# Patient Record
Sex: MALE | Race: WHITE | NOT HISPANIC OR LATINO | Employment: OTHER | URBAN - METROPOLITAN AREA
[De-identification: names, ages, dates, MRNs, and addresses within clinical notes are randomized per-mention and may not be internally consistent; named-entity substitution may affect disease eponyms.]

---

## 2017-02-24 ENCOUNTER — OFFICE VISIT (OUTPATIENT)
Dept: INTERNAL MEDICINE | Facility: CLINIC | Age: 67
End: 2017-02-24
Payer: MEDICARE

## 2017-02-24 VITALS
SYSTOLIC BLOOD PRESSURE: 172 MMHG | TEMPERATURE: 98 F | RESPIRATION RATE: 16 BRPM | HEART RATE: 73 BPM | DIASTOLIC BLOOD PRESSURE: 78 MMHG | WEIGHT: 165.81 LBS

## 2017-02-24 DIAGNOSIS — N41.1 CHRONIC PROSTATITIS: Primary | ICD-10-CM

## 2017-02-24 DIAGNOSIS — R30.0 SPASTIC DYSURIA: ICD-10-CM

## 2017-02-24 PROCEDURE — 99204 OFFICE O/P NEW MOD 45 MIN: CPT | Mod: S$PBB,,, | Performed by: FAMILY MEDICINE

## 2017-02-24 PROCEDURE — 99203 OFFICE O/P NEW LOW 30 MIN: CPT | Mod: PBBFAC,PO | Performed by: FAMILY MEDICINE

## 2017-02-24 PROCEDURE — 99999 PR PBB SHADOW E&M-NEW PATIENT-LVL III: CPT | Mod: PBBFAC,,, | Performed by: FAMILY MEDICINE

## 2017-02-24 PROCEDURE — 87086 URINE CULTURE/COLONY COUNT: CPT

## 2017-02-24 PROCEDURE — 96372 THER/PROPH/DIAG INJ SC/IM: CPT | Mod: PBBFAC,PO

## 2017-02-24 PROCEDURE — 81001 URINALYSIS AUTO W/SCOPE: CPT | Mod: PBBFAC,PO | Performed by: FAMILY MEDICINE

## 2017-02-24 RX ORDER — TRIMETHOPRIM 100 MG/1
TABLET ORAL
Refills: 0 | COMMUNITY
Start: 2017-02-15 | End: 2017-02-24

## 2017-02-24 RX ORDER — CEFTRIAXONE 1 G/1
1 INJECTION, POWDER, FOR SOLUTION INTRAMUSCULAR; INTRAVENOUS
Status: COMPLETED | OUTPATIENT
Start: 2017-02-24 | End: 2017-02-24

## 2017-02-24 RX ORDER — VALSARTAN 80 MG/1
80 TABLET ORAL DAILY
COMMUNITY
End: 2019-09-05 | Stop reason: DRUGHIGH

## 2017-02-24 RX ORDER — TRAMADOL HYDROCHLORIDE 50 MG/1
50 TABLET ORAL EVERY 6 HOURS PRN
Qty: 30 TABLET | Refills: 0 | Status: SHIPPED | OUTPATIENT
Start: 2017-02-24 | End: 2017-03-06

## 2017-02-24 RX ORDER — CIPROFLOXACIN 500 MG/1
500 TABLET ORAL 2 TIMES DAILY
Qty: 20 TABLET | Refills: 0 | Status: SHIPPED | OUTPATIENT
Start: 2017-02-24 | End: 2017-03-06

## 2017-02-24 RX ADMIN — CEFTRIAXONE 1 G: 1 INJECTION, POWDER, FOR SOLUTION INTRAMUSCULAR; INTRAVENOUS at 04:02

## 2017-02-26 LAB — BACTERIA UR CULT: NO GROWTH

## 2017-02-27 LAB
BILIRUB SERPL-MCNC: NEGATIVE MG/DL
BLOOD URINE, POC: NEGATIVE
COLOR, POC UA: YELLOW
GLUCOSE UR QL STRIP: NEGATIVE
KETONES UR QL STRIP: ABNORMAL
LEUKOCYTE ESTERASE URINE, POC: NEGATIVE
NITRITE, POC UA: NEGATIVE
PH, POC UA: 5
PROTEIN, POC: NEGATIVE
SPECIFIC GRAVITY, POC UA: 1.02
UROBILINOGEN, POC UA: NORMAL

## 2019-08-22 ENCOUNTER — TELEPHONE (OUTPATIENT)
Dept: ADMINISTRATIVE | Facility: HOSPITAL | Age: 69
End: 2019-08-22

## 2019-08-22 ENCOUNTER — PATIENT OUTREACH (OUTPATIENT)
Dept: ADMINISTRATIVE | Facility: HOSPITAL | Age: 69
End: 2019-08-22

## 2019-09-05 ENCOUNTER — OFFICE VISIT (OUTPATIENT)
Dept: FAMILY MEDICINE | Facility: CLINIC | Age: 69
End: 2019-09-05
Payer: COMMERCIAL

## 2019-09-05 VITALS
BODY MASS INDEX: 24.61 KG/M2 | WEIGHT: 166.13 LBS | OXYGEN SATURATION: 98 % | SYSTOLIC BLOOD PRESSURE: 130 MMHG | HEART RATE: 62 BPM | HEIGHT: 69 IN | DIASTOLIC BLOOD PRESSURE: 64 MMHG | TEMPERATURE: 98 F | RESPIRATION RATE: 18 BRPM

## 2019-09-05 DIAGNOSIS — I10 BENIGN ESSENTIAL HYPERTENSION: Primary | ICD-10-CM

## 2019-09-05 DIAGNOSIS — F51.01 PRIMARY INSOMNIA: ICD-10-CM

## 2019-09-05 DIAGNOSIS — K21.9 GASTROESOPHAGEAL REFLUX DISEASE WITHOUT ESOPHAGITIS: ICD-10-CM

## 2019-09-05 PROCEDURE — 99213 OFFICE O/P EST LOW 20 MIN: CPT | Mod: S$GLB,,, | Performed by: INTERNAL MEDICINE

## 2019-09-05 PROCEDURE — 99999 PR PBB SHADOW E&M-EST. PATIENT-LVL III: ICD-10-PCS | Mod: PBBFAC,,, | Performed by: INTERNAL MEDICINE

## 2019-09-05 PROCEDURE — 1101F PT FALLS ASSESS-DOCD LE1/YR: CPT | Mod: CPTII,S$GLB,, | Performed by: INTERNAL MEDICINE

## 2019-09-05 PROCEDURE — 1101F PR PT FALLS ASSESS DOC 0-1 FALLS W/OUT INJ PAST YR: ICD-10-PCS | Mod: CPTII,S$GLB,, | Performed by: INTERNAL MEDICINE

## 2019-09-05 PROCEDURE — 99999 PR PBB SHADOW E&M-EST. PATIENT-LVL III: CPT | Mod: PBBFAC,,, | Performed by: INTERNAL MEDICINE

## 2019-09-05 PROCEDURE — 99213 PR OFFICE/OUTPT VISIT, EST, LEVL III, 20-29 MIN: ICD-10-PCS | Mod: S$GLB,,, | Performed by: INTERNAL MEDICINE

## 2019-09-05 RX ORDER — SILDENAFIL 100 MG/1
100 TABLET, FILM COATED ORAL DAILY PRN
COMMUNITY
End: 2020-02-27 | Stop reason: SDUPTHER

## 2019-09-05 RX ORDER — VALSARTAN 160 MG/1
160 TABLET ORAL DAILY
Qty: 90 TABLET | Refills: 3 | Status: SHIPPED | OUTPATIENT
Start: 2019-09-05 | End: 2020-10-15 | Stop reason: SDUPTHER

## 2019-09-05 RX ORDER — ZOLPIDEM TARTRATE 5 MG/1
5 TABLET ORAL NIGHTLY PRN
COMMUNITY
End: 2020-09-10 | Stop reason: SDUPTHER

## 2019-09-05 RX ORDER — VALSARTAN 160 MG/1
160 TABLET ORAL DAILY
COMMUNITY
End: 2019-09-05 | Stop reason: ALTCHOICE

## 2019-09-05 RX ORDER — VALSARTAN 160 MG/1
160 TABLET ORAL DAILY
COMMUNITY
End: 2019-09-05 | Stop reason: SDUPTHER

## 2019-09-05 RX ORDER — PANTOPRAZOLE SODIUM 40 MG/1
40 TABLET, DELAYED RELEASE ORAL DAILY
COMMUNITY
End: 2020-05-19 | Stop reason: SDUPTHER

## 2019-09-05 NOTE — PROGRESS NOTES
[facility] Note    Chief Complaint      Chief Complaint   Patient presents with    Follow-up     6 month follow up        History of Present Illness      Gerald Wilkinson is a 69 y.o. male who presents today for   Chief Complaint   Patient presents with    Follow-up     6 month follow up    .  Patient comes to appointment for 6 m checkup for chronicissues  as below . He is uptopdate with all labs all scanned in 1/19 all look good .   bp well controlled as well . He is active with golfing and walking for exercise .  HPI    No problem-specific Assessment & Plan notes found for this encounter.       Problem List Items Addressed This Visit        Cardiac/Vascular    Benign essential hypertension - Primary    Overview     bp well controled on current regimen cont same            GI    Gastroesophageal reflux disease without esophagitis    Overview     Cont current regimen prn protonix             Other    Primary insomnia    Overview     ambien prn continues to work well                 Past Medical History:  History reviewed. No pertinent past medical history.    Past Surgical History:  History reviewed. No pertinent surgical history.    Family History:  family history includes Cancer in his mother and sister; Hypertension in his mother and sister.     Social History:  Social History     Socioeconomic History    Marital status:      Spouse name: Not on file    Number of children: Not on file    Years of education: Not on file    Highest education level: Not on file   Occupational History    Not on file   Social Needs    Financial resource strain: Not on file    Food insecurity:     Worry: Not on file     Inability: Not on file    Transportation needs:     Medical: Not on file     Non-medical: Not on file   Tobacco Use    Smoking status: Never Smoker   Substance and Sexual Activity    Alcohol use: No    Drug use: Yes    Sexual activity: Yes     Partners: Female   Lifestyle     Physical activity:     Days per week: Not on file     Minutes per session: Not on file    Stress: Not on file   Relationships    Social connections:     Talks on phone: Not on file     Gets together: Not on file     Attends Mandaeism service: Not on file     Active member of club or organization: Not on file     Attends meetings of clubs or organizations: Not on file     Relationship status: Not on file   Other Topics Concern    Not on file   Social History Narrative    Not on file       Review of Systems:   Review of Systems   Constitutional: Negative for fever and weight loss.   HENT: Negative for congestion, hearing loss and sore throat.    Eyes: Negative for blurred vision.   Respiratory: Negative for cough and shortness of breath.    Cardiovascular: Negative for chest pain, claudication and leg swelling.   Gastrointestinal: Negative for abdominal pain, constipation and diarrhea.   Genitourinary: Negative for dysuria.   Musculoskeletal: Negative for back pain and myalgias.   Skin: Negative for rash.   Neurological: Negative for focal weakness and headaches.   Psychiatric/Behavioral: Negative for depression and memory loss. The patient is not nervous/anxious.         Medications:  Outpatient Encounter Medications as of 9/5/2019   Medication Sig Dispense Refill    DIOVAN 160 mg tablet Take 1 tablet (160 mg total) by mouth once daily. 90 tablet 3    multivitamin capsule Take 1 capsule by mouth once daily.      pantoprazole (PROTONIX) 40 MG tablet Take 40 mg by mouth once daily.      sildenafil (VIAGRA) 100 MG tablet Take 100 mg by mouth daily as needed for Erectile Dysfunction.      zolpidem (AMBIEN) 5 MG Tab Take 5 mg by mouth nightly as needed.      [DISCONTINUED] DIOVAN 160 mg tablet Take 160 mg by mouth once daily.      [DISCONTINUED] valsartan (DIOVAN) 160 MG tablet Take 160 mg by mouth once daily.      [DISCONTINUED] valsartan (DIOVAN) 80 MG tablet Take 80 mg by mouth once daily.       No  "facility-administered encounter medications on file as of 9/5/2019.        Allergies:  Review of patient's allergies indicates:  No Known Allergies      Physical Exam      Vitals:    09/05/19 1021   BP: 130/64   Pulse: 62   Resp: 18   Temp: 98 °F (36.7 °C)        Vital Signs  Temp: 98 °F (36.7 °C)  Pulse: 62  Resp: 18  SpO2: 98 %  BP: 130/64  BP Location: Right arm  Patient Position: Sitting  Pain Score: 0-No pain  Height and Weight  Height: 5' 9" (175.3 cm)  Weight: 75.4 kg (166 lb 1.9 oz)  BSA (Calculated - sq m): 1.92 sq meters  BMI (Calculated): 24.6  Weight in (lb) to have BMI = 25: 168.9]     Body mass index is 24.53 kg/m².    Physical Exam   Constitutional: He is oriented to person, place, and time. He appears well-developed and well-nourished.   HENT:   Head: Normocephalic.   Eyes: Pupils are equal, round, and reactive to light.   Neck: Normal range of motion. No thyromegaly present.   Cardiovascular: Normal rate and regular rhythm. Exam reveals no gallop and no friction rub.   No murmur heard.  Pulmonary/Chest: Effort normal and breath sounds normal.   Abdominal: Soft. Bowel sounds are normal.   Musculoskeletal: Normal range of motion. He exhibits no edema.   Neurological: He is alert and oriented to person, place, and time. No sensory deficit.   Skin: Skin is warm and dry.   Psychiatric: He has a normal mood and affect. His behavior is normal.        Laboratory:  CBC:  No results for input(s): WBC, RBC, HGB, HCT, PLT, MCV, MCH, MCHC in the last 2160 hours.  CMP:  No results for input(s): GLU, CALCIUM, ALBUMIN, PROT, NA, K, CO2, CL, BUN, ALKPHOS, ALT, AST, BILITOT in the last 2160 hours.    Invalid input(s): CREATININ  URINALYSIS:  No results for input(s): COLORU, CLARITYU, SPECGRAV, PHUR, PROTEINUA, GLUCOSEU, BILIRUBINCON, BLOODU, WBCU, RBCU, BACTERIA, MUCUS, NITRITE, LEUKOCYTESUR, UROBILINOGEN, HYALINECASTS in the last 2160 hours.   LIPIDS:  No results for input(s): TSH, HDL, CHOL, TRIG, LDLCALC, CHOLHDL, " NONHDLCHOL, TOTALCHOLEST in the last 2160 hours.  TSH:  No results for input(s): TSH in the last 2160 hours.  A1C:  No results for input(s): HGBA1C in the last 2160 hours.    Radiology:        Assessment:     Gerald Wilkinson is a 69 y.o.male with:    Benign essential hypertension    Gastroesophageal reflux disease without esophagitis    Primary insomnia    Other orders  -     DIOVAN 160 mg tablet; Take 1 tablet (160 mg total) by mouth once daily.  Dispense: 90 tablet; Refill: 3                Plan:     As above, continue current medications and maintain follow up with specialists.  Return to clinic in 8 months.    Frederick W Dantagnan [facility] [place]

## 2020-01-14 ENCOUNTER — TELEPHONE (OUTPATIENT)
Dept: FAMILY MEDICINE | Facility: CLINIC | Age: 70
End: 2020-01-14

## 2020-01-14 DIAGNOSIS — Z12.5 PROSTATE CANCER SCREENING: ICD-10-CM

## 2020-01-14 DIAGNOSIS — Z00.00 ANNUAL PHYSICAL EXAM: Primary | ICD-10-CM

## 2020-01-14 NOTE — TELEPHONE ENCOUNTER
----- Message from Hortencia Thakkar sent at 1/14/2020 10:10 AM [facility] -----  Contact: [phone]/self  Patient is requesting orders for lab work including his PSA and cholesterol sent to [facility]. Thanks

## 2020-01-15 NOTE — TELEPHONE ENCOUNTER
Patient he is not sure yet where he want to get his labs drawn at and plus labs not due till April patients states he will give us a call a week before his April appt to let us know where to send labs

## 2020-02-27 RX ORDER — SILDENAFIL 100 MG/1
100 TABLET, FILM COATED ORAL DAILY PRN
Qty: 10 TABLET | Refills: 0 | Status: SHIPPED | OUTPATIENT
Start: 2020-02-27 | End: 2020-09-29

## 2020-02-27 NOTE — TELEPHONE ENCOUNTER
----- Message from Emperatriz Steiner sent at 2/27/2020 11:45 AM CST -----  Contact: self [phone]  Patient called in requesting to speak with you. Patient prefers to speak with a nurse. Please advise.

## 2020-04-01 ENCOUNTER — TELEPHONE (OUTPATIENT)
Dept: FAMILY MEDICINE | Facility: CLINIC | Age: 70
End: 2020-04-01

## 2020-04-01 DIAGNOSIS — E78.5 BORDERLINE HYPERLIPIDEMIA: ICD-10-CM

## 2020-04-01 DIAGNOSIS — Z12.5 PROSTATE CANCER SCREENING: Primary | ICD-10-CM

## 2020-04-01 NOTE — TELEPHONE ENCOUNTER
----- Message from Adonay Jackson sent at 4/1/2020  9:37 AM CDT -----  Contact: PT  Pt called and would like to have orders put in his chart so he can have blood work done.    He would like to have his prostate checked and cholesterol checked (blood work)    Pt can be reached at [phone]

## 2020-04-01 NOTE — TELEPHONE ENCOUNTER
----- Message from Sharon Tabor sent at 4/1/2020  9:52 AM CDT -----  Contact: [phone]/ self   Patient requesting to speak with you regarding verifying that he can have his blood work done at [facility] in [place]. Please advise.

## 2020-04-13 ENCOUNTER — LAB VISIT (OUTPATIENT)
Dept: LAB | Facility: HOSPITAL | Age: 70
End: 2020-04-13
Attending: INTERNAL MEDICINE
Payer: COMMERCIAL

## 2020-04-13 DIAGNOSIS — E78.5 BORDERLINE HYPERLIPIDEMIA: ICD-10-CM

## 2020-04-13 DIAGNOSIS — Z12.5 PROSTATE CANCER SCREENING: ICD-10-CM

## 2020-04-13 DIAGNOSIS — Z00.00 ANNUAL PHYSICAL EXAM: ICD-10-CM

## 2020-04-13 LAB
ALBUMIN SERPL BCP-MCNC: 4.2 G/DL (ref 3.5–5.2)
ALP SERPL-CCNC: 44 U/L (ref 55–135)
ALT SERPL W/O P-5'-P-CCNC: 20 U/L (ref 10–44)
ANION GAP SERPL CALC-SCNC: 9 MMOL/L (ref 8–16)
AST SERPL-CCNC: 22 U/L (ref 10–40)
BASOPHILS # BLD AUTO: 0.03 K/UL (ref 0–0.2)
BASOPHILS NFR BLD: 0.6 % (ref 0–1.9)
BILIRUB SERPL-MCNC: 0.9 MG/DL (ref 0.1–1)
BUN SERPL-MCNC: 15 MG/DL (ref 8–23)
CALCIUM SERPL-MCNC: 9.6 MG/DL (ref 8.7–10.5)
CHLORIDE SERPL-SCNC: 105 MMOL/L (ref 95–110)
CHOLEST SERPL-MCNC: 224 MG/DL (ref 120–199)
CHOLEST/HDLC SERPL: 4.8 {RATIO} (ref 2–5)
CO2 SERPL-SCNC: 25 MMOL/L (ref 23–29)
COMPLEXED PSA SERPL-MCNC: 2.5 NG/ML (ref 0–4)
CREAT SERPL-MCNC: 1.1 MG/DL (ref 0.5–1.4)
DIFFERENTIAL METHOD: NORMAL
EOSINOPHIL # BLD AUTO: 0.2 K/UL (ref 0–0.5)
EOSINOPHIL NFR BLD: 3.5 % (ref 0–8)
ERYTHROCYTE [DISTWIDTH] IN BLOOD BY AUTOMATED COUNT: 12.3 % (ref 11.5–14.5)
EST. GFR  (AFRICAN AMERICAN): >60 ML/MIN/1.73 M^2
EST. GFR  (NON AFRICAN AMERICAN): >60 ML/MIN/1.73 M^2
GLUCOSE SERPL-MCNC: 95 MG/DL (ref 70–110)
HCT VFR BLD AUTO: 50.3 % (ref 40–54)
HDLC SERPL-MCNC: 47 MG/DL (ref 40–75)
HDLC SERPL: 21 % (ref 20–50)
HGB BLD-MCNC: 16.2 G/DL (ref 14–18)
IMM GRANULOCYTES # BLD AUTO: 0.01 K/UL (ref 0–0.04)
IMM GRANULOCYTES NFR BLD AUTO: 0.2 % (ref 0–0.5)
LDLC SERPL CALC-MCNC: 149.2 MG/DL (ref 63–159)
LYMPHOCYTES # BLD AUTO: 1.3 K/UL (ref 1–4.8)
LYMPHOCYTES NFR BLD: 24.5 % (ref 18–48)
MCH RBC QN AUTO: 28.6 PG (ref 27–31)
MCHC RBC AUTO-ENTMCNC: 32.2 G/DL (ref 32–36)
MCV RBC AUTO: 89 FL (ref 82–98)
MONOCYTES # BLD AUTO: 0.6 K/UL (ref 0.3–1)
MONOCYTES NFR BLD: 10.4 % (ref 4–15)
NEUTROPHILS # BLD AUTO: 3.3 K/UL (ref 1.8–7.7)
NEUTROPHILS NFR BLD: 60.8 % (ref 38–73)
NONHDLC SERPL-MCNC: 177 MG/DL
NRBC BLD-RTO: 0 /100 WBC
PLATELET # BLD AUTO: 210 K/UL (ref 150–350)
PMV BLD AUTO: 10.7 FL (ref 9.2–12.9)
POTASSIUM SERPL-SCNC: 4.1 MMOL/L (ref 3.5–5.1)
PROT SERPL-MCNC: 7.6 G/DL (ref 6–8.4)
RBC # BLD AUTO: 5.67 M/UL (ref 4.6–6.2)
SODIUM SERPL-SCNC: 139 MMOL/L (ref 136–145)
TRIGL SERPL-MCNC: 139 MG/DL (ref 30–150)
WBC # BLD AUTO: 5.38 K/UL (ref 3.9–12.7)

## 2020-04-13 PROCEDURE — 80053 COMPREHEN METABOLIC PANEL: CPT

## 2020-04-13 PROCEDURE — 85025 COMPLETE CBC W/AUTO DIFF WBC: CPT

## 2020-04-13 PROCEDURE — 80061 LIPID PANEL: CPT

## 2020-04-13 PROCEDURE — 84153 ASSAY OF PSA TOTAL: CPT

## 2020-04-13 PROCEDURE — 36415 COLL VENOUS BLD VENIPUNCTURE: CPT

## 2020-04-16 ENCOUNTER — TELEPHONE (OUTPATIENT)
Dept: FAMILY MEDICINE | Facility: CLINIC | Age: 70
End: 2020-04-16

## 2020-04-16 NOTE — TELEPHONE ENCOUNTER
----- Message from Pamela Lane sent at 4/16/2020  2:06 PM CDT -----  Contact: pt  Pt missed a call and would the nurse to return their call.    Pt can be reached at [phone]

## 2020-05-19 DIAGNOSIS — K21.9 GASTROESOPHAGEAL REFLUX DISEASE WITHOUT ESOPHAGITIS: Primary | ICD-10-CM

## 2020-05-19 RX ORDER — PANTOPRAZOLE SODIUM 40 MG/1
40 TABLET, DELAYED RELEASE ORAL DAILY
Qty: 30 TABLET | Refills: 5 | Status: SHIPPED | OUTPATIENT
Start: 2020-05-19 | End: 2021-09-30 | Stop reason: ALTCHOICE

## 2020-05-19 NOTE — TELEPHONE ENCOUNTER
----- Message from Riri Seay sent at 5/19/2020  2:02 PM CDT -----  Contact: Pt   Pt is requesting a call back regarding a prescription     Pt can be reached at [phone]

## 2020-05-19 NOTE — TELEPHONE ENCOUNTER
----- Message from Hortencia Thakkar sent at 5/19/2020  2:11 PM CDT -----  Contact: [facility] pharmacy/[phone]  Type:  RX Refill Request    Who Called:  pharmacy  Refill or New Rx: refill  RX Name and Strength: pantoprazole (PROTONIX) 40 MG tablet  How is the patient currently taking it? (ex. 1XDay):   Is this a 30 day or 90 day RX:   Preferred Pharmacy with phone number: [id number] [address] AT [facility] [address]  Local or Mail Order:   Ordering Provider: dr. Tellez  Would the patient rather a call back or a response via [facility]?  call  Best Call Back Number: [phone]/Lizandro  Additional Information:

## 2020-05-26 NOTE — TELEPHONE ENCOUNTER
Changed appt to [facility].... See other message   I spoke with the pt   He is requesting  covid testing  He has no symptoms   He refused to go to [place] for testing     He will call urgent care on [facility] to set up testing there

## 2020-08-18 ENCOUNTER — OFFICE VISIT (OUTPATIENT)
Dept: FAMILY MEDICINE | Facility: CLINIC | Age: 70
End: 2020-08-18
Payer: COMMERCIAL

## 2020-08-18 VITALS
RESPIRATION RATE: 18 BRPM | HEART RATE: 77 BPM | SYSTOLIC BLOOD PRESSURE: 134 MMHG | DIASTOLIC BLOOD PRESSURE: 78 MMHG | TEMPERATURE: 98 F | OXYGEN SATURATION: 98 % | HEIGHT: 69 IN | BODY MASS INDEX: 24.75 KG/M2 | WEIGHT: 167.13 LBS

## 2020-08-18 DIAGNOSIS — F51.01 PRIMARY INSOMNIA: ICD-10-CM

## 2020-08-18 DIAGNOSIS — K21.9 GASTROESOPHAGEAL REFLUX DISEASE WITHOUT ESOPHAGITIS: ICD-10-CM

## 2020-08-18 DIAGNOSIS — Z00.00 ANNUAL PHYSICAL EXAM: Primary | ICD-10-CM

## 2020-08-18 DIAGNOSIS — N52.9 ERECTILE DYSFUNCTION, UNSPECIFIED ERECTILE DYSFUNCTION TYPE: ICD-10-CM

## 2020-08-18 DIAGNOSIS — I10 BENIGN ESSENTIAL HYPERTENSION: ICD-10-CM

## 2020-08-18 PROCEDURE — 99999 PR PBB SHADOW E&M-EST. PATIENT-LVL IV: ICD-10-PCS | Mod: PBBFAC,,, | Performed by: INTERNAL MEDICINE

## 2020-08-18 PROCEDURE — 3075F PR MOST RECENT SYSTOLIC BLOOD PRESS GE 130-139MM HG: ICD-10-PCS | Mod: CPTII,S$GLB,, | Performed by: INTERNAL MEDICINE

## 2020-08-18 PROCEDURE — 99397 PER PM REEVAL EST PAT 65+ YR: CPT | Mod: S$GLB,,, | Performed by: INTERNAL MEDICINE

## 2020-08-18 PROCEDURE — 3078F DIAST BP <80 MM HG: CPT | Mod: CPTII,S$GLB,, | Performed by: INTERNAL MEDICINE

## 2020-08-18 PROCEDURE — 3078F PR MOST RECENT DIASTOLIC BLOOD PRESSURE < 80 MM HG: ICD-10-PCS | Mod: CPTII,S$GLB,, | Performed by: INTERNAL MEDICINE

## 2020-08-18 PROCEDURE — 99397 PR PREVENTIVE VISIT,EST,65 & OVER: ICD-10-PCS | Mod: S$GLB,,, | Performed by: INTERNAL MEDICINE

## 2020-08-18 PROCEDURE — 99999 PR PBB SHADOW E&M-EST. PATIENT-LVL IV: CPT | Mod: PBBFAC,,, | Performed by: INTERNAL MEDICINE

## 2020-08-18 PROCEDURE — 3075F SYST BP GE 130 - 139MM HG: CPT | Mod: CPTII,S$GLB,, | Performed by: INTERNAL MEDICINE

## 2020-08-18 RX ORDER — TADALAFIL 20 MG/1
20 TABLET ORAL DAILY
Qty: 6 TABLET | Refills: 1 | Status: SHIPPED | OUTPATIENT
Start: 2020-08-18 | End: 2023-03-30 | Stop reason: SDUPTHER

## 2020-08-18 NOTE — PROGRESS NOTES
[facility] Note    Chief Complaint      Chief Complaint   Patient presents with    Annual Exam       History of Present Illness      Gerald Wilkinson is a 70 y.o. male who presents today for   Chief Complaint   Patient presents with    Annual Exam   .  Patient comes to appointment here for annual preventative visit with me . 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 Had full labs done in April all reviewed by me today . He is active with walking and golfing . just had visit with dr chano mata for skin survey . He declines vaccines at this time . He just had optho eval this year with  dr mcconnell .     HPI    No problem-specific Assessment & Plan notes found for this encounter.       Problem List Items Addressed This Visit        Cardiac/Vascular    Benign essential hypertension    Overview     bp well controled on current regimen cont same            Renal/    Erectile dysfunction    Overview     Try cialis samples . As his viagra is not working             GI    Gastroesophageal reflux disease without esophagitis    Overview     Cont current regimen prn protonix             Other    Primary insomnia    Overview     ambien prn continues to work well          Annual physical exam - Primary    Overview      pe documented all screening labs reviewed cont current meds                 Past Medical History:  History reviewed. No pertinent past medical history.    Past Surgical History:  History reviewed. No pertinent surgical history.    Family History:  family history includes Cancer in his mother and sister; Hypertension in his mother and sister.     Social History:  Social History     Socioeconomic History    Marital status:      Spouse name: Not on file    Number of children: Not on file    Years of education: Not on file    Highest education level: Not on file   Occupational History    Not on file   Social Needs    Financial resource strain:  Not on file    Food insecurity     Worry: Not on file     Inability: Not on file    Transportation needs     Medical: Not on file     Non-medical: Not on file   Tobacco Use    Smoking status: Never Smoker   Substance and Sexual Activity    Alcohol use: No    Drug use: Yes    Sexual activity: Yes     Partners: Female   Lifestyle    Physical activity     Days per week: 7 days     Minutes per session: 60 min    Stress: Not at all   Relationships    Social connections     Talks on phone: More than three times a week     Gets together: More than three times a week     Attends Denominational service: Not on file     Active member of club or organization: Yes     Attends meetings of clubs or organizations: More than 4 times per year     Relationship status:    Other Topics Concern    Not on file   Social History Narrative    Not on file       Review of Systems:   Review of Systems   HENT: Negative for hearing loss.    Eyes: Negative for discharge.   Respiratory: Negative for wheezing.    Cardiovascular: Negative for chest pain and palpitations.   Gastrointestinal: Negative for blood in stool, constipation, diarrhea and vomiting.   Genitourinary: Negative for hematuria and urgency.   Musculoskeletal: Negative for neck pain.   Neurological: Negative for weakness and headaches.   Endo/Heme/Allergies: Negative for polydipsia.   Psychiatric/Behavioral: Negative for depression. The patient is not nervous/anxious and does not have insomnia.         Medications:  Outpatient Encounter Medications as of 8/18/2020   Medication Sig Dispense Refill    DIOVAN 160 mg tablet Take 1 tablet (160 mg total) by mouth once daily. 90 tablet 3    multivitamin capsule Take 1 capsule by mouth once daily.      pantoprazole (PROTONIX) 40 MG tablet Take 1 tablet (40 mg total) by mouth once daily. 30 tablet 5    sildenafil (VIAGRA) 100 MG tablet Take 1 tablet (100 mg total) by mouth daily as needed for Erectile Dysfunction. 10 tablet 0  "   zolpidem (AMBIEN) 5 MG Tab Take 5 mg by mouth nightly as needed.       No facility-administered encounter medications on file as of 8/18/2020.        Allergies:  Review of patient's allergies indicates:  No Known Allergies      Physical Exam      Vitals:    08/18/20 1509   BP: 134/78   Pulse:    Resp:    Temp:         Vital Signs  Temp: 98 °F (36.7 °C)  Temp src: Oral  Pulse: 77  Resp: 18  SpO2: 98 %  BP: 134/78  BP Location: Right arm  Patient Position: Sitting  Pain Score: 0-No pain  Height and Weight  Height: 5' 9" (175.3 cm)  Weight: 75.8 kg (167 lb 1.7 oz)  BSA (Calculated - sq m): 1.92 sq meters  BMI (Calculated): 24.7  Weight in (lb) to have BMI = 25: 168.9]     Body mass index is 24.68 kg/m².    Physical Exam  Constitutional:       Appearance: He is well-developed.   HENT:      Head: Normocephalic.   Eyes:      Pupils: Pupils are equal, round, and reactive to light.   Neck:      Musculoskeletal: Normal range of motion.      Thyroid: No thyromegaly.   Cardiovascular:      Rate and Rhythm: Normal rate and regular rhythm.      Heart sounds: No murmur. No friction rub. No gallop.    Pulmonary:      Effort: Pulmonary effort is normal.      Breath sounds: Normal breath sounds.   Abdominal:      General: Bowel sounds are normal.      Palpations: Abdomen is soft.   Musculoskeletal: Normal range of motion.   Skin:     General: Skin is warm and dry.   Neurological:      Mental Status: He is alert and oriented to person, place, and time.      Sensory: No sensory deficit.   Psychiatric:         Behavior: Behavior normal.          Laboratory:  CBC:  No results for input(s): WBC, RBC, HGB, HCT, PLT, MCV, MCH, MCHC in the last 2160 hours.  CMP:  No results for input(s): GLU, CALCIUM, ALBUMIN, PROT, NA, K, CO2, CL, BUN, ALKPHOS, ALT, AST, BILITOT in the last 2160 hours.    Invalid input(s): CREATININ  URINALYSIS:  No results for input(s): COLORU, CLARITYU, SPECGRAV, PHUR, PROTEINUA, GLUCOSEU, BILIRUBINCON, BLOODU, WBCU, " RBCU, BACTERIA, MUCUS, NITRITE, LEUKOCYTESUR, UROBILINOGEN, HYALINECASTS in the last 2160 hours.   LIPIDS:  No results for input(s): TSH, HDL, CHOL, TRIG, LDLCALC, CHOLHDL, NONHDLCHOL, TOTALCHOLEST in the last 2160 hours.  TSH:  No results for input(s): TSH in the last 2160 hours.  A1C:  No results for input(s): HGBA1C in the last 2160 hours.    Radiology:        Assessment:     Gerald Wilkinson is a 70 y.o.male with:    Annual physical exam    Erectile dysfunction, unspecified erectile dysfunction type    Gastroesophageal reflux disease without esophagitis    Primary insomnia    Benign essential hypertension                Plan:     Problem List Items Addressed This Visit        Cardiac/Vascular    Benign essential hypertension    Overview     bp well controled on current regimen cont same            Renal/    Erectile dysfunction    Overview     Try cialis samples . As his viagra is not working             GI    Gastroesophageal reflux disease without esophagitis    Overview     Cont current regimen prn protonix             Other    Primary insomnia    Overview     ambien prn continues to work well          Annual physical exam - Primary    Overview      pe documented all screening labs reviewed cont current meds                As above, continue current medications and maintain follow up with specialists.  Return to clinic in 6  months.      Frederick W Dantagnan [facility] [place]                  Answers for HPI/ROS submitted by the patient on 8/17/2020   activity change: No  unexpected weight change: No  rhinorrhea: No  trouble swallowing: No  visual disturbance: No  chest tightness: No  polyuria: No  difficulty urinating: No  joint swelling: No  arthralgias: No  confusion: No  dysphoric mood: No

## 2020-09-02 ENCOUNTER — TELEPHONE (OUTPATIENT)
Dept: FAMILY MEDICINE | Facility: CLINIC | Age: 70
End: 2020-09-02

## 2020-09-02 NOTE — TELEPHONE ENCOUNTER
----- Message from Braxton Cook sent at 9/2/2020  1:38 PM CDT -----  Type:  Needs Medical Advice    Who Called: self  Reason:Questions about his medication  Would the patient rather a call back or a response via [facility]? call  Best Call Back Number: [phone]  Additional Information: none

## 2020-09-02 NOTE — TELEPHONE ENCOUNTER
Patient states he finished the 14 days of Align and it helped with his gas pain. Now every time he eats he is having diarrhea and he finished the 14 days two days ago.

## 2020-09-10 ENCOUNTER — OFFICE VISIT (OUTPATIENT)
Dept: FAMILY MEDICINE | Facility: CLINIC | Age: 70
End: 2020-09-10
Payer: COMMERCIAL

## 2020-09-10 VITALS
SYSTOLIC BLOOD PRESSURE: 120 MMHG | DIASTOLIC BLOOD PRESSURE: 80 MMHG | BODY MASS INDEX: 24.26 KG/M2 | RESPIRATION RATE: 18 BRPM | HEIGHT: 69 IN | TEMPERATURE: 98 F | WEIGHT: 163.81 LBS | OXYGEN SATURATION: 99 % | HEART RATE: 76 BPM

## 2020-09-10 DIAGNOSIS — R19.5 LOOSE STOOLS: Primary | ICD-10-CM

## 2020-09-10 DIAGNOSIS — F51.01 PRIMARY INSOMNIA: ICD-10-CM

## 2020-09-10 PROCEDURE — 3008F PR BODY MASS INDEX (BMI) DOCUMENTED: ICD-10-PCS | Mod: CPTII,S$GLB,, | Performed by: INTERNAL MEDICINE

## 2020-09-10 PROCEDURE — 99214 OFFICE O/P EST MOD 30 MIN: CPT | Mod: S$GLB,,, | Performed by: INTERNAL MEDICINE

## 2020-09-10 PROCEDURE — 3079F PR MOST RECENT DIASTOLIC BLOOD PRESSURE 80-89 MM HG: ICD-10-PCS | Mod: CPTII,S$GLB,, | Performed by: INTERNAL MEDICINE

## 2020-09-10 PROCEDURE — 1159F PR MEDICATION LIST DOCUMENTED IN MEDICAL RECORD: ICD-10-PCS | Mod: S$GLB,,, | Performed by: INTERNAL MEDICINE

## 2020-09-10 PROCEDURE — 3074F PR MOST RECENT SYSTOLIC BLOOD PRESSURE < 130 MM HG: ICD-10-PCS | Mod: CPTII,S$GLB,, | Performed by: INTERNAL MEDICINE

## 2020-09-10 PROCEDURE — 3008F BODY MASS INDEX DOCD: CPT | Mod: CPTII,S$GLB,, | Performed by: INTERNAL MEDICINE

## 2020-09-10 PROCEDURE — 1101F PT FALLS ASSESS-DOCD LE1/YR: CPT | Mod: CPTII,S$GLB,, | Performed by: INTERNAL MEDICINE

## 2020-09-10 PROCEDURE — 99999 PR PBB SHADOW E&M-EST. PATIENT-LVL III: CPT | Mod: PBBFAC,,, | Performed by: INTERNAL MEDICINE

## 2020-09-10 PROCEDURE — 1126F AMNT PAIN NOTED NONE PRSNT: CPT | Mod: S$GLB,,, | Performed by: INTERNAL MEDICINE

## 2020-09-10 PROCEDURE — 1101F PR PT FALLS ASSESS DOC 0-1 FALLS W/OUT INJ PAST YR: ICD-10-PCS | Mod: CPTII,S$GLB,, | Performed by: INTERNAL MEDICINE

## 2020-09-10 PROCEDURE — 3079F DIAST BP 80-89 MM HG: CPT | Mod: CPTII,S$GLB,, | Performed by: INTERNAL MEDICINE

## 2020-09-10 PROCEDURE — 99214 PR OFFICE/OUTPT VISIT, EST, LEVL IV, 30-39 MIN: ICD-10-PCS | Mod: S$GLB,,, | Performed by: INTERNAL MEDICINE

## 2020-09-10 PROCEDURE — 1126F PR PAIN SEVERITY QUANTIFIED, NO PAIN PRESENT: ICD-10-PCS | Mod: S$GLB,,, | Performed by: INTERNAL MEDICINE

## 2020-09-10 PROCEDURE — 1159F MED LIST DOCD IN RCRD: CPT | Mod: S$GLB,,, | Performed by: INTERNAL MEDICINE

## 2020-09-10 PROCEDURE — 3074F SYST BP LT 130 MM HG: CPT | Mod: CPTII,S$GLB,, | Performed by: INTERNAL MEDICINE

## 2020-09-10 PROCEDURE — 99999 PR PBB SHADOW E&M-EST. PATIENT-LVL III: ICD-10-PCS | Mod: PBBFAC,,, | Performed by: INTERNAL MEDICINE

## 2020-09-10 RX ORDER — ZOLPIDEM TARTRATE 5 MG/1
5 TABLET ORAL NIGHTLY PRN
Qty: 30 TABLET | Refills: 0 | Status: SHIPPED | OUTPATIENT
Start: 2020-09-10 | End: 2024-03-11

## 2020-09-10 NOTE — PROGRESS NOTES
[facility] Note    Chief Complaint      Chief Complaint   Patient presents with    Diarrhea     x 2 weeks states align does not work and imodium helps only for 2 days        History of Present Illness      Gerald Wilkinson is a 70 y.o. male who presents today for   Chief Complaint   Patient presents with    Diarrhea     x 2 weeks states align does not work and imodium helps only for 2 days    .  Patient comes to appointment here for acute visit secondary to above. He was having bloating and loose stools was started on align gas and abd bloating better but is still having some issues with loose stools .     HPI    No problem-specific Assessment & Plan notes found for this encounter.       Problem List Items Addressed This Visit        GI    Loose stools - Primary    Overview     Restart metamucil   Stop align   He will call back with update                 Past Medical History:  History reviewed. No pertinent past medical history.    Past Surgical History:  History reviewed. No pertinent surgical history.    Family History:  family history includes Cancer in his mother and sister; Hypertension in his mother and sister.     Social History:  Social History     Socioeconomic History    Marital status:      Spouse name: Not on file    Number of children: Not on file    Years of education: Not on file    Highest education level: Not on file   Occupational History    Not on file   Social Needs    Financial resource strain: Not on file    Food insecurity     Worry: Not on file     Inability: Not on file    Transportation needs     Medical: Not on file     Non-medical: Not on file   Tobacco Use    Smoking status: Never Smoker   Substance and Sexual Activity    Alcohol use: No    Drug use: Yes    Sexual activity: Yes     Partners: Female   Lifestyle    Physical activity     Days per week: 7 days     Minutes per session: 60 min    Stress: Not at all   Relationships    Social  connections     Talks on phone: More than three times a week     Gets together: More than three times a week     Attends Latter day service: Not on file     Active member of club or organization: Yes     Attends meetings of clubs or organizations: More than 4 times per year     Relationship status:    Other Topics Concern    Not on file   Social History Narrative    Not on file       Review of Systems:   Review of Systems   Constitutional: Negative for fever and weight loss.   HENT: Negative for congestion, hearing loss and sore throat.    Eyes: Negative for blurred vision.   Respiratory: Negative for cough and shortness of breath.    Cardiovascular: Negative for chest pain, palpitations, claudication and leg swelling.   Gastrointestinal: Positive for diarrhea. Negative for abdominal pain, constipation, heartburn, nausea and vomiting.   Genitourinary: Negative for dysuria.   Musculoskeletal: Negative for back pain and myalgias.   Skin: Negative for rash.   Neurological: Negative for focal weakness and headaches.   Psychiatric/Behavioral: Negative for depression and suicidal ideas.        Medications:  Outpatient Encounter Medications as of 9/10/2020   Medication Sig Dispense Refill    DIOVAN 160 mg tablet Take 1 tablet (160 mg total) by mouth once daily. 90 tablet 3    multivitamin capsule Take 1 capsule by mouth once daily.      pantoprazole (PROTONIX) 40 MG tablet Take 1 tablet (40 mg total) by mouth once daily. 30 tablet 5    sildenafil (VIAGRA) 100 MG tablet Take 1 tablet (100 mg total) by mouth daily as needed for Erectile Dysfunction. 10 tablet 0    tadalafiL (CIALIS) 20 MG Tab Take 1 tablet (20 mg total) by mouth once daily. 6 tablet 1    zolpidem (AMBIEN) 5 MG Tab Take 5 mg by mouth nightly as needed.       No facility-administered encounter medications on file as of 9/10/2020.        Allergies:  Review of patient's allergies indicates:  No Known Allergies      Physical Exam      Vitals:     "09/10/20 1034   BP: 120/80   Pulse: 76   Resp: 18   Temp: 97.8 °F (36.6 °C)        Vital Signs  Temp: 97.8 °F (36.6 °C)  Temp src: Oral  Pulse: 76  Resp: 18  SpO2: 99 %  BP: 120/80  BP Location: Right arm  Patient Position: Sitting  Pain Score: 0-No pain  Height and Weight  Height: 5' 9" (175.3 cm)  Weight: 74.3 kg (163 lb 12.8 oz)  BSA (Calculated - sq m): 1.9 sq meters  BMI (Calculated): 24.2  Weight in (lb) to have BMI = 25: 168.9]     Body mass index is 24.19 kg/m².    Physical Exam  Constitutional:       Appearance: He is well-developed.   HENT:      Head: Normocephalic.   Eyes:      Pupils: Pupils are equal, round, and reactive to light.   Neck:      Musculoskeletal: Normal range of motion.      Thyroid: No thyromegaly.   Cardiovascular:      Rate and Rhythm: Normal rate and regular rhythm.      Heart sounds: No murmur. No friction rub. No gallop.    Pulmonary:      Effort: Pulmonary effort is normal.      Breath sounds: Normal breath sounds.   Abdominal:      General: Bowel sounds are normal.      Palpations: Abdomen is soft. There is no hepatomegaly or splenomegaly.      Tenderness: There is no abdominal tenderness.   Musculoskeletal: Normal range of motion.   Skin:     General: Skin is warm and dry.   Neurological:      Mental Status: He is alert and oriented to person, place, and time.      Sensory: No sensory deficit.   Psychiatric:         Behavior: Behavior normal.          Laboratory:  CBC:  No results for input(s): WBC, RBC, HGB, HCT, PLT, MCV, MCH, MCHC in the last 2160 hours.  CMP:  No results for input(s): GLU, CALCIUM, ALBUMIN, PROT, NA, K, CO2, CL, BUN, ALKPHOS, ALT, AST, BILITOT in the last 2160 hours.    Invalid input(s): CREATININ  URINALYSIS:  No results for input(s): COLORU, CLARITYU, SPECGRAV, PHUR, PROTEINUA, GLUCOSEU, BILIRUBINCON, BLOODU, WBCU, RBCU, BACTERIA, MUCUS, NITRITE, LEUKOCYTESUR, UROBILINOGEN, HYALINECASTS in the last 2160 hours.   LIPIDS:  No results for input(s): TSH, HDL, " CHOL, TRIG, LDLCALC, CHOLHDL, NONHDLCHOL, TOTALCHOLEST in the last 2160 hours.  TSH:  No results for input(s): TSH in the last 2160 hours.  A1C:  No results for input(s): HGBA1C in the last 2160 hours.    Radiology:        Assessment:     Gerald Wilkinson is a 70 y.o.male with:    Loose stools    Other orders  -     zolpidem (AMBIEN) 5 MG Tab; Take 1 tablet (5 mg total) by mouth nightly as needed.  Dispense: 30 tablet; Refill: 0                Plan:     Problem List Items Addressed This Visit        GI    Loose stools - Primary    Overview     Restart metamucil   Stop align   He will call back with update                As above, continue current medications and maintain follow up with specialists.  Return to clinic in 6 months.  .sig Frederick W Dantagnan [facility] [place]

## 2020-09-17 ENCOUNTER — PATIENT MESSAGE (OUTPATIENT)
Dept: FAMILY MEDICINE | Facility: CLINIC | Age: 70
End: 2020-09-17

## 2020-09-18 ENCOUNTER — PATIENT MESSAGE (OUTPATIENT)
Dept: FAMILY MEDICINE | Facility: CLINIC | Age: 70
End: 2020-09-18

## 2020-09-18 DIAGNOSIS — R19.5 LOOSE STOOLS: Primary | ICD-10-CM

## 2020-09-18 NOTE — TELEPHONE ENCOUNTER
Advise patient that I recommend evaluation with gi . I think he ahs gi md that he can contact and schedule appt with . If not we can refer

## 2020-09-22 ENCOUNTER — TELEPHONE (OUTPATIENT)
Dept: GASTROENTEROLOGY | Facility: CLINIC | Age: 70
End: 2020-09-22

## 2020-09-22 NOTE — TELEPHONE ENCOUNTER
Message left for patient to return my call regarding an appointment with Dr.James Darby.  Can offer him an appointment with  on 12/15 for 2:00.  He is also on 's cancellation list.   Honey

## 2020-09-22 NOTE — TELEPHONE ENCOUNTER
----- Message from Hannah Drake MA sent at 9/19/2020  3:35 PM CDT -----  Honey,   There is a referral in to Dr Darby.   Thanks

## 2020-09-23 ENCOUNTER — TELEPHONE (OUTPATIENT)
Dept: FAMILY MEDICINE | Facility: CLINIC | Age: 70
End: 2020-09-23

## 2020-09-23 ENCOUNTER — PATIENT MESSAGE (OUTPATIENT)
Dept: GASTROENTEROLOGY | Facility: CLINIC | Age: 70
End: 2020-09-23

## 2020-09-23 NOTE — TELEPHONE ENCOUNTER
Spoke with patient. Informed patient that he would need to contact Dr Darby's office for a sooner appointment. Pt states that he is awaiting a return call from their office.

## 2020-09-23 NOTE — TELEPHONE ENCOUNTER
----- Message from Ish Schafer sent at 9/23/2020  9:34 AM CDT -----  Type:  Needs Medical Advice    Who Called: Gerald  Symptoms (please be specific): pt is to see Dr. Darby in gastro on 12/15, but he wants a sooner appt and wanted to speak with Dr. Tellez about what he should do, because he wants to be seen soon  How long has patient had these symptoms:  unknown  Pharmacy name and phone #:  n/a  Would the patient rather a call back or a response via [facility]? Call back  Best Call Back Number: [phone]  Additional Information: pt would like to get a call back regarding the referral that was made for him to see Dr. Darby

## 2020-09-24 ENCOUNTER — PATIENT MESSAGE (OUTPATIENT)
Dept: GASTROENTEROLOGY | Facility: CLINIC | Age: 70
End: 2020-09-24

## 2020-09-25 ENCOUNTER — PATIENT MESSAGE (OUTPATIENT)
Dept: GASTROENTEROLOGY | Facility: CLINIC | Age: 70
End: 2020-09-25

## 2020-09-28 ENCOUNTER — PATIENT OUTREACH (OUTPATIENT)
Dept: ADMINISTRATIVE | Facility: OTHER | Age: 70
End: 2020-09-28

## 2020-09-28 NOTE — PROGRESS NOTES
Health Maintenance Due   Topic Date Due    Hepatitis C Screening  1950    TETANUS VACCINE  08/05/1966    Shingles Vaccine (1 of 2) 08/05/1998    Pneumococcal Vaccine (65+ Low/Medium Risk) (1 of 2 - PCV13) 08/05/2013    Influenza Vaccine (1) 08/01/2020     Updates were requested from care everywhere.  Chart was reviewed for overdue Proactive [facility] ([facility]) topics (CRS, Breast Cancer Screening, Eye exam)  Health Maintenance has been updated.  LINKS immunization registry triggered.  Immunizations were reconciled.

## 2020-09-29 ENCOUNTER — LAB VISIT (OUTPATIENT)
Dept: LAB | Facility: HOSPITAL | Age: 70
End: 2020-09-29
Attending: INTERNAL MEDICINE
Payer: COMMERCIAL

## 2020-09-29 ENCOUNTER — PATIENT MESSAGE (OUTPATIENT)
Dept: FAMILY MEDICINE | Facility: CLINIC | Age: 70
End: 2020-09-29

## 2020-09-29 ENCOUNTER — OFFICE VISIT (OUTPATIENT)
Dept: GASTROENTEROLOGY | Facility: CLINIC | Age: 70
End: 2020-09-29
Payer: COMMERCIAL

## 2020-09-29 VITALS
BODY MASS INDEX: 24.36 KG/M2 | SYSTOLIC BLOOD PRESSURE: 177 MMHG | WEIGHT: 164.44 LBS | DIASTOLIC BLOOD PRESSURE: 80 MMHG | HEART RATE: 78 BPM | HEIGHT: 69 IN

## 2020-09-29 DIAGNOSIS — R14.0 BLOATING: ICD-10-CM

## 2020-09-29 DIAGNOSIS — R19.4 CHANGE IN BOWEL HABITS: ICD-10-CM

## 2020-09-29 DIAGNOSIS — R14.0 ABDOMINAL DISTENSION (GASEOUS): ICD-10-CM

## 2020-09-29 DIAGNOSIS — R19.4 CHANGE IN BOWEL HABITS: Primary | ICD-10-CM

## 2020-09-29 DIAGNOSIS — R19.5 LOOSE STOOLS: ICD-10-CM

## 2020-09-29 LAB
ALBUMIN SERPL BCP-MCNC: 4.4 G/DL (ref 3.5–5.2)
ALP SERPL-CCNC: 45 U/L (ref 55–135)
ALT SERPL W/O P-5'-P-CCNC: 21 U/L (ref 10–44)
ANION GAP SERPL CALC-SCNC: 10 MMOL/L (ref 8–16)
AST SERPL-CCNC: 20 U/L (ref 10–40)
BASOPHILS # BLD AUTO: 0.04 K/UL (ref 0–0.2)
BASOPHILS NFR BLD: 0.5 % (ref 0–1.9)
BILIRUB SERPL-MCNC: 0.6 MG/DL (ref 0.1–1)
BUN SERPL-MCNC: 15 MG/DL (ref 8–23)
CALCIUM SERPL-MCNC: 9.4 MG/DL (ref 8.7–10.5)
CHLORIDE SERPL-SCNC: 105 MMOL/L (ref 95–110)
CO2 SERPL-SCNC: 27 MMOL/L (ref 23–29)
CREAT SERPL-MCNC: 1.1 MG/DL (ref 0.5–1.4)
CRP SERPL-MCNC: 1 MG/L (ref 0–8.2)
DIFFERENTIAL METHOD: ABNORMAL
EOSINOPHIL # BLD AUTO: 0.1 K/UL (ref 0–0.5)
EOSINOPHIL NFR BLD: 1 % (ref 0–8)
ERYTHROCYTE [DISTWIDTH] IN BLOOD BY AUTOMATED COUNT: 12.1 % (ref 11.5–14.5)
EST. GFR  (AFRICAN AMERICAN): >60 ML/MIN/1.73 M^2
EST. GFR  (NON AFRICAN AMERICAN): >60 ML/MIN/1.73 M^2
GLUCOSE SERPL-MCNC: 119 MG/DL (ref 70–110)
HCT VFR BLD AUTO: 49 % (ref 40–54)
HGB BLD-MCNC: 15.8 G/DL (ref 14–18)
IGA SERPL-MCNC: 339 MG/DL (ref 40–350)
IMM GRANULOCYTES # BLD AUTO: 0.04 K/UL (ref 0–0.04)
IMM GRANULOCYTES NFR BLD AUTO: 0.5 % (ref 0–0.5)
LYMPHOCYTES # BLD AUTO: 1.1 K/UL (ref 1–4.8)
LYMPHOCYTES NFR BLD: 14.3 % (ref 18–48)
MCH RBC QN AUTO: 29.3 PG (ref 27–31)
MCHC RBC AUTO-ENTMCNC: 32.2 G/DL (ref 32–36)
MCV RBC AUTO: 91 FL (ref 82–98)
MONOCYTES # BLD AUTO: 0.6 K/UL (ref 0.3–1)
MONOCYTES NFR BLD: 8.7 % (ref 4–15)
NEUTROPHILS # BLD AUTO: 5.5 K/UL (ref 1.8–7.7)
NEUTROPHILS NFR BLD: 75 % (ref 38–73)
NRBC BLD-RTO: 0 /100 WBC
PLATELET # BLD AUTO: 216 K/UL (ref 150–350)
PMV BLD AUTO: 10 FL (ref 9.2–12.9)
POTASSIUM SERPL-SCNC: 4.5 MMOL/L (ref 3.5–5.1)
PROT SERPL-MCNC: 7.7 G/DL (ref 6–8.4)
RBC # BLD AUTO: 5.39 M/UL (ref 4.6–6.2)
SODIUM SERPL-SCNC: 142 MMOL/L (ref 136–145)
TSH SERPL DL<=0.005 MIU/L-ACNC: 1.19 UIU/ML (ref 0.4–4)
WBC # BLD AUTO: 7.35 K/UL (ref 3.9–12.7)

## 2020-09-29 PROCEDURE — 83516 IMMUNOASSAY NONANTIBODY: CPT

## 2020-09-29 PROCEDURE — 82784 ASSAY IGA/IGD/IGG/IGM EACH: CPT

## 2020-09-29 PROCEDURE — 85025 COMPLETE CBC W/AUTO DIFF WBC: CPT

## 2020-09-29 PROCEDURE — 99204 PR OFFICE/OUTPT VISIT, NEW, LEVL IV, 45-59 MIN: ICD-10-PCS | Mod: S$GLB,,, | Performed by: INTERNAL MEDICINE

## 2020-09-29 PROCEDURE — 80053 COMPREHEN METABOLIC PANEL: CPT

## 2020-09-29 PROCEDURE — 86140 C-REACTIVE PROTEIN: CPT

## 2020-09-29 PROCEDURE — 99204 OFFICE O/P NEW MOD 45 MIN: CPT | Mod: S$GLB,,, | Performed by: INTERNAL MEDICINE

## 2020-09-29 PROCEDURE — 84443 ASSAY THYROID STIM HORMONE: CPT

## 2020-09-29 PROCEDURE — 99999 PR PBB SHADOW E&M-EST. PATIENT-LVL III: CPT | Mod: PBBFAC,,, | Performed by: INTERNAL MEDICINE

## 2020-09-29 PROCEDURE — 99999 PR PBB SHADOW E&M-EST. PATIENT-LVL III: ICD-10-PCS | Mod: PBBFAC,,, | Performed by: INTERNAL MEDICINE

## 2020-09-29 PROCEDURE — 36415 COLL VENOUS BLD VENIPUNCTURE: CPT

## 2020-09-29 NOTE — PROGRESS NOTES
GASTROENTEROLOGY CLINIC NOTE    Reason for visit: The primary encounter diagnosis was Change in bowel habits. Diagnoses of Abdominal distension (gaseous) and Bloating were also pertinent to this visit.  Referring provider/PCP: Phil Tellez MD    HPI:  Gerald Wilkinson is a 70 y.o. male here today for loose stools, new patient.    Beginning of July. Felt he had stomach bug, but no vomting or diarrhea, just didn't feel 'right. Initially lost maybe 5 lbs. Since has been wt neutral for past year, on chart review.  Large BM every AM. + gas, + bloat, Upper to mid back pain. Mid thoracic. Has been to chiropracter at least 4 times.   Tingling in his neck glands, swelling in his face.  Brings a notebook of all his events.    Treatments tried:  immodium - relieved the symptoms completely  Metamucil - used to do it daily for many years, but now stopped  Align probiotics - ultimately somewhat helped but not really  citrucel - didn't help    New medications: none  Antibiotics: none  Travel: [place]  Sick contacts: no  Blood / mucus: no  Gallbladder: in situ  Prior GI surgeries: no  Imaging: none recent    Mother had CRC    Prior Endoscopy:  EGD:    Colon:  2019 [facility]  12 mm rectosimgoid polyp, path TA  Diverticulosis  Recd 5 year repeat    (Portions of this note were dictated using voice recognition software and may contain dictation related errors in spelling/grammar/syntax not found on text review)    Review of Systems   Constitutional: Negative for fever and weight loss.   HENT: Negative for nosebleeds and sore throat.    Eyes: Negative for double vision and photophobia.   Respiratory: Negative for cough and shortness of breath.    Cardiovascular: Negative for chest pain and leg swelling.   Gastrointestinal: Positive for diarrhea. Negative for blood in stool and melena.   Genitourinary: Negative for dysuria and hematuria.   Musculoskeletal: Negative for joint pain and neck pain.   Skin: Negative for itching  "and rash.   Neurological: Positive for tingling. Negative for dizziness and headaches.   Psychiatric/Behavioral: Negative for hallucinations. The patient does not have insomnia.        Past Medical History: has no past medical history on file.    Past Surgical History: has no past surgical history on file.    Family History:family history includes Cancer in his mother and sister; Hypertension in his mother and sister.    Allergies: Review of patient's allergies indicates:  No Known Allergies    Social History: reports that he has never smoked. He does not have any smokeless tobacco history on file. He reports current drug use. He reports that he does not drink alcohol.    Home medications:   Current Outpatient Medications on File Prior to Visit   Medication Sig Dispense Refill    DIOVAN 160 mg tablet Take 1 tablet (160 mg total) by mouth once daily. 90 tablet 3    multivitamin capsule Take 1 capsule by mouth once daily.      pantoprazole (PROTONIX) 40 MG tablet Take 1 tablet (40 mg total) by mouth once daily. 30 tablet 5    tadalafiL (CIALIS) 20 MG Tab Take 1 tablet (20 mg total) by mouth once daily. 6 tablet 1    zolpidem (AMBIEN) 5 MG Tab Take 1 tablet (5 mg total) by mouth nightly as needed. 30 tablet 0    [DISCONTINUED] sildenafil (VIAGRA) 100 MG tablet Take 1 tablet (100 mg total) by mouth daily as needed for Erectile Dysfunction. 10 tablet 0     No current facility-administered medications on file prior to visit.        Vital signs:  BP (!) 177/80   Pulse 78   Ht 5' 9" (1.753 m)   Wt 74.6 kg (164 lb 7.4 oz)   BMI 24.29 kg/m²     Physical Exam  Vitals signs reviewed.   Constitutional:       General: He is not in acute distress.     Appearance: He is well-developed.   HENT:      Head: Normocephalic and atraumatic.   Eyes:      General: No scleral icterus.     Conjunctiva/sclera: Conjunctivae normal.   Neck:      Musculoskeletal: Neck supple.      Thyroid: No thyromegaly.   Cardiovascular:      Rate and " Rhythm: Normal rate and regular rhythm.   Pulmonary:      Effort: Pulmonary effort is normal. No respiratory distress.      Breath sounds: Normal breath sounds.   Abdominal:      General: There is no distension.      Palpations: Abdomen is soft. There is no mass.      Comments: Tympanic throughout   Musculoskeletal: Normal range of motion.         General: No tenderness.   Lymphadenopathy:      Cervical: No cervical adenopathy.   Skin:     General: Skin is warm and dry.      Findings: No rash.   Neurological:      Mental Status: He is alert and oriented to person, place, and time.      Gait: Gait normal.   Psychiatric:         Mood and Affect: Mood normal.         Behavior: Behavior normal.         Routine labs:  Lab Results   Component Value Date    WBC 7.35 09/29/2020    HGB 15.8 09/29/2020    HCT 49.0 09/29/2020    MCV 91 09/29/2020     09/29/2020     No results found for: INR  No results found for: IRON, FERRITIN, TIBC, FESATURATED  Lab Results   Component Value Date     04/13/2020    K 4.1 04/13/2020     04/13/2020    CO2 25 04/13/2020    BUN 15 04/13/2020    CREATININE 1.1 04/13/2020     Lab Results   Component Value Date    ALBUMIN 4.2 04/13/2020    ALT 20 04/13/2020    AST 22 04/13/2020    ALKPHOS 44 (L) 04/13/2020    BILITOT 0.9 04/13/2020     No results found for: GLUCOSE    I have reviewed prior labs, imaging, notes from last month      Assessment:  1. Change in bowel habits    2. Abdominal distension (gaseous)    3. Bloating      Unclear etiology, but most concerning is the mid back pain.  He has done lots of online reading and believes he has cancer  I wonder if stress is playing a role as well.    Plan:  Orders Placed This Encounter    Clostridium difficile EIA    Stool culture    CT Abdomen Pelvis With Contrast    Tissue transglutaminase, IgA    IgA    C-Reactive Protein    Giardia / Cryptosporidum, EIA    Calprotectin    Stool Exam-Ova,Cysts,Parasites    WBC, Stool     Pancreatic elastase, fecal    Fecal fat, qualitative    CBC auto differential    TSH    Comprehensive metabolic panel     Stool studies  Basic labs    CT abd pelvis , pancreas protocol given mid back pain and change in bowel habits.    Ok to use immodium  Can consider trial of bentyl or librax in future    RTC based on above      Rahat Escoto MD  [facility] [place]    A total of 46 minutes were spent face-to-face with the patient during this encounter, and over half of that time was spent on counseling and coordination of care.

## 2020-09-30 ENCOUNTER — LAB VISIT (OUTPATIENT)
Dept: LAB | Facility: HOSPITAL | Age: 70
End: 2020-09-30
Attending: INTERNAL MEDICINE
Payer: COMMERCIAL

## 2020-09-30 DIAGNOSIS — R14.0 ABDOMINAL DISTENSION (GASEOUS): ICD-10-CM

## 2020-09-30 DIAGNOSIS — R19.4 CHANGE IN BOWEL HABITS: ICD-10-CM

## 2020-09-30 DIAGNOSIS — R14.0 BLOATING: ICD-10-CM

## 2020-09-30 LAB
C DIFF GDH STL QL: NEGATIVE
C DIFF TOX A+B STL QL IA: NEGATIVE
WBC #/AREA STL HPF: NORMAL /[HPF]

## 2020-09-30 PROCEDURE — 82656 EL-1 FECAL QUAL/SEMIQ: CPT

## 2020-09-30 PROCEDURE — 87427 SHIGA-LIKE TOXIN AG IA: CPT

## 2020-09-30 PROCEDURE — 87329 GIARDIA AG IA: CPT

## 2020-09-30 PROCEDURE — 89055 LEUKOCYTE ASSESSMENT FECAL: CPT

## 2020-09-30 PROCEDURE — 83993 ASSAY FOR CALPROTECTIN FECAL: CPT

## 2020-09-30 PROCEDURE — 89125 SPECIMEN FAT STAIN: CPT

## 2020-09-30 PROCEDURE — 87324 CLOSTRIDIUM AG IA: CPT

## 2020-09-30 PROCEDURE — 87046 STOOL CULTR AEROBIC BACT EA: CPT

## 2020-09-30 PROCEDURE — 87209 SMEAR COMPLEX STAIN: CPT

## 2020-09-30 PROCEDURE — 87045 FECES CULTURE AEROBIC BACT: CPT

## 2020-09-30 PROCEDURE — 87449 NOS EACH ORGANISM AG IA: CPT

## 2020-10-01 LAB
CRYPTOSP AG STL QL IA: NEGATIVE
E COLI SXT1 STL QL IA: NEGATIVE
E COLI SXT2 STL QL IA: NEGATIVE
G LAMBLIA AG STL QL IA: NEGATIVE
TTG IGA SER-ACNC: 6 UNITS

## 2020-10-02 ENCOUNTER — HOSPITAL ENCOUNTER (OUTPATIENT)
Dept: RADIOLOGY | Facility: HOSPITAL | Age: 70
Discharge: HOME | End: 2020-10-02
Attending: INTERNAL MEDICINE
Payer: COMMERCIAL

## 2020-10-02 DIAGNOSIS — R19.4 CHANGE IN BOWEL HABITS: ICD-10-CM

## 2020-10-02 DIAGNOSIS — R14.0 BLOATING: ICD-10-CM

## 2020-10-02 DIAGNOSIS — R14.0 ABDOMINAL DISTENSION (GASEOUS): ICD-10-CM

## 2020-10-02 LAB
ELASTASE 1, FECAL: 328 MCG/G
O+P STL MICRO: NORMAL

## 2020-10-02 PROCEDURE — 74177 CT ABD & PELVIS W/CONTRAST: CPT | Mod: 26,,, | Performed by: RADIOLOGY

## 2020-10-02 PROCEDURE — 74177 CT ABD & PELVIS W/CONTRAST: CPT | Mod: TC

## 2020-10-02 PROCEDURE — 74177 CT ABDOMEN PELVIS WITH CONTRAST: ICD-10-PCS | Mod: 26,,, | Performed by: RADIOLOGY

## 2020-10-02 PROCEDURE — 25500020 PHARM REV CODE 255: Performed by: INTERNAL MEDICINE

## 2020-10-02 RX ADMIN — IOHEXOL 75 ML: 350 INJECTION, SOLUTION INTRAVENOUS at 11:10

## 2020-10-03 LAB — BACTERIA STL CULT: NORMAL

## 2020-10-04 ENCOUNTER — PATIENT MESSAGE (OUTPATIENT)
Dept: FAMILY MEDICINE | Facility: CLINIC | Age: 70
End: 2020-10-04

## 2020-10-06 LAB — FAT STL SUDAN IV STN: NORMAL

## 2020-10-07 LAB — CALPROTECTIN STL-MCNT: <27.1 MCG/G

## 2020-10-08 ENCOUNTER — PATIENT MESSAGE (OUTPATIENT)
Dept: OTOLARYNGOLOGY | Facility: CLINIC | Age: 70
End: 2020-10-08

## 2020-10-15 RX ORDER — VALSARTAN 160 MG/1
160 TABLET ORAL DAILY
Qty: 90 TABLET | Refills: 3 | Status: SHIPPED | OUTPATIENT
Start: 2020-10-15 | End: 2020-10-20 | Stop reason: SDUPTHER

## 2020-10-20 ENCOUNTER — OFFICE VISIT (OUTPATIENT)
Dept: FAMILY MEDICINE | Facility: CLINIC | Age: 70
End: 2020-10-20
Payer: COMMERCIAL

## 2020-10-20 VITALS
OXYGEN SATURATION: 98 % | WEIGHT: 163.81 LBS | HEIGHT: 69 IN | HEART RATE: 70 BPM | BODY MASS INDEX: 24.26 KG/M2 | DIASTOLIC BLOOD PRESSURE: 80 MMHG | TEMPERATURE: 98 F | RESPIRATION RATE: 18 BRPM | SYSTOLIC BLOOD PRESSURE: 124 MMHG

## 2020-10-20 DIAGNOSIS — R19.5 LOOSE STOOLS: Primary | ICD-10-CM

## 2020-10-20 DIAGNOSIS — I10 BENIGN ESSENTIAL HYPERTENSION: ICD-10-CM

## 2020-10-20 PROCEDURE — 3074F PR MOST RECENT SYSTOLIC BLOOD PRESSURE < 130 MM HG: ICD-10-PCS | Mod: CPTII,S$GLB,, | Performed by: INTERNAL MEDICINE

## 2020-10-20 PROCEDURE — 99999 PR PBB SHADOW E&M-EST. PATIENT-LVL IV: ICD-10-PCS | Mod: PBBFAC,,, | Performed by: INTERNAL MEDICINE

## 2020-10-20 PROCEDURE — 1159F MED LIST DOCD IN RCRD: CPT | Mod: S$GLB,,, | Performed by: INTERNAL MEDICINE

## 2020-10-20 PROCEDURE — 1159F PR MEDICATION LIST DOCUMENTED IN MEDICAL RECORD: ICD-10-PCS | Mod: S$GLB,,, | Performed by: INTERNAL MEDICINE

## 2020-10-20 PROCEDURE — 3008F BODY MASS INDEX DOCD: CPT | Mod: CPTII,S$GLB,, | Performed by: INTERNAL MEDICINE

## 2020-10-20 PROCEDURE — 99213 PR OFFICE/OUTPT VISIT, EST, LEVL III, 20-29 MIN: ICD-10-PCS | Mod: S$GLB,,, | Performed by: INTERNAL MEDICINE

## 2020-10-20 PROCEDURE — 1126F AMNT PAIN NOTED NONE PRSNT: CPT | Mod: S$GLB,,, | Performed by: INTERNAL MEDICINE

## 2020-10-20 PROCEDURE — 1126F PR PAIN SEVERITY QUANTIFIED, NO PAIN PRESENT: ICD-10-PCS | Mod: S$GLB,,, | Performed by: INTERNAL MEDICINE

## 2020-10-20 PROCEDURE — 3074F SYST BP LT 130 MM HG: CPT | Mod: CPTII,S$GLB,, | Performed by: INTERNAL MEDICINE

## 2020-10-20 PROCEDURE — 3008F PR BODY MASS INDEX (BMI) DOCUMENTED: ICD-10-PCS | Mod: CPTII,S$GLB,, | Performed by: INTERNAL MEDICINE

## 2020-10-20 PROCEDURE — 3079F PR MOST RECENT DIASTOLIC BLOOD PRESSURE 80-89 MM HG: ICD-10-PCS | Mod: CPTII,S$GLB,, | Performed by: INTERNAL MEDICINE

## 2020-10-20 PROCEDURE — 1101F PT FALLS ASSESS-DOCD LE1/YR: CPT | Mod: CPTII,S$GLB,, | Performed by: INTERNAL MEDICINE

## 2020-10-20 PROCEDURE — 3079F DIAST BP 80-89 MM HG: CPT | Mod: CPTII,S$GLB,, | Performed by: INTERNAL MEDICINE

## 2020-10-20 PROCEDURE — 99999 PR PBB SHADOW E&M-EST. PATIENT-LVL IV: CPT | Mod: PBBFAC,,, | Performed by: INTERNAL MEDICINE

## 2020-10-20 PROCEDURE — 99213 OFFICE O/P EST LOW 20 MIN: CPT | Mod: S$GLB,,, | Performed by: INTERNAL MEDICINE

## 2020-10-20 PROCEDURE — 1101F PR PT FALLS ASSESS DOC 0-1 FALLS W/OUT INJ PAST YR: ICD-10-PCS | Mod: CPTII,S$GLB,, | Performed by: INTERNAL MEDICINE

## 2020-10-20 RX ORDER — VALSARTAN 160 MG/1
160 TABLET ORAL DAILY
Qty: 90 TABLET | Refills: 3 | Status: SHIPPED | OUTPATIENT
Start: 2020-10-20 | End: 2021-12-15 | Stop reason: SDUPTHER

## 2020-10-20 NOTE — PROGRESS NOTES
[facility] Note    Chief Complaint      Chief Complaint   Patient presents with    Diarrhea     check up        History of Present Illness      Gerald iWlkinson is a 70 y.o. male who presents today for   Chief Complaint   Patient presents with    Diarrhea     check up    .  Patient comes to appointment here for chronic issues with diarrhea . Has had gi workup with dr owusu told all normal . He is currently taking  citrucel it seems to be improving currently after 10 days . Is now having one bowel movement in am and texture is getting more back to normal.   HPI    No problem-specific Assessment & Plan notes found for this encounter.       Problem List Items Addressed This Visit        Cardiac/Vascular    Benign essential hypertension    Overview     bp well controled on current regimen cont same            GI    Loose stools - Primary    Overview     Is now on citrucel which seems to be helping better than  Metamucil . Cont current regimen                 Past Medical History:  History reviewed. No pertinent past medical history.    Past Surgical History:  History reviewed. No pertinent surgical history.    Family History:  family history includes Cancer in his mother and sister; Hypertension in his mother and sister.     Social History:  Social History     Socioeconomic History    Marital status:      Spouse name: Not on file    Number of children: Not on file    Years of education: Not on file    Highest education level: Not on file   Occupational History    Not on file   Social Needs    Financial resource strain: Not on file    Food insecurity     Worry: Not on file     Inability: Not on file    Transportation needs     Medical: Not on file     Non-medical: Not on file   Tobacco Use    Smoking status: Never Smoker   Substance and Sexual Activity    Alcohol use: No    Drug use: Yes    Sexual activity: Yes     Partners: Female   Lifestyle    Physical activity     Days  per week: 7 days     Minutes per session: 60 min    Stress: Not at all   Relationships    Social connections     Talks on phone: More than three times a week     Gets together: More than three times a week     Attends Sikhism service: Not on file     Active member of club or organization: Yes     Attends meetings of clubs or organizations: More than 4 times per year     Relationship status:    Other Topics Concern    Not on file   Social History Narrative    Not on file       Review of Systems:   Review of Systems   HENT: Negative for hearing loss.    Eyes: Negative for discharge.   Respiratory: Negative for wheezing.    Cardiovascular: Negative for chest pain and palpitations.   Gastrointestinal: Positive for diarrhea. Negative for blood in stool, constipation and vomiting.   Genitourinary: Negative for hematuria and urgency.   Musculoskeletal: Positive for neck pain.   Neurological: Negative for weakness and headaches.   Endo/Heme/Allergies: Negative for polydipsia.   Psychiatric/Behavioral: Negative for depression. The patient is not nervous/anxious.         Medications:  Outpatient Encounter Medications as of 10/20/2020   Medication Sig Dispense Refill    DIOVAN 160 mg tablet Take 1 tablet (160 mg total) by mouth once daily. 90 tablet 3    multivitamin capsule Take 1 capsule by mouth once daily.      pantoprazole (PROTONIX) 40 MG tablet Take 1 tablet (40 mg total) by mouth once daily. 30 tablet 5    tadalafiL (CIALIS) 20 MG Tab Take 1 tablet (20 mg total) by mouth once daily. 6 tablet 1    zolpidem (AMBIEN) 5 MG Tab Take 1 tablet (5 mg total) by mouth nightly as needed. 30 tablet 0    [DISCONTINUED] DIOVAN 160 mg tablet Take 1 tablet (160 mg total) by mouth once daily. 90 tablet 3     No facility-administered encounter medications on file as of 10/20/2020.        Allergies:  Review of patient's allergies indicates:  No Known Allergies      Physical Exam      Vitals:    10/20/20 1433   BP:  "124/80   Pulse: 70   Resp: 18   Temp: 97.6 °F (36.4 °C)        Vital Signs  Temp: 97.6 °F (36.4 °C)  Temp src: Oral  Pulse: 70  Resp: 18  SpO2: 98 %  BP: 124/80  BP Location: Right arm  Patient Position: Sitting  Pain Score: 0-No pain  Height and Weight  Height: 5' 9" (175.3 cm)  Weight: 74.3 kg (163 lb 12.8 oz)  BSA (Calculated - sq m): 1.9 sq meters  BMI (Calculated): 24.2  Weight in (lb) to have BMI = 25: 168.9]     Body mass index is 24.19 kg/m².    Physical Exam  Constitutional:       Appearance: He is well-developed.   HENT:      Head: Normocephalic.   Eyes:      Pupils: Pupils are equal, round, and reactive to light.   Neck:      Musculoskeletal: Normal range of motion.      Thyroid: No thyromegaly.   Cardiovascular:      Rate and Rhythm: Normal rate and regular rhythm.      Heart sounds: No murmur. No friction rub. No gallop.    Pulmonary:      Effort: Pulmonary effort is normal.      Breath sounds: Normal breath sounds.   Abdominal:      General: Bowel sounds are normal.      Palpations: Abdomen is soft. There is no hepatomegaly or splenomegaly.   Musculoskeletal: Normal range of motion.   Skin:     General: Skin is warm and dry.   Neurological:      Mental Status: He is alert and oriented to person, place, and time.      Sensory: No sensory deficit.   Psychiatric:         Behavior: Behavior normal.          Laboratory:  CBC:  Recent Labs   Lab Result Units 09/29/20  0848   WBC K/uL 7.35   RBC M/uL 5.39   Hemoglobin g/dL 15.8   Hematocrit % 49.0   Platelets K/uL 216   Mean Corpuscular Volume fL 91   Mean Corpuscular Hemoglobin pg 29.3   Mean Corpuscular Hemoglobin Conc g/dL 32.2     CMP:  Recent Labs   Lab Result Units 09/29/20  0848   Glucose mg/dL 119*   Calcium mg/dL 9.4   Albumin g/dL 4.4   Total Protein g/dL 7.7   Sodium mmol/L 142   Potassium mmol/L 4.5   CO2 mmol/L 27   Chloride mmol/L 105   BUN, Bld mg/dL 15   Alkaline Phosphatase U/L 45*   ALT U/L 21   AST U/L 20   Total Bilirubin mg/dL 0.6 "     URINALYSIS:  No results for input(s): COLORU, CLARITYU, SPECGRAV, PHUR, PROTEINUA, GLUCOSEU, BILIRUBINCON, BLOODU, WBCU, RBCU, BACTERIA, MUCUS, NITRITE, LEUKOCYTESUR, UROBILINOGEN, HYALINECASTS in the last 2160 hours.   LIPIDS:  Recent Labs   Lab Result Units 09/29/20  0848   TSH uIU/mL 1.192     TSH:  Recent Labs   Lab Result Units 09/29/20  0848   TSH uIU/mL 1.192     A1C:  No results for input(s): HGBA1C in the last 2160 hours.    Radiology:        Assessment:     Gerald Wilkinson is a 70 y.o.male with:    Loose stools    Benign essential hypertension  -     DIOVAN 160 mg tablet; Take 1 tablet (160 mg total) by mouth once daily.  Dispense: 90 tablet; Refill: 3                Plan:     Problem List Items Addressed This Visit        Cardiac/Vascular    Benign essential hypertension    Overview     bp well controled on current regimen cont same            GI    Loose stools - Primary    Overview     Is now on citrucel which seems to be helping better than  Metamucil . Cont current regimen                As above, continue current medications and maintain follow up with specialists.  Return to clinic in 6 months.      Frederick W Dantagnan [facility] [place]                  Answers for HPI/ROS submitted by the patient on 10/18/2020   activity change: No  unexpected weight change: No  rhinorrhea: No  trouble swallowing: No  visual disturbance: No  chest tightness: No  polyuria: No  difficulty urinating: No  joint swelling: No  arthralgias: No  confusion: No  dysphoric mood: No

## 2020-10-21 ENCOUNTER — TELEPHONE (OUTPATIENT)
Dept: FAMILY MEDICINE | Facility: CLINIC | Age: 70
End: 2020-10-21

## 2020-10-21 NOTE — TELEPHONE ENCOUNTER
CaseId:[id number];Status:Approved;Review Type:Prior Auth;Coverage Start Date:09/21/2020;Coverage End Date:10/21/2021;    PA for Diovan approved.

## 2020-11-13 ENCOUNTER — TELEPHONE (OUTPATIENT)
Dept: FAMILY MEDICINE | Facility: CLINIC | Age: 70
End: 2020-11-13

## 2020-11-13 ENCOUNTER — LAB VISIT (OUTPATIENT)
Dept: LAB | Facility: HOSPITAL | Age: 70
End: 2020-11-13
Attending: INTERNAL MEDICINE
Payer: COMMERCIAL

## 2020-11-13 DIAGNOSIS — Z86.19 HISTORY OF VIRAL ILLNESS: ICD-10-CM

## 2020-11-13 DIAGNOSIS — Z86.19 HISTORY OF VIRAL ILLNESS: Primary | ICD-10-CM

## 2020-11-13 LAB — SARS-COV-2 IGG SERPLBLD QL IA.RAPID: NEGATIVE

## 2020-11-13 PROCEDURE — 36415 COLL VENOUS BLD VENIPUNCTURE: CPT

## 2020-11-13 PROCEDURE — 86769 SARS-COV-2 COVID-19 ANTIBODY: CPT

## 2020-11-13 NOTE — TELEPHONE ENCOUNTER
Called and spoke with pt in regards of his message. Pt states that he would like a COVID Antibody test placed in the [facility] system. Please place orders in system for patient please. Please advise.

## 2020-11-13 NOTE — TELEPHONE ENCOUNTER
Called and spoke with pt in regards of making his COVID Antibody lab appt. Pt made his appt for 11:30 today.

## 2020-11-13 NOTE — TELEPHONE ENCOUNTER
----- Message from Thalia Avila sent at 11/13/2020  8:11 AM CST -----  Contact: self [phone]  Patient is calling to get orders for the antibody test. Please call

## 2020-11-23 PROBLEM — Z00.00 ANNUAL PHYSICAL EXAM: Status: RESOLVED | Noted: 2020-08-18 | Resolved: 2020-11-23

## 2020-12-14 ENCOUNTER — PATIENT MESSAGE (OUTPATIENT)
Dept: FAMILY MEDICINE | Facility: CLINIC | Age: 70
End: 2020-12-14

## 2021-03-10 ENCOUNTER — OFFICE VISIT (OUTPATIENT)
Dept: FAMILY MEDICINE | Facility: CLINIC | Age: 71
End: 2021-03-10
Payer: COMMERCIAL

## 2021-03-10 VITALS
WEIGHT: 167.31 LBS | SYSTOLIC BLOOD PRESSURE: 120 MMHG | HEART RATE: 68 BPM | HEIGHT: 69 IN | BODY MASS INDEX: 24.78 KG/M2 | DIASTOLIC BLOOD PRESSURE: 74 MMHG | TEMPERATURE: 98 F | RESPIRATION RATE: 18 BRPM | OXYGEN SATURATION: 98 %

## 2021-03-10 DIAGNOSIS — Z11.59 ENCOUNTER FOR HEPATITIS C SCREENING TEST FOR LOW RISK PATIENT: ICD-10-CM

## 2021-03-10 DIAGNOSIS — F51.01 PRIMARY INSOMNIA: ICD-10-CM

## 2021-03-10 DIAGNOSIS — N52.9 ERECTILE DYSFUNCTION, UNSPECIFIED ERECTILE DYSFUNCTION TYPE: ICD-10-CM

## 2021-03-10 DIAGNOSIS — E78.5 BORDERLINE HYPERLIPIDEMIA: ICD-10-CM

## 2021-03-10 DIAGNOSIS — K21.9 GASTROESOPHAGEAL REFLUX DISEASE WITHOUT ESOPHAGITIS: ICD-10-CM

## 2021-03-10 DIAGNOSIS — Z12.5 PROSTATE CANCER SCREENING: ICD-10-CM

## 2021-03-10 DIAGNOSIS — I10 BENIGN ESSENTIAL HYPERTENSION: Primary | ICD-10-CM

## 2021-03-10 PROCEDURE — 1159F MED LIST DOCD IN RCRD: CPT | Mod: S$GLB,,, | Performed by: INTERNAL MEDICINE

## 2021-03-10 PROCEDURE — 3008F BODY MASS INDEX DOCD: CPT | Mod: CPTII,S$GLB,, | Performed by: INTERNAL MEDICINE

## 2021-03-10 PROCEDURE — 3078F PR MOST RECENT DIASTOLIC BLOOD PRESSURE < 80 MM HG: ICD-10-PCS | Mod: CPTII,S$GLB,, | Performed by: INTERNAL MEDICINE

## 2021-03-10 PROCEDURE — 3008F PR BODY MASS INDEX (BMI) DOCUMENTED: ICD-10-PCS | Mod: CPTII,S$GLB,, | Performed by: INTERNAL MEDICINE

## 2021-03-10 PROCEDURE — 3288F PR FALLS RISK ASSESSMENT DOCUMENTED: ICD-10-PCS | Mod: CPTII,S$GLB,, | Performed by: INTERNAL MEDICINE

## 2021-03-10 PROCEDURE — 3078F DIAST BP <80 MM HG: CPT | Mod: CPTII,S$GLB,, | Performed by: INTERNAL MEDICINE

## 2021-03-10 PROCEDURE — 3288F FALL RISK ASSESSMENT DOCD: CPT | Mod: CPTII,S$GLB,, | Performed by: INTERNAL MEDICINE

## 2021-03-10 PROCEDURE — 1101F PT FALLS ASSESS-DOCD LE1/YR: CPT | Mod: CPTII,S$GLB,, | Performed by: INTERNAL MEDICINE

## 2021-03-10 PROCEDURE — 1126F AMNT PAIN NOTED NONE PRSNT: CPT | Mod: S$GLB,,, | Performed by: INTERNAL MEDICINE

## 2021-03-10 PROCEDURE — 1126F PR PAIN SEVERITY QUANTIFIED, NO PAIN PRESENT: ICD-10-PCS | Mod: S$GLB,,, | Performed by: INTERNAL MEDICINE

## 2021-03-10 PROCEDURE — 1159F PR MEDICATION LIST DOCUMENTED IN MEDICAL RECORD: ICD-10-PCS | Mod: S$GLB,,, | Performed by: INTERNAL MEDICINE

## 2021-03-10 PROCEDURE — 99999 PR PBB SHADOW E&M-EST. PATIENT-LVL IV: ICD-10-PCS | Mod: PBBFAC,,, | Performed by: INTERNAL MEDICINE

## 2021-03-10 PROCEDURE — 99999 PR PBB SHADOW E&M-EST. PATIENT-LVL IV: CPT | Mod: PBBFAC,,, | Performed by: INTERNAL MEDICINE

## 2021-03-10 PROCEDURE — 3074F SYST BP LT 130 MM HG: CPT | Mod: CPTII,S$GLB,, | Performed by: INTERNAL MEDICINE

## 2021-03-10 PROCEDURE — 99214 PR OFFICE/OUTPT VISIT, EST, LEVL IV, 30-39 MIN: ICD-10-PCS | Mod: S$GLB,,, | Performed by: INTERNAL MEDICINE

## 2021-03-10 PROCEDURE — 3074F PR MOST RECENT SYSTOLIC BLOOD PRESSURE < 130 MM HG: ICD-10-PCS | Mod: CPTII,S$GLB,, | Performed by: INTERNAL MEDICINE

## 2021-03-10 PROCEDURE — 1101F PR PT FALLS ASSESS DOC 0-1 FALLS W/OUT INJ PAST YR: ICD-10-PCS | Mod: CPTII,S$GLB,, | Performed by: INTERNAL MEDICINE

## 2021-03-10 PROCEDURE — 99214 OFFICE O/P EST MOD 30 MIN: CPT | Mod: S$GLB,,, | Performed by: INTERNAL MEDICINE

## 2021-04-15 ENCOUNTER — LAB VISIT (OUTPATIENT)
Dept: LAB | Facility: HOSPITAL | Age: 71
End: 2021-04-15
Attending: INTERNAL MEDICINE
Payer: COMMERCIAL

## 2021-04-15 ENCOUNTER — TELEPHONE (OUTPATIENT)
Dept: FAMILY MEDICINE | Facility: CLINIC | Age: 71
End: 2021-04-15

## 2021-04-15 DIAGNOSIS — Z12.5 PROSTATE CANCER SCREENING: ICD-10-CM

## 2021-04-15 DIAGNOSIS — Z11.59 ENCOUNTER FOR HEPATITIS C SCREENING TEST FOR LOW RISK PATIENT: ICD-10-CM

## 2021-04-15 DIAGNOSIS — E78.5 BORDERLINE HYPERLIPIDEMIA: ICD-10-CM

## 2021-04-15 DIAGNOSIS — I10 BENIGN ESSENTIAL HYPERTENSION: ICD-10-CM

## 2021-04-15 LAB
ALBUMIN SERPL BCP-MCNC: 4.1 G/DL (ref 3.5–5.2)
ALP SERPL-CCNC: 44 U/L (ref 55–135)
ALT SERPL W/O P-5'-P-CCNC: 18 U/L (ref 10–44)
ANION GAP SERPL CALC-SCNC: 7 MMOL/L (ref 8–16)
AST SERPL-CCNC: 21 U/L (ref 10–40)
BASOPHILS # BLD AUTO: 0.03 K/UL (ref 0–0.2)
BASOPHILS NFR BLD: 0.5 % (ref 0–1.9)
BILIRUB SERPL-MCNC: 1.2 MG/DL (ref 0.1–1)
BUN SERPL-MCNC: 19 MG/DL (ref 8–23)
CALCIUM SERPL-MCNC: 9.2 MG/DL (ref 8.7–10.5)
CHLORIDE SERPL-SCNC: 105 MMOL/L (ref 95–110)
CHOLEST SERPL-MCNC: 207 MG/DL (ref 120–199)
CHOLEST/HDLC SERPL: 4.6 {RATIO} (ref 2–5)
CO2 SERPL-SCNC: 27 MMOL/L (ref 23–29)
COMPLEXED PSA SERPL-MCNC: 2.3 NG/ML (ref 0–4)
CREAT SERPL-MCNC: 1.2 MG/DL (ref 0.5–1.4)
DIFFERENTIAL METHOD: NORMAL
EOSINOPHIL # BLD AUTO: 0.3 K/UL (ref 0–0.5)
EOSINOPHIL NFR BLD: 4.3 % (ref 0–8)
ERYTHROCYTE [DISTWIDTH] IN BLOOD BY AUTOMATED COUNT: 12.2 % (ref 11.5–14.5)
EST. GFR  (AFRICAN AMERICAN): >60 ML/MIN/1.73 M^2
EST. GFR  (NON AFRICAN AMERICAN): >60 ML/MIN/1.73 M^2
GLUCOSE SERPL-MCNC: 93 MG/DL (ref 70–110)
HCT VFR BLD AUTO: 50.1 % (ref 40–54)
HCV AB SERPL QL IA: NEGATIVE
HDLC SERPL-MCNC: 45 MG/DL (ref 40–75)
HDLC SERPL: 21.7 % (ref 20–50)
HGB BLD-MCNC: 16.3 G/DL (ref 14–18)
IMM GRANULOCYTES # BLD AUTO: 0.02 K/UL (ref 0–0.04)
IMM GRANULOCYTES NFR BLD AUTO: 0.3 % (ref 0–0.5)
LDLC SERPL CALC-MCNC: 148.6 MG/DL (ref 63–159)
LYMPHOCYTES # BLD AUTO: 1.6 K/UL (ref 1–4.8)
LYMPHOCYTES NFR BLD: 27.5 % (ref 18–48)
MCH RBC QN AUTO: 29.4 PG (ref 27–31)
MCHC RBC AUTO-ENTMCNC: 32.5 G/DL (ref 32–36)
MCV RBC AUTO: 90 FL (ref 82–98)
MONOCYTES # BLD AUTO: 0.7 K/UL (ref 0.3–1)
MONOCYTES NFR BLD: 11.1 % (ref 4–15)
NEUTROPHILS # BLD AUTO: 3.3 K/UL (ref 1.8–7.7)
NEUTROPHILS NFR BLD: 56.3 % (ref 38–73)
NONHDLC SERPL-MCNC: 162 MG/DL
NRBC BLD-RTO: 0 /100 WBC
PLATELET # BLD AUTO: 214 K/UL (ref 150–450)
PMV BLD AUTO: 10.4 FL (ref 9.2–12.9)
POTASSIUM SERPL-SCNC: 4.3 MMOL/L (ref 3.5–5.1)
PROT SERPL-MCNC: 7.7 G/DL (ref 6–8.4)
RBC # BLD AUTO: 5.54 M/UL (ref 4.6–6.2)
SODIUM SERPL-SCNC: 139 MMOL/L (ref 136–145)
TRIGL SERPL-MCNC: 67 MG/DL (ref 30–150)
WBC # BLD AUTO: 5.85 K/UL (ref 3.9–12.7)

## 2021-04-15 PROCEDURE — 86803 HEPATITIS C AB TEST: CPT | Performed by: INTERNAL MEDICINE

## 2021-04-15 PROCEDURE — 84153 ASSAY OF PSA TOTAL: CPT | Performed by: INTERNAL MEDICINE

## 2021-04-15 PROCEDURE — 36415 COLL VENOUS BLD VENIPUNCTURE: CPT | Performed by: INTERNAL MEDICINE

## 2021-04-15 PROCEDURE — 80053 COMPREHEN METABOLIC PANEL: CPT | Performed by: INTERNAL MEDICINE

## 2021-04-15 PROCEDURE — 85025 COMPLETE CBC W/AUTO DIFF WBC: CPT | Performed by: INTERNAL MEDICINE

## 2021-04-15 PROCEDURE — 80061 LIPID PANEL: CPT | Performed by: INTERNAL MEDICINE

## 2021-09-08 ENCOUNTER — PATIENT MESSAGE (OUTPATIENT)
Dept: FAMILY MEDICINE | Facility: CLINIC | Age: 71
End: 2021-09-08

## 2021-09-30 ENCOUNTER — OFFICE VISIT (OUTPATIENT)
Dept: FAMILY MEDICINE | Facility: CLINIC | Age: 71
End: 2021-09-30
Payer: COMMERCIAL

## 2021-09-30 VITALS
HEART RATE: 65 BPM | OXYGEN SATURATION: 98 % | RESPIRATION RATE: 18 BRPM | WEIGHT: 164.56 LBS | DIASTOLIC BLOOD PRESSURE: 70 MMHG | TEMPERATURE: 98 F | SYSTOLIC BLOOD PRESSURE: 130 MMHG | HEIGHT: 69 IN | BODY MASS INDEX: 24.37 KG/M2

## 2021-09-30 DIAGNOSIS — K21.9 GASTROESOPHAGEAL REFLUX DISEASE WITHOUT ESOPHAGITIS: ICD-10-CM

## 2021-09-30 DIAGNOSIS — E78.5 BORDERLINE HYPERLIPIDEMIA: ICD-10-CM

## 2021-09-30 DIAGNOSIS — F51.01 PRIMARY INSOMNIA: ICD-10-CM

## 2021-09-30 DIAGNOSIS — I10 BENIGN ESSENTIAL HYPERTENSION: Primary | ICD-10-CM

## 2021-09-30 DIAGNOSIS — N52.9 ERECTILE DYSFUNCTION, UNSPECIFIED ERECTILE DYSFUNCTION TYPE: ICD-10-CM

## 2021-09-30 PROCEDURE — 99999 PR PBB SHADOW E&M-EST. PATIENT-LVL III: CPT | Mod: PBBFAC,,, | Performed by: INTERNAL MEDICINE

## 2021-09-30 PROCEDURE — 3078F PR MOST RECENT DIASTOLIC BLOOD PRESSURE < 80 MM HG: ICD-10-PCS | Mod: CPTII,S$GLB,, | Performed by: INTERNAL MEDICINE

## 2021-09-30 PROCEDURE — 99999 PR PBB SHADOW E&M-EST. PATIENT-LVL III: ICD-10-PCS | Mod: PBBFAC,,, | Performed by: INTERNAL MEDICINE

## 2021-09-30 PROCEDURE — 1101F PR PT FALLS ASSESS DOC 0-1 FALLS W/OUT INJ PAST YR: ICD-10-PCS | Mod: CPTII,S$GLB,, | Performed by: INTERNAL MEDICINE

## 2021-09-30 PROCEDURE — 1126F PR PAIN SEVERITY QUANTIFIED, NO PAIN PRESENT: ICD-10-PCS | Mod: CPTII,S$GLB,, | Performed by: INTERNAL MEDICINE

## 2021-09-30 PROCEDURE — 1101F PT FALLS ASSESS-DOCD LE1/YR: CPT | Mod: CPTII,S$GLB,, | Performed by: INTERNAL MEDICINE

## 2021-09-30 PROCEDURE — 1160F PR REVIEW ALL MEDS BY PRESCRIBER/CLIN PHARMACIST DOCUMENTED: ICD-10-PCS | Mod: CPTII,S$GLB,, | Performed by: INTERNAL MEDICINE

## 2021-09-30 PROCEDURE — 3008F PR BODY MASS INDEX (BMI) DOCUMENTED: ICD-10-PCS | Mod: CPTII,S$GLB,, | Performed by: INTERNAL MEDICINE

## 2021-09-30 PROCEDURE — 3008F BODY MASS INDEX DOCD: CPT | Mod: CPTII,S$GLB,, | Performed by: INTERNAL MEDICINE

## 2021-09-30 PROCEDURE — 3075F SYST BP GE 130 - 139MM HG: CPT | Mod: CPTII,S$GLB,, | Performed by: INTERNAL MEDICINE

## 2021-09-30 PROCEDURE — 1126F AMNT PAIN NOTED NONE PRSNT: CPT | Mod: CPTII,S$GLB,, | Performed by: INTERNAL MEDICINE

## 2021-09-30 PROCEDURE — 3288F PR FALLS RISK ASSESSMENT DOCUMENTED: ICD-10-PCS | Mod: CPTII,S$GLB,, | Performed by: INTERNAL MEDICINE

## 2021-09-30 PROCEDURE — 1160F RVW MEDS BY RX/DR IN RCRD: CPT | Mod: CPTII,S$GLB,, | Performed by: INTERNAL MEDICINE

## 2021-09-30 PROCEDURE — 4010F PR ACE/ARB THEARPY RXD/TAKEN: ICD-10-PCS | Mod: CPTII,S$GLB,, | Performed by: INTERNAL MEDICINE

## 2021-09-30 PROCEDURE — 3075F PR MOST RECENT SYSTOLIC BLOOD PRESS GE 130-139MM HG: ICD-10-PCS | Mod: CPTII,S$GLB,, | Performed by: INTERNAL MEDICINE

## 2021-09-30 PROCEDURE — 1159F PR MEDICATION LIST DOCUMENTED IN MEDICAL RECORD: ICD-10-PCS | Mod: CPTII,S$GLB,, | Performed by: INTERNAL MEDICINE

## 2021-09-30 PROCEDURE — 99214 OFFICE O/P EST MOD 30 MIN: CPT | Mod: S$GLB,,, | Performed by: INTERNAL MEDICINE

## 2021-09-30 PROCEDURE — 99214 PR OFFICE/OUTPT VISIT, EST, LEVL IV, 30-39 MIN: ICD-10-PCS | Mod: S$GLB,,, | Performed by: INTERNAL MEDICINE

## 2021-09-30 PROCEDURE — 3078F DIAST BP <80 MM HG: CPT | Mod: CPTII,S$GLB,, | Performed by: INTERNAL MEDICINE

## 2021-09-30 PROCEDURE — 1159F MED LIST DOCD IN RCRD: CPT | Mod: CPTII,S$GLB,, | Performed by: INTERNAL MEDICINE

## 2021-09-30 PROCEDURE — 4010F ACE/ARB THERAPY RXD/TAKEN: CPT | Mod: CPTII,S$GLB,, | Performed by: INTERNAL MEDICINE

## 2021-09-30 PROCEDURE — 3288F FALL RISK ASSESSMENT DOCD: CPT | Mod: CPTII,S$GLB,, | Performed by: INTERNAL MEDICINE

## 2021-10-08 ENCOUNTER — PATIENT OUTREACH (OUTPATIENT)
Dept: ADMINISTRATIVE | Facility: OTHER | Age: 71
End: 2021-10-08

## 2021-10-11 ENCOUNTER — TELEPHONE (OUTPATIENT)
Dept: SPORTS MEDICINE | Facility: CLINIC | Age: 71
End: 2021-10-11

## 2021-10-11 ENCOUNTER — OFFICE VISIT (OUTPATIENT)
Dept: SPORTS MEDICINE | Facility: CLINIC | Age: 71
End: 2021-10-11
Payer: COMMERCIAL

## 2021-10-11 ENCOUNTER — HOSPITAL ENCOUNTER (OUTPATIENT)
Dept: RADIOLOGY | Facility: HOSPITAL | Age: 71
Discharge: HOME | End: 2021-10-11
Attending: ORTHOPAEDIC SURGERY
Payer: COMMERCIAL

## 2021-10-11 VITALS
WEIGHT: 164.25 LBS | HEART RATE: 92 BPM | HEIGHT: 69 IN | BODY MASS INDEX: 24.33 KG/M2 | SYSTOLIC BLOOD PRESSURE: 178 MMHG | DIASTOLIC BLOOD PRESSURE: 80 MMHG

## 2021-10-11 DIAGNOSIS — M19.011 OSTEOARTHRITIS OF RIGHT ACROMIOCLAVICULAR JOINT: Primary | ICD-10-CM

## 2021-10-11 DIAGNOSIS — G89.29 CHRONIC RIGHT SHOULDER PAIN: Primary | ICD-10-CM

## 2021-10-11 DIAGNOSIS — G89.29 CHRONIC RIGHT SHOULDER PAIN: ICD-10-CM

## 2021-10-11 DIAGNOSIS — M25.511 CHRONIC RIGHT SHOULDER PAIN: Primary | ICD-10-CM

## 2021-10-11 DIAGNOSIS — M25.511 CHRONIC RIGHT SHOULDER PAIN: ICD-10-CM

## 2021-10-11 PROCEDURE — 1159F MED LIST DOCD IN RCRD: CPT | Mod: CPTII,S$GLB,, | Performed by: ORTHOPAEDIC SURGERY

## 2021-10-11 PROCEDURE — 3079F DIAST BP 80-89 MM HG: CPT | Mod: CPTII,S$GLB,, | Performed by: ORTHOPAEDIC SURGERY

## 2021-10-11 PROCEDURE — 99203 OFFICE O/P NEW LOW 30 MIN: CPT | Mod: S$GLB,,, | Performed by: ORTHOPAEDIC SURGERY

## 2021-10-11 PROCEDURE — 1159F PR MEDICATION LIST DOCUMENTED IN MEDICAL RECORD: ICD-10-PCS | Mod: CPTII,S$GLB,, | Performed by: ORTHOPAEDIC SURGERY

## 2021-10-11 PROCEDURE — 3288F PR FALLS RISK ASSESSMENT DOCUMENTED: ICD-10-PCS | Mod: CPTII,S$GLB,, | Performed by: ORTHOPAEDIC SURGERY

## 2021-10-11 PROCEDURE — 1126F PR PAIN SEVERITY QUANTIFIED, NO PAIN PRESENT: ICD-10-PCS | Mod: CPTII,S$GLB,, | Performed by: ORTHOPAEDIC SURGERY

## 2021-10-11 PROCEDURE — 4010F PR ACE/ARB THEARPY RXD/TAKEN: ICD-10-PCS | Mod: CPTII,S$GLB,, | Performed by: ORTHOPAEDIC SURGERY

## 2021-10-11 PROCEDURE — 3008F BODY MASS INDEX DOCD: CPT | Mod: CPTII,S$GLB,, | Performed by: ORTHOPAEDIC SURGERY

## 2021-10-11 PROCEDURE — 3288F FALL RISK ASSESSMENT DOCD: CPT | Mod: CPTII,S$GLB,, | Performed by: ORTHOPAEDIC SURGERY

## 2021-10-11 PROCEDURE — 73030 XR SHOULDER COMPLETE 2 OR MORE VIEWS RIGHT: ICD-10-PCS | Mod: 26,RT,, | Performed by: RADIOLOGY

## 2021-10-11 PROCEDURE — 73030 X-RAY EXAM OF SHOULDER: CPT | Mod: TC,PN,RT

## 2021-10-11 PROCEDURE — 99999 PR PBB SHADOW E&M-EST. PATIENT-LVL III: ICD-10-PCS | Mod: PBBFAC,,, | Performed by: ORTHOPAEDIC SURGERY

## 2021-10-11 PROCEDURE — 4010F ACE/ARB THERAPY RXD/TAKEN: CPT | Mod: CPTII,S$GLB,, | Performed by: ORTHOPAEDIC SURGERY

## 2021-10-11 PROCEDURE — 1126F AMNT PAIN NOTED NONE PRSNT: CPT | Mod: CPTII,S$GLB,, | Performed by: ORTHOPAEDIC SURGERY

## 2021-10-11 PROCEDURE — 73030 X-RAY EXAM OF SHOULDER: CPT | Mod: 26,RT,, | Performed by: RADIOLOGY

## 2021-10-11 PROCEDURE — 3008F PR BODY MASS INDEX (BMI) DOCUMENTED: ICD-10-PCS | Mod: CPTII,S$GLB,, | Performed by: ORTHOPAEDIC SURGERY

## 2021-10-11 PROCEDURE — 99999 PR PBB SHADOW E&M-EST. PATIENT-LVL III: CPT | Mod: PBBFAC,,, | Performed by: ORTHOPAEDIC SURGERY

## 2021-10-11 PROCEDURE — 3077F SYST BP >= 140 MM HG: CPT | Mod: CPTII,S$GLB,, | Performed by: ORTHOPAEDIC SURGERY

## 2021-10-11 PROCEDURE — 99203 PR OFFICE/OUTPT VISIT, NEW, LEVL III, 30-44 MIN: ICD-10-PCS | Mod: S$GLB,,, | Performed by: ORTHOPAEDIC SURGERY

## 2021-10-11 PROCEDURE — 1101F PT FALLS ASSESS-DOCD LE1/YR: CPT | Mod: CPTII,S$GLB,, | Performed by: ORTHOPAEDIC SURGERY

## 2021-10-11 PROCEDURE — 3077F PR MOST RECENT SYSTOLIC BLOOD PRESSURE >= 140 MM HG: ICD-10-PCS | Mod: CPTII,S$GLB,, | Performed by: ORTHOPAEDIC SURGERY

## 2021-10-11 PROCEDURE — 3079F PR MOST RECENT DIASTOLIC BLOOD PRESSURE 80-89 MM HG: ICD-10-PCS | Mod: CPTII,S$GLB,, | Performed by: ORTHOPAEDIC SURGERY

## 2021-10-11 PROCEDURE — 1101F PR PT FALLS ASSESS DOC 0-1 FALLS W/OUT INJ PAST YR: ICD-10-PCS | Mod: CPTII,S$GLB,, | Performed by: ORTHOPAEDIC SURGERY

## 2021-12-15 DIAGNOSIS — I10 BENIGN ESSENTIAL HYPERTENSION: ICD-10-CM

## 2021-12-15 RX ORDER — VALSARTAN 160 MG/1
160 TABLET ORAL DAILY
Qty: 90 TABLET | Refills: 3 | Status: SHIPPED | OUTPATIENT
Start: 2021-12-15 | End: 2022-03-31

## 2021-12-20 ENCOUNTER — PATIENT MESSAGE (OUTPATIENT)
Dept: FAMILY MEDICINE | Facility: CLINIC | Age: 71
End: 2021-12-20
Payer: COMMERCIAL

## 2022-02-21 ENCOUNTER — PATIENT OUTREACH (OUTPATIENT)
Dept: ADMINISTRATIVE | Facility: OTHER | Age: 72
End: 2022-02-21
Payer: MEDICARE

## 2022-02-22 ENCOUNTER — OFFICE VISIT (OUTPATIENT)
Dept: SPORTS MEDICINE | Facility: CLINIC | Age: 72
End: 2022-02-22
Payer: MEDICARE

## 2022-02-22 ENCOUNTER — HOSPITAL ENCOUNTER (OUTPATIENT)
Dept: RADIOLOGY | Facility: HOSPITAL | Age: 72
Discharge: HOME | End: 2022-02-22
Attending: ORTHOPAEDIC SURGERY
Payer: MEDICARE

## 2022-02-22 VITALS
BODY MASS INDEX: 23.85 KG/M2 | DIASTOLIC BLOOD PRESSURE: 82 MMHG | HEIGHT: 69 IN | SYSTOLIC BLOOD PRESSURE: 178 MMHG | WEIGHT: 161 LBS | HEART RATE: 100 BPM

## 2022-02-22 DIAGNOSIS — M25.561 CHRONIC PAIN OF RIGHT KNEE: ICD-10-CM

## 2022-02-22 DIAGNOSIS — G89.29 CHRONIC PAIN OF RIGHT KNEE: ICD-10-CM

## 2022-02-22 DIAGNOSIS — M17.11 PRIMARY OSTEOARTHRITIS OF RIGHT KNEE: Primary | ICD-10-CM

## 2022-02-22 DIAGNOSIS — M25.561 RIGHT KNEE PAIN, UNSPECIFIED CHRONICITY: ICD-10-CM

## 2022-02-22 PROCEDURE — 3079F DIAST BP 80-89 MM HG: CPT | Mod: CPTII,S$GLB,, | Performed by: ORTHOPAEDIC SURGERY

## 2022-02-22 PROCEDURE — 73562 X-RAY EXAM OF KNEE 3: CPT | Mod: 26,LT,, | Performed by: RADIOLOGY

## 2022-02-22 PROCEDURE — 73564 XR KNEE ORTHO RIGHT WITH FLEXION: ICD-10-PCS | Mod: 26,RT,, | Performed by: RADIOLOGY

## 2022-02-22 PROCEDURE — 3008F PR BODY MASS INDEX (BMI) DOCUMENTED: ICD-10-PCS | Mod: CPTII,S$GLB,, | Performed by: ORTHOPAEDIC SURGERY

## 2022-02-22 PROCEDURE — 1125F AMNT PAIN NOTED PAIN PRSNT: CPT | Mod: CPTII,S$GLB,, | Performed by: ORTHOPAEDIC SURGERY

## 2022-02-22 PROCEDURE — 1159F MED LIST DOCD IN RCRD: CPT | Mod: CPTII,S$GLB,, | Performed by: ORTHOPAEDIC SURGERY

## 2022-02-22 PROCEDURE — 1125F PR PAIN SEVERITY QUANTIFIED, PAIN PRESENT: ICD-10-PCS | Mod: CPTII,S$GLB,, | Performed by: ORTHOPAEDIC SURGERY

## 2022-02-22 PROCEDURE — 3077F PR MOST RECENT SYSTOLIC BLOOD PRESSURE >= 140 MM HG: ICD-10-PCS | Mod: CPTII,S$GLB,, | Performed by: ORTHOPAEDIC SURGERY

## 2022-02-22 PROCEDURE — 99214 PR OFFICE/OUTPT VISIT, EST, LEVL IV, 30-39 MIN: ICD-10-PCS | Mod: S$GLB,,, | Performed by: ORTHOPAEDIC SURGERY

## 2022-02-22 PROCEDURE — 73562 XR KNEE ORTHO RIGHT WITH FLEXION: ICD-10-PCS | Mod: 26,LT,, | Performed by: RADIOLOGY

## 2022-02-22 PROCEDURE — 3008F BODY MASS INDEX DOCD: CPT | Mod: CPTII,S$GLB,, | Performed by: ORTHOPAEDIC SURGERY

## 2022-02-22 PROCEDURE — 99214 OFFICE O/P EST MOD 30 MIN: CPT | Mod: S$GLB,,, | Performed by: ORTHOPAEDIC SURGERY

## 2022-02-22 PROCEDURE — 3079F PR MOST RECENT DIASTOLIC BLOOD PRESSURE 80-89 MM HG: ICD-10-PCS | Mod: CPTII,S$GLB,, | Performed by: ORTHOPAEDIC SURGERY

## 2022-02-22 PROCEDURE — 1159F PR MEDICATION LIST DOCUMENTED IN MEDICAL RECORD: ICD-10-PCS | Mod: CPTII,S$GLB,, | Performed by: ORTHOPAEDIC SURGERY

## 2022-02-22 PROCEDURE — 99999 PR PBB SHADOW E&M-EST. PATIENT-LVL III: ICD-10-PCS | Mod: PBBFAC,,, | Performed by: ORTHOPAEDIC SURGERY

## 2022-02-22 PROCEDURE — 99999 PR PBB SHADOW E&M-EST. PATIENT-LVL III: CPT | Mod: PBBFAC,,, | Performed by: ORTHOPAEDIC SURGERY

## 2022-02-22 PROCEDURE — 73562 X-RAY EXAM OF KNEE 3: CPT | Mod: TC,LT

## 2022-02-22 PROCEDURE — 3077F SYST BP >= 140 MM HG: CPT | Mod: CPTII,S$GLB,, | Performed by: ORTHOPAEDIC SURGERY

## 2022-02-22 PROCEDURE — 73564 X-RAY EXAM KNEE 4 OR MORE: CPT | Mod: 26,RT,, | Performed by: RADIOLOGY

## 2022-02-22 RX ORDER — CELECOXIB 200 MG/1
200 CAPSULE ORAL DAILY
Qty: 60 CAPSULE | Refills: 2 | Status: SHIPPED | OUTPATIENT
Start: 2022-02-22 | End: 2023-10-12

## 2022-02-22 NOTE — PROGRESS NOTES
CC: Right knee pain    71 y.o. Male who is known patient to me. Previously seen for the right shoulder. Here for evaluation of the right knee today. Complaint is right knee pain x 5 days. [place] the driving [place] and the following morning when he got up he noticed that he had soreness in his knee. Pain localizes medial joint line.  Denies swelling or effusions. No prominent mechanical symptoms. Denies instability. Worse with kneeling down. This pain is intermittent in nature. He felt it when he got out of his truck yesterday. Better with rest. Treatment thus far has included rest and activity modifications. Here today to discuss diagnosis and treatment options.      Pain Score:   1    REVIEW OF SYSTEMS:   Constitution: Negative. Negative for chills, fever and night sweats.    Hematologic/Lymphatic: Negative for bleeding problem. Does not bruise/bleed easily.   Skin: Negative for dry skin, itching and rash.   Musculoskeletal: Negative for falls. Positive for right knee pain and muscle weakness.     All other review of symptoms were reviewed and found to be noncontributory.     PAST MEDICAL HISTORY:   History reviewed. No pertinent past medical history.    PAST SURGICAL HISTORY:   History reviewed. No pertinent surgical history.    FAMILY HISTORY:   Family History   Problem Relation Age of Onset    Hypertension Mother     Cancer Mother     Hypertension Sister     Cancer Sister        SOCIAL HISTORY:   Social History     Socioeconomic History    Marital status:    Tobacco Use    Smoking status: Never Smoker    Smokeless tobacco: Never Used   Substance and Sexual Activity    Alcohol use: No    Drug use: Yes    Sexual activity: Yes     Partners: Female       MEDICATIONS:     Current Outpatient Medications:     DIOVAN 160 mg tablet, Take 1 tablet (160 mg total) by mouth once daily., Disp: 90 tablet, Rfl: 3    multivitamin capsule, Take 1 capsule by mouth once daily., Disp: , Rfl:     zolpidem  "(AMBIEN) 5 MG Tab, Take 1 tablet (5 mg total) by mouth nightly as needed., Disp: 30 tablet, Rfl: 0    tadalafiL (CIALIS) 20 MG Tab, Take 1 tablet (20 mg total) by mouth once daily., Disp: 6 tablet, Rfl: 1    ALLERGIES:   Review of patient's allergies indicates:  No Known Allergies     PHYSICAL EXAMINATION:  BP (!) 178/82   Pulse 100   Ht 5' 9" (1.753 m)   Wt 73 kg (161 lb)   BMI 23.78 kg/m²   General: Well-developed well-nourished 71 y.o. malein no acute distress   Cardiovascular: Regular rhythm by palpation of distal pulse, normal color and temperature, no concerning varicosities on symptomatic side   Lungs: No labored breathing or wheezing appreciated   Neuro: Alert and oriented ×3   Psychiatric: well oriented to person, place and time, demonstrates normal mood and affect   Skin: No rashes, lesions or ulcers, normal temperature, turgor, and texture on involved extremity    Ortho/SPM Exam  Examination of the right knee demonstrates intact extensor mechanism. No effusion or prepatellar swelling. Positive patellar crepitus. Negative patellar grind. Central patellar tracking. No patellar apprehension. Normal patellar mobility. Full extension. Flexion to 130.  Mild anterior medial based pain with forced flexion. Mild tenderness along the medial joint line. Negative Antwan's. Negative Lachman. Stable to varus/valgus stress testing at 0 and 30 deg. Ligamentously stable.    IMAGING:  X-rays including standing, weight bearing AP and flexion bilateral knees, RIGHT knee lateral and sunrise views ordered and images reviewed by me show:    Mild degenerative changes.  Well-maintained joint spaces.    ASSESSMENT:      ICD-10-CM ICD-9-CM   1. Primary osteoarthritis of right knee  M17.11 715.16   2. Chronic pain of right knee  M25.561 719.46    G89.29 338.29     PLAN:     -Findings and treatment options were discussed with the patient.  He does have some underlying chondromalacia and degenerative changes.  Possible " associated degenerative type medial meniscus tear as well.  No prominent mechanical symptoms.  Initial conservative treatment recommended.  -Prescribed Celebrex.  -Discussed home exercises for strengthening and low impact activities.  -Consider CSI if pain gets worse.  -RTC PRN.  -All questions answered

## 2022-03-24 ENCOUNTER — PATIENT MESSAGE (OUTPATIENT)
Dept: INTERNAL MEDICINE | Facility: CLINIC | Age: 72
End: 2022-03-24
Payer: MEDICARE

## 2022-03-25 ENCOUNTER — TELEPHONE (OUTPATIENT)
Dept: INTERNAL MEDICINE | Facility: CLINIC | Age: 72
End: 2022-03-25
Payer: MEDICARE

## 2022-03-25 NOTE — TELEPHONE ENCOUNTER
Called [facility] and informed of PA response of medication not need a pa it was covered by patient plan. Pharmacy ran the medication and states it looks like it will go through. Tried call patient and did not answer

## 2022-03-25 NOTE — TELEPHONE ENCOUNTER
----- Message from Jose C Torres sent at 3/25/2022 11:31 AM CDT -----  .Type:  Pharmacy Calling to Clarify an RX    Name of Caller: [facility]  Pharmacy Name:Pharmacy  Prescription Name:DIOVAN 160 mg tablet  What do they need to clarify?:  Best Call Back Number:[phone]  Fax  #[phone]  Additional Information: Needs a prior authorization  Ref [id number]

## 2022-03-25 NOTE — TELEPHONE ENCOUNTER
----- Message from Dolores Benedict sent at 3/25/2022  4:20 PM CDT -----  Type:  Pharmacy Calling to Clarify an RX      Pharmacy Name:[facility]   Prescription Name: DIOVAN 160 mg tablet  What do they need to clarify?: pt wants to lower copay cost for medication and pharmacy is needing a Tier Exception   Best Call Back Number: [phone] prior authorization number  PA# [id number]  Additional Information:  and would like to know if patient ever had Dystolic

## 2022-03-31 ENCOUNTER — OFFICE VISIT (OUTPATIENT)
Dept: INTERNAL MEDICINE | Facility: CLINIC | Age: 72
End: 2022-03-31
Payer: MEDICARE

## 2022-03-31 VITALS
BODY MASS INDEX: 24.87 KG/M2 | RESPIRATION RATE: 18 BRPM | SYSTOLIC BLOOD PRESSURE: 134 MMHG | TEMPERATURE: 98 F | DIASTOLIC BLOOD PRESSURE: 82 MMHG | HEIGHT: 69 IN | HEART RATE: 77 BPM | WEIGHT: 167.88 LBS | OXYGEN SATURATION: 98 %

## 2022-03-31 DIAGNOSIS — N52.9 ERECTILE DYSFUNCTION, UNSPECIFIED ERECTILE DYSFUNCTION TYPE: ICD-10-CM

## 2022-03-31 DIAGNOSIS — Z12.5 PROSTATE CANCER SCREENING: ICD-10-CM

## 2022-03-31 DIAGNOSIS — I10 BENIGN ESSENTIAL HYPERTENSION: Primary | ICD-10-CM

## 2022-03-31 DIAGNOSIS — F51.01 PRIMARY INSOMNIA: ICD-10-CM

## 2022-03-31 DIAGNOSIS — E78.5 BORDERLINE HYPERLIPIDEMIA: ICD-10-CM

## 2022-03-31 PROCEDURE — 1160F PR REVIEW ALL MEDS BY PRESCRIBER/CLIN PHARMACIST DOCUMENTED: ICD-10-PCS | Mod: CPTII,S$GLB,, | Performed by: INTERNAL MEDICINE

## 2022-03-31 PROCEDURE — 3075F SYST BP GE 130 - 139MM HG: CPT | Mod: CPTII,S$GLB,, | Performed by: INTERNAL MEDICINE

## 2022-03-31 PROCEDURE — 4010F ACE/ARB THERAPY RXD/TAKEN: CPT | Mod: CPTII,S$GLB,, | Performed by: INTERNAL MEDICINE

## 2022-03-31 PROCEDURE — 3008F PR BODY MASS INDEX (BMI) DOCUMENTED: ICD-10-PCS | Mod: CPTII,S$GLB,, | Performed by: INTERNAL MEDICINE

## 2022-03-31 PROCEDURE — 1159F MED LIST DOCD IN RCRD: CPT | Mod: CPTII,S$GLB,, | Performed by: INTERNAL MEDICINE

## 2022-03-31 PROCEDURE — 3008F BODY MASS INDEX DOCD: CPT | Mod: CPTII,S$GLB,, | Performed by: INTERNAL MEDICINE

## 2022-03-31 PROCEDURE — 1126F PR PAIN SEVERITY QUANTIFIED, NO PAIN PRESENT: ICD-10-PCS | Mod: CPTII,S$GLB,, | Performed by: INTERNAL MEDICINE

## 2022-03-31 PROCEDURE — 3288F PR FALLS RISK ASSESSMENT DOCUMENTED: ICD-10-PCS | Mod: CPTII,S$GLB,, | Performed by: INTERNAL MEDICINE

## 2022-03-31 PROCEDURE — 1159F PR MEDICATION LIST DOCUMENTED IN MEDICAL RECORD: ICD-10-PCS | Mod: CPTII,S$GLB,, | Performed by: INTERNAL MEDICINE

## 2022-03-31 PROCEDURE — 3079F DIAST BP 80-89 MM HG: CPT | Mod: CPTII,S$GLB,, | Performed by: INTERNAL MEDICINE

## 2022-03-31 PROCEDURE — 1101F PR PT FALLS ASSESS DOC 0-1 FALLS W/OUT INJ PAST YR: ICD-10-PCS | Mod: CPTII,S$GLB,, | Performed by: INTERNAL MEDICINE

## 2022-03-31 PROCEDURE — 99214 PR OFFICE/OUTPT VISIT, EST, LEVL IV, 30-39 MIN: ICD-10-PCS | Mod: S$GLB,,, | Performed by: INTERNAL MEDICINE

## 2022-03-31 PROCEDURE — 99999 PR PBB SHADOW E&M-EST. PATIENT-LVL III: CPT | Mod: PBBFAC,,, | Performed by: INTERNAL MEDICINE

## 2022-03-31 PROCEDURE — 3075F PR MOST RECENT SYSTOLIC BLOOD PRESS GE 130-139MM HG: ICD-10-PCS | Mod: CPTII,S$GLB,, | Performed by: INTERNAL MEDICINE

## 2022-03-31 PROCEDURE — 4010F PR ACE/ARB THEARPY RXD/TAKEN: ICD-10-PCS | Mod: CPTII,S$GLB,, | Performed by: INTERNAL MEDICINE

## 2022-03-31 PROCEDURE — 99999 PR PBB SHADOW E&M-EST. PATIENT-LVL III: ICD-10-PCS | Mod: PBBFAC,,, | Performed by: INTERNAL MEDICINE

## 2022-03-31 PROCEDURE — 1101F PT FALLS ASSESS-DOCD LE1/YR: CPT | Mod: CPTII,S$GLB,, | Performed by: INTERNAL MEDICINE

## 2022-03-31 PROCEDURE — 1160F RVW MEDS BY RX/DR IN RCRD: CPT | Mod: CPTII,S$GLB,, | Performed by: INTERNAL MEDICINE

## 2022-03-31 PROCEDURE — 1126F AMNT PAIN NOTED NONE PRSNT: CPT | Mod: CPTII,S$GLB,, | Performed by: INTERNAL MEDICINE

## 2022-03-31 PROCEDURE — 3079F PR MOST RECENT DIASTOLIC BLOOD PRESSURE 80-89 MM HG: ICD-10-PCS | Mod: CPTII,S$GLB,, | Performed by: INTERNAL MEDICINE

## 2022-03-31 PROCEDURE — 3288F FALL RISK ASSESSMENT DOCD: CPT | Mod: CPTII,S$GLB,, | Performed by: INTERNAL MEDICINE

## 2022-03-31 PROCEDURE — 99214 OFFICE O/P EST MOD 30 MIN: CPT | Mod: S$GLB,,, | Performed by: INTERNAL MEDICINE

## 2022-03-31 RX ORDER — VALSARTAN 160 MG/1
160 TABLET ORAL DAILY
Qty: 90 TABLET | Refills: 3 | Status: SHIPPED | OUTPATIENT
Start: 2022-03-31 | End: 2023-04-26 | Stop reason: SDUPTHER

## 2022-03-31 NOTE — PROGRESS NOTES
[facility] Note    Chief Complaint      Chief Complaint   Patient presents with    Follow-up     6M       History of Present Illness      Gerald Wilkinson is a 71 y.o. male who presents today for   Chief Complaint   Patient presents with    Follow-up     6M   .  Patient comes to appointment here for  6m checkup for chronic issues as below . He needs labs done for next month . He follows with dr shahid for prostate . He ahs been boosted for covid . He is active with exercise continues to play golf . Is having some issue with oa but is doing ok . He had visit with dr shipman for some shoulder issues .     HPI    No problem-specific Assessment & Plan notes found for this encounter.       Problem List Items Addressed This Visit        Cardiac/Vascular    Benign essential hypertension - Primary    Overview     bp well controlled . Need to try valsartan again            Borderline hyperlipidemia    Overview     Stable on diet only check lipid               Renal/    Erectile dysfunction    Overview     Cont cialis           Prostate cancer screening    Overview     Check psa               Other    Primary insomnia    Overview     ambien prn continues to work well                   Past Medical History:  History reviewed. No pertinent past medical history.    Past Surgical History:  History reviewed. No pertinent surgical history.    Family History:  family history includes Cancer in his mother and sister; Hypertension in his mother and sister.     Social History:  Social History     Socioeconomic History    Marital status:    Tobacco Use    Smoking status: Never Smoker    Smokeless tobacco: Never Used   Substance and Sexual Activity    Alcohol use: No    Drug use: Yes    Sexual activity: Yes     Partners: Female       Review of Systems:   Review of Systems   Constitutional: Negative for fever and weight loss.   HENT: Negative for congestion, hearing loss and sore throat.    Eyes:  "Negative for blurred vision.   Respiratory: Negative for cough and shortness of breath.    Cardiovascular: Negative for chest pain, palpitations, claudication and leg swelling.   Gastrointestinal: Negative for abdominal pain, constipation, diarrhea and heartburn.   Genitourinary: Negative for dysuria.   Musculoskeletal: Positive for joint pain. Negative for back pain and myalgias.   Skin: Negative for rash.   Neurological: Negative for focal weakness and headaches.   Psychiatric/Behavioral: Negative for depression and suicidal ideas. The patient is not nervous/anxious.         Medications:  Outpatient Encounter Medications as of 3/31/2022   Medication Sig Dispense Refill    multivitamin capsule Take 1 capsule by mouth once daily.      tadalafiL (CIALIS) 20 MG Tab Take 1 tablet (20 mg total) by mouth once daily. 6 tablet 1    zolpidem (AMBIEN) 5 MG Tab Take 1 tablet (5 mg total) by mouth nightly as needed. 30 tablet 0    [DISCONTINUED] DIOVAN 160 mg tablet Take 1 tablet (160 mg total) by mouth once daily. 90 tablet 3    celecoxib (CELEBREX) 200 MG capsule Take 1 capsule (200 mg total) by mouth once daily. (Patient not taking: Reported on 3/31/2022) 60 capsule 2    valsartan (DIOVAN) 160 MG tablet Take 1 tablet (160 mg total) by mouth once daily. 90 tablet 3     No facility-administered encounter medications on file as of 3/31/2022.       Allergies:  Review of patient's allergies indicates:  No Known Allergies      Physical Exam      Vitals:    03/31/22 1355   BP: 134/82   Pulse:    Resp:    Temp:         Vital Signs  Temp: 98 °F (36.7 °C)  Temp src: Oral  Pulse: 77  Resp: 18  SpO2: 98 %  BP: 134/82  BP Location: Left arm  Patient Position: Sitting  Pain Score: 0-No pain  Height and Weight  Height: 5' 9" (175.3 cm)  Weight: 76.1 kg (167 lb 14.1 oz)  BSA (Calculated - sq m): 1.93 sq meters  BMI (Calculated): 24.8  Weight in (lb) to have BMI = 25: 168.9]     Body mass index is 24.79 kg/m².    Physical " Exam  Constitutional:       Appearance: He is well-developed.   HENT:      Head: Normocephalic.   Eyes:      Pupils: Pupils are equal, round, and reactive to light.   Neck:      Thyroid: No thyromegaly.   Cardiovascular:      Rate and Rhythm: Normal rate and regular rhythm.      Heart sounds: No murmur heard.    No friction rub. No gallop.   Pulmonary:      Effort: Pulmonary effort is normal.      Breath sounds: Normal breath sounds.   Abdominal:      General: Bowel sounds are normal.      Palpations: Abdomen is soft.   Musculoskeletal:         General: Normal range of motion.      Cervical back: Normal range of motion.   Skin:     General: Skin is warm and dry.   Neurological:      Mental Status: He is alert and oriented to person, place, and time.      Sensory: No sensory deficit.   Psychiatric:         Behavior: Behavior normal.          Laboratory:  CBC:  No results for input(s): WBC, RBC, HGB, HCT, PLT, MCV, MCH, MCHC in the last 2160 hours.  CMP:  No results for input(s): GLU, CALCIUM, ALBUMIN, PROT, NA, K, CO2, CL, BUN, ALKPHOS, ALT, AST, BILITOT in the last 2160 hours.    Invalid input(s): CREATININ  URINALYSIS:  No results for input(s): COLORU, CLARITYU, SPECGRAV, PHUR, PROTEINUA, GLUCOSEU, BILIRUBINCON, BLOODU, WBCU, RBCU, BACTERIA, MUCUS, NITRITE, LEUKOCYTESUR, UROBILINOGEN, HYALINECASTS in the last 2160 hours.   LIPIDS:  No results for input(s): TSH, HDL, CHOL, TRIG, LDLCALC, CHOLHDL, NONHDLCHOL, TOTALCHOLEST in the last 2160 hours.  TSH:  No results for input(s): TSH in the last 2160 hours.  A1C:  No results for input(s): HGBA1C in the last 2160 hours.    Radiology:        Assessment:     Gerald Wilkinson is a 71 y.o.male with:    Benign essential hypertension  -     CBC Auto Differential; Future; Expected date: 03/31/2022  -     valsartan (DIOVAN) 160 MG tablet; Take 1 tablet (160 mg total) by mouth once daily.  Dispense: 90 tablet; Refill: 3    Prostate cancer screening  -     PSA, Screening; Future;  Expected date: 03/31/2022    Primary insomnia    Erectile dysfunction, unspecified erectile dysfunction type    Borderline hyperlipidemia  -     Comprehensive Metabolic Panel; Future; Expected date: 03/31/2022  -     Lipid Panel; Future; Expected date: 03/31/2022                Plan:     Problem List Items Addressed This Visit        Cardiac/Vascular    Benign essential hypertension - Primary    Overview     bp well controlled . Need to try valsartan again            Borderline hyperlipidemia    Overview     Stable on diet only check lipid               Renal/    Erectile dysfunction    Overview     Cont cialis           Prostate cancer screening    Overview     Check psa               Other    Primary insomnia    Overview     ambien prn continues to work well                  As above, continue current medications and maintain follow up with specialists.  Return to clinic in 6  months.      Frederick W Dantagnan [facility] [place]

## 2022-04-21 ENCOUNTER — LAB VISIT (OUTPATIENT)
Dept: LAB | Facility: HOSPITAL | Age: 72
End: 2022-04-21
Attending: INTERNAL MEDICINE
Payer: MEDICARE

## 2022-04-21 DIAGNOSIS — E78.5 BORDERLINE HYPERLIPIDEMIA: ICD-10-CM

## 2022-04-21 DIAGNOSIS — Z12.5 PROSTATE CANCER SCREENING: ICD-10-CM

## 2022-04-21 DIAGNOSIS — I10 BENIGN ESSENTIAL HYPERTENSION: ICD-10-CM

## 2022-04-21 LAB
ALBUMIN SERPL BCP-MCNC: 4.1 G/DL (ref 3.5–5.2)
ALP SERPL-CCNC: 41 U/L (ref 55–135)
ALT SERPL W/O P-5'-P-CCNC: 17 U/L (ref 10–44)
ANION GAP SERPL CALC-SCNC: 11 MMOL/L (ref 8–16)
AST SERPL-CCNC: 23 U/L (ref 10–40)
BASOPHILS # BLD AUTO: 0.03 K/UL (ref 0–0.2)
BASOPHILS NFR BLD: 0.6 % (ref 0–1.9)
BILIRUB SERPL-MCNC: 1.1 MG/DL (ref 0.1–1)
BUN SERPL-MCNC: 21 MG/DL (ref 8–23)
CALCIUM SERPL-MCNC: 9.7 MG/DL (ref 8.7–10.5)
CHLORIDE SERPL-SCNC: 101 MMOL/L (ref 95–110)
CHOLEST SERPL-MCNC: 198 MG/DL (ref 120–199)
CHOLEST/HDLC SERPL: 4.1 {RATIO} (ref 2–5)
CO2 SERPL-SCNC: 25 MMOL/L (ref 23–29)
COMPLEXED PSA SERPL-MCNC: 4.2 NG/ML (ref 0–4)
CREAT SERPL-MCNC: 0.9 MG/DL (ref 0.5–1.4)
DIFFERENTIAL METHOD: ABNORMAL
EOSINOPHIL # BLD AUTO: 0.2 K/UL (ref 0–0.5)
EOSINOPHIL NFR BLD: 3.2 % (ref 0–8)
ERYTHROCYTE [DISTWIDTH] IN BLOOD BY AUTOMATED COUNT: 12.1 % (ref 11.5–14.5)
EST. GFR  (AFRICAN AMERICAN): >60 ML/MIN/1.73 M^2
EST. GFR  (NON AFRICAN AMERICAN): >60 ML/MIN/1.73 M^2
GLUCOSE SERPL-MCNC: 83 MG/DL (ref 70–110)
HCT VFR BLD AUTO: 48.8 % (ref 40–54)
HDLC SERPL-MCNC: 48 MG/DL (ref 40–75)
HDLC SERPL: 24.2 % (ref 20–50)
HGB BLD-MCNC: 15.4 G/DL (ref 14–18)
IMM GRANULOCYTES # BLD AUTO: 0.01 K/UL (ref 0–0.04)
IMM GRANULOCYTES NFR BLD AUTO: 0.2 % (ref 0–0.5)
LDLC SERPL CALC-MCNC: 136.2 MG/DL (ref 63–159)
LYMPHOCYTES # BLD AUTO: 1.3 K/UL (ref 1–4.8)
LYMPHOCYTES NFR BLD: 25.2 % (ref 18–48)
MCH RBC QN AUTO: 28.9 PG (ref 27–31)
MCHC RBC AUTO-ENTMCNC: 31.6 G/DL (ref 32–36)
MCV RBC AUTO: 92 FL (ref 82–98)
MONOCYTES # BLD AUTO: 0.6 K/UL (ref 0.3–1)
MONOCYTES NFR BLD: 12.2 % (ref 4–15)
NEUTROPHILS # BLD AUTO: 2.9 K/UL (ref 1.8–7.7)
NEUTROPHILS NFR BLD: 58.6 % (ref 38–73)
NONHDLC SERPL-MCNC: 150 MG/DL
NRBC BLD-RTO: 0 /100 WBC
PLATELET # BLD AUTO: 246 K/UL (ref 150–450)
PMV BLD AUTO: 10.9 FL (ref 9.2–12.9)
POTASSIUM SERPL-SCNC: 4.2 MMOL/L (ref 3.5–5.1)
PROT SERPL-MCNC: 7.6 G/DL (ref 6–8.4)
RBC # BLD AUTO: 5.33 M/UL (ref 4.6–6.2)
SODIUM SERPL-SCNC: 137 MMOL/L (ref 136–145)
TRIGL SERPL-MCNC: 69 MG/DL (ref 30–150)
WBC # BLD AUTO: 5 K/UL (ref 3.9–12.7)

## 2022-04-21 PROCEDURE — 80053 COMPREHEN METABOLIC PANEL: CPT | Performed by: INTERNAL MEDICINE

## 2022-04-21 PROCEDURE — 85025 COMPLETE CBC W/AUTO DIFF WBC: CPT | Performed by: INTERNAL MEDICINE

## 2022-04-21 PROCEDURE — 80061 LIPID PANEL: CPT | Performed by: INTERNAL MEDICINE

## 2022-04-21 PROCEDURE — 36415 COLL VENOUS BLD VENIPUNCTURE: CPT | Performed by: INTERNAL MEDICINE

## 2022-04-21 PROCEDURE — 84153 ASSAY OF PSA TOTAL: CPT | Performed by: INTERNAL MEDICINE

## 2022-05-12 ENCOUNTER — PATIENT MESSAGE (OUTPATIENT)
Dept: INTERNAL MEDICINE | Facility: CLINIC | Age: 72
End: 2022-05-12
Payer: MEDICARE

## 2022-05-12 DIAGNOSIS — R97.20 ELEVATED PSA: Primary | ICD-10-CM

## 2022-09-29 ENCOUNTER — OFFICE VISIT (OUTPATIENT)
Dept: INTERNAL MEDICINE | Facility: CLINIC | Age: 72
End: 2022-09-29
Payer: MEDICARE

## 2022-09-29 VITALS
HEART RATE: 60 BPM | WEIGHT: 166.25 LBS | SYSTOLIC BLOOD PRESSURE: 134 MMHG | DIASTOLIC BLOOD PRESSURE: 80 MMHG | HEIGHT: 69 IN | OXYGEN SATURATION: 98 % | RESPIRATION RATE: 18 BRPM | BODY MASS INDEX: 24.62 KG/M2 | TEMPERATURE: 99 F

## 2022-09-29 DIAGNOSIS — E78.5 BORDERLINE HYPERLIPIDEMIA: ICD-10-CM

## 2022-09-29 DIAGNOSIS — N52.9 ERECTILE DYSFUNCTION, UNSPECIFIED ERECTILE DYSFUNCTION TYPE: ICD-10-CM

## 2022-09-29 DIAGNOSIS — F51.01 PRIMARY INSOMNIA: ICD-10-CM

## 2022-09-29 DIAGNOSIS — I10 BENIGN ESSENTIAL HYPERTENSION: Primary | ICD-10-CM

## 2022-09-29 PROCEDURE — 3075F SYST BP GE 130 - 139MM HG: CPT | Mod: CPTII,S$GLB,, | Performed by: INTERNAL MEDICINE

## 2022-09-29 PROCEDURE — 3008F PR BODY MASS INDEX (BMI) DOCUMENTED: ICD-10-PCS | Mod: CPTII,S$GLB,, | Performed by: INTERNAL MEDICINE

## 2022-09-29 PROCEDURE — 3288F FALL RISK ASSESSMENT DOCD: CPT | Mod: CPTII,S$GLB,, | Performed by: INTERNAL MEDICINE

## 2022-09-29 PROCEDURE — 3079F DIAST BP 80-89 MM HG: CPT | Mod: CPTII,S$GLB,, | Performed by: INTERNAL MEDICINE

## 2022-09-29 PROCEDURE — 1160F PR REVIEW ALL MEDS BY PRESCRIBER/CLIN PHARMACIST DOCUMENTED: ICD-10-PCS | Mod: CPTII,S$GLB,, | Performed by: INTERNAL MEDICINE

## 2022-09-29 PROCEDURE — 99999 PR PBB SHADOW E&M-EST. PATIENT-LVL III: CPT | Mod: PBBFAC,,, | Performed by: INTERNAL MEDICINE

## 2022-09-29 PROCEDURE — 3075F PR MOST RECENT SYSTOLIC BLOOD PRESS GE 130-139MM HG: ICD-10-PCS | Mod: CPTII,S$GLB,, | Performed by: INTERNAL MEDICINE

## 2022-09-29 PROCEDURE — 4010F ACE/ARB THERAPY RXD/TAKEN: CPT | Mod: CPTII,S$GLB,, | Performed by: INTERNAL MEDICINE

## 2022-09-29 PROCEDURE — 1101F PR PT FALLS ASSESS DOC 0-1 FALLS W/OUT INJ PAST YR: ICD-10-PCS | Mod: CPTII,S$GLB,, | Performed by: INTERNAL MEDICINE

## 2022-09-29 PROCEDURE — 4010F PR ACE/ARB THEARPY RXD/TAKEN: ICD-10-PCS | Mod: CPTII,S$GLB,, | Performed by: INTERNAL MEDICINE

## 2022-09-29 PROCEDURE — 3288F PR FALLS RISK ASSESSMENT DOCUMENTED: ICD-10-PCS | Mod: CPTII,S$GLB,, | Performed by: INTERNAL MEDICINE

## 2022-09-29 PROCEDURE — 1159F PR MEDICATION LIST DOCUMENTED IN MEDICAL RECORD: ICD-10-PCS | Mod: CPTII,S$GLB,, | Performed by: INTERNAL MEDICINE

## 2022-09-29 PROCEDURE — 99214 PR OFFICE/OUTPT VISIT, EST, LEVL IV, 30-39 MIN: ICD-10-PCS | Mod: S$GLB,,, | Performed by: INTERNAL MEDICINE

## 2022-09-29 PROCEDURE — 1101F PT FALLS ASSESS-DOCD LE1/YR: CPT | Mod: CPTII,S$GLB,, | Performed by: INTERNAL MEDICINE

## 2022-09-29 PROCEDURE — 99214 OFFICE O/P EST MOD 30 MIN: CPT | Mod: S$GLB,,, | Performed by: INTERNAL MEDICINE

## 2022-09-29 PROCEDURE — 1159F MED LIST DOCD IN RCRD: CPT | Mod: CPTII,S$GLB,, | Performed by: INTERNAL MEDICINE

## 2022-09-29 PROCEDURE — 1126F PR PAIN SEVERITY QUANTIFIED, NO PAIN PRESENT: ICD-10-PCS | Mod: CPTII,S$GLB,, | Performed by: INTERNAL MEDICINE

## 2022-09-29 PROCEDURE — 1126F AMNT PAIN NOTED NONE PRSNT: CPT | Mod: CPTII,S$GLB,, | Performed by: INTERNAL MEDICINE

## 2022-09-29 PROCEDURE — 99999 PR PBB SHADOW E&M-EST. PATIENT-LVL III: ICD-10-PCS | Mod: PBBFAC,,, | Performed by: INTERNAL MEDICINE

## 2022-09-29 PROCEDURE — 1160F RVW MEDS BY RX/DR IN RCRD: CPT | Mod: CPTII,S$GLB,, | Performed by: INTERNAL MEDICINE

## 2022-09-29 PROCEDURE — 3008F BODY MASS INDEX DOCD: CPT | Mod: CPTII,S$GLB,, | Performed by: INTERNAL MEDICINE

## 2022-09-29 PROCEDURE — 3079F PR MOST RECENT DIASTOLIC BLOOD PRESSURE 80-89 MM HG: ICD-10-PCS | Mod: CPTII,S$GLB,, | Performed by: INTERNAL MEDICINE

## 2022-09-29 NOTE — PROGRESS NOTES
[facility] Note    Chief Complaint      Chief Complaint   Patient presents with    Follow-up     6m       History of Present Illness      Gerald Wilkinson is a 72 y.o. male who presents today for   Chief Complaint   Patient presents with    Follow-up     6m   .  Patient comes to appointment here for 6m f/u for chronic issues as below . He is complaint with all meds and diet . He needs labs with next visit . He has received covid vaccine x 2 . Bp at home 120 /80 range . He is regular exerciser and is playing golf regularly     HPI    No problem-specific Assessment & Plan notes found for this encounter.       Problem List Items Addressed This Visit          Cardiac/Vascular    Benign essential hypertension - Primary    Overview     bp well controlled . Need to try valsartan again          Borderline hyperlipidemia    Overview     Stable on diet only             Renal/    Erectile dysfunction    Overview     Cont cialis            Other    Primary insomnia    Overview     ambien prn continues to work well              Past Medical History:  History reviewed. No pertinent past medical history.    Past Surgical History:  History reviewed. No pertinent surgical history.    Family History:  family history includes Cancer in his mother and sister; Hypertension in his mother and sister.     Social History:  Social History     Socioeconomic History    Marital status:    Tobacco Use    Smoking status: Never    Smokeless tobacco: Never   Substance and Sexual Activity    Alcohol use: No    Drug use: Yes    Sexual activity: Yes     Partners: Female       Review of Systems:   Review of Systems   Constitutional:  Negative for fever and weight loss.   HENT:  Negative for congestion, hearing loss and sore throat.    Eyes:  Negative for blurred vision.   Respiratory:  Negative for cough and shortness of breath.    Cardiovascular:  Negative for chest pain, palpitations, claudication and leg swelling.  "  Gastrointestinal:  Negative for abdominal pain, constipation, diarrhea and heartburn.   Genitourinary:  Negative for dysuria.   Musculoskeletal:  Negative for back pain and myalgias.   Skin:  Negative for rash.   Neurological:  Negative for focal weakness and headaches.   Psychiatric/Behavioral:  Negative for depression and suicidal ideas. The patient is not nervous/anxious.       Medications:  Outpatient Encounter Medications as of 9/29/2022   Medication Sig Dispense Refill    celecoxib (CELEBREX) 200 MG capsule Take 1 capsule (200 mg total) by mouth once daily. 60 capsule 2    multivitamin capsule Take 1 capsule by mouth once daily.      valsartan (DIOVAN) 160 MG tablet Take 1 tablet (160 mg total) by mouth once daily. 90 tablet 3    zolpidem (AMBIEN) 5 MG Tab Take 1 tablet (5 mg total) by mouth nightly as needed. 30 tablet 0    tadalafiL (CIALIS) 20 MG Tab Take 1 tablet (20 mg total) by mouth once daily. 6 tablet 1     No facility-administered encounter medications on file as of 9/29/2022.       Allergies:  Review of patient's allergies indicates:  No Known Allergies      Physical Exam      Vitals:    09/29/22 1439   BP: 134/80   Pulse: 60   Resp: 18   Temp: 98.7 °F (37.1 °C)        Vital Signs  Temp: 98.7 °F (37.1 °C)  Temp src: Oral  Pulse: 60  Resp: 18  SpO2: 98 %  BP: 134/80  BP Location: Right arm  Patient Position: Sitting  Pain Score: 0-No pain  Height and Weight  Height: 5' 9" (175.3 cm)  Weight: 75.4 kg (166 lb 3.6 oz)  BSA (Calculated - sq m): 1.92 sq meters  BMI (Calculated): 24.5  Weight in (lb) to have BMI = 25: 168.9]     Body mass index is 24.55 kg/m².    Physical Exam  Constitutional:       Appearance: He is well-developed.   HENT:      Head: Normocephalic.   Eyes:      Pupils: Pupils are equal, round, and reactive to light.   Neck:      Thyroid: No thyromegaly.   Cardiovascular:      Rate and Rhythm: Normal rate and regular rhythm.      Heart sounds: No murmur heard.    No friction rub. No " gallop.   Pulmonary:      Effort: Pulmonary effort is normal.      Breath sounds: Normal breath sounds.   Abdominal:      General: Bowel sounds are normal.      Palpations: Abdomen is soft.   Musculoskeletal:         General: Normal range of motion.      Cervical back: Normal range of motion.   Skin:     General: Skin is warm and dry.   Neurological:      Mental Status: He is alert and oriented to person, place, and time.      Sensory: No sensory deficit.   Psychiatric:         Behavior: Behavior normal.        Laboratory:  CBC:  No results for input(s): WBC, RBC, HGB, HCT, PLT, MCV, MCH, MCHC in the last 2160 hours.  CMP:  No results for input(s): GLU, CALCIUM, ALBUMIN, PROT, NA, K, CO2, CL, BUN, ALKPHOS, ALT, AST, BILITOT in the last 2160 hours.    Invalid input(s): CREATININ  URINALYSIS:  No results for input(s): COLORU, CLARITYU, SPECGRAV, PHUR, PROTEINUA, GLUCOSEU, BILIRUBINCON, BLOODU, WBCU, RBCU, BACTERIA, MUCUS, NITRITE, LEUKOCYTESUR, UROBILINOGEN, HYALINECASTS in the last 2160 hours.   LIPIDS:  No results for input(s): TSH, HDL, CHOL, TRIG, LDLCALC, CHOLHDL, NONHDLCHOL, TOTALCHOLEST in the last 2160 hours.  TSH:  No results for input(s): TSH in the last 2160 hours.  A1C:  No results for input(s): HGBA1C in the last 2160 hours.    Radiology:        Assessment:     Gerald Wilkinson is a 72 y.o.male with:    Benign essential hypertension    Borderline hyperlipidemia    Erectile dysfunction, unspecified erectile dysfunction type    Primary insomnia              Plan:     Problem List Items Addressed This Visit          Cardiac/Vascular    Benign essential hypertension - Primary    Overview     bp well controlled . Need to try valsartan again          Borderline hyperlipidemia    Overview     Stable on diet only             Renal/    Erectile dysfunction    Overview     Cont cialis            Other    Primary insomnia    Overview     ambien prn continues to work well             As above, continue current  medications and maintain follow up with specialists.  Return to clinic in 6 months.      Frederick W Dantagnan [facility] [place]

## 2023-01-22 ENCOUNTER — PATIENT MESSAGE (OUTPATIENT)
Dept: PRIMARY CARE CLINIC | Facility: CLINIC | Age: 73
End: 2023-01-22
Payer: MEDICARE

## 2023-01-22 DIAGNOSIS — N52.9 ERECTILE DYSFUNCTION, UNSPECIFIED ERECTILE DYSFUNCTION TYPE: Primary | ICD-10-CM

## 2023-01-23 RX ORDER — VARDENAFIL HYDROCHLORIDE 20 MG/1
20 TABLET ORAL DAILY PRN
Qty: 20 TABLET | Refills: 3 | Status: SHIPPED | OUTPATIENT
Start: 2023-01-23 | End: 2023-03-30

## 2023-03-30 ENCOUNTER — OFFICE VISIT (OUTPATIENT)
Dept: PRIMARY CARE CLINIC | Facility: CLINIC | Age: 73
End: 2023-03-30
Payer: MEDICARE

## 2023-03-30 VITALS
SYSTOLIC BLOOD PRESSURE: 140 MMHG | WEIGHT: 165.56 LBS | BODY MASS INDEX: 24.52 KG/M2 | TEMPERATURE: 98 F | RESPIRATION RATE: 18 BRPM | OXYGEN SATURATION: 95 % | HEART RATE: 77 BPM | DIASTOLIC BLOOD PRESSURE: 80 MMHG | HEIGHT: 69 IN

## 2023-03-30 DIAGNOSIS — Z12.5 PROSTATE CANCER SCREENING: Primary | ICD-10-CM

## 2023-03-30 DIAGNOSIS — N52.9 ERECTILE DYSFUNCTION, UNSPECIFIED ERECTILE DYSFUNCTION TYPE: ICD-10-CM

## 2023-03-30 DIAGNOSIS — I10 BENIGN ESSENTIAL HYPERTENSION: ICD-10-CM

## 2023-03-30 DIAGNOSIS — M25.511 CHRONIC RIGHT SHOULDER PAIN: ICD-10-CM

## 2023-03-30 DIAGNOSIS — F51.01 PRIMARY INSOMNIA: ICD-10-CM

## 2023-03-30 DIAGNOSIS — G89.29 CHRONIC RIGHT SHOULDER PAIN: ICD-10-CM

## 2023-03-30 DIAGNOSIS — E78.5 BORDERLINE HYPERLIPIDEMIA: ICD-10-CM

## 2023-03-30 PROCEDURE — 3008F PR BODY MASS INDEX (BMI) DOCUMENTED: ICD-10-PCS | Mod: CPTII,S$GLB,, | Performed by: INTERNAL MEDICINE

## 2023-03-30 PROCEDURE — 3008F BODY MASS INDEX DOCD: CPT | Mod: CPTII,S$GLB,, | Performed by: INTERNAL MEDICINE

## 2023-03-30 PROCEDURE — 1126F AMNT PAIN NOTED NONE PRSNT: CPT | Mod: CPTII,S$GLB,, | Performed by: INTERNAL MEDICINE

## 2023-03-30 PROCEDURE — 1159F PR MEDICATION LIST DOCUMENTED IN MEDICAL RECORD: ICD-10-PCS | Mod: CPTII,S$GLB,, | Performed by: INTERNAL MEDICINE

## 2023-03-30 PROCEDURE — 1101F PR PT FALLS ASSESS DOC 0-1 FALLS W/OUT INJ PAST YR: ICD-10-PCS | Mod: CPTII,S$GLB,, | Performed by: INTERNAL MEDICINE

## 2023-03-30 PROCEDURE — 3288F FALL RISK ASSESSMENT DOCD: CPT | Mod: CPTII,S$GLB,, | Performed by: INTERNAL MEDICINE

## 2023-03-30 PROCEDURE — 99214 PR OFFICE/OUTPT VISIT, EST, LEVL IV, 30-39 MIN: ICD-10-PCS | Mod: S$GLB,,, | Performed by: INTERNAL MEDICINE

## 2023-03-30 PROCEDURE — 99999 PR PBB SHADOW E&M-EST. PATIENT-LVL III: ICD-10-PCS | Mod: PBBFAC,,, | Performed by: INTERNAL MEDICINE

## 2023-03-30 PROCEDURE — 3077F PR MOST RECENT SYSTOLIC BLOOD PRESSURE >= 140 MM HG: ICD-10-PCS | Mod: CPTII,S$GLB,, | Performed by: INTERNAL MEDICINE

## 2023-03-30 PROCEDURE — 1101F PT FALLS ASSESS-DOCD LE1/YR: CPT | Mod: CPTII,S$GLB,, | Performed by: INTERNAL MEDICINE

## 2023-03-30 PROCEDURE — 3288F PR FALLS RISK ASSESSMENT DOCUMENTED: ICD-10-PCS | Mod: CPTII,S$GLB,, | Performed by: INTERNAL MEDICINE

## 2023-03-30 PROCEDURE — 4010F PR ACE/ARB THEARPY RXD/TAKEN: ICD-10-PCS | Mod: CPTII,S$GLB,, | Performed by: INTERNAL MEDICINE

## 2023-03-30 PROCEDURE — 4010F ACE/ARB THERAPY RXD/TAKEN: CPT | Mod: CPTII,S$GLB,, | Performed by: INTERNAL MEDICINE

## 2023-03-30 PROCEDURE — 3077F SYST BP >= 140 MM HG: CPT | Mod: CPTII,S$GLB,, | Performed by: INTERNAL MEDICINE

## 2023-03-30 PROCEDURE — 1159F MED LIST DOCD IN RCRD: CPT | Mod: CPTII,S$GLB,, | Performed by: INTERNAL MEDICINE

## 2023-03-30 PROCEDURE — 99214 OFFICE O/P EST MOD 30 MIN: CPT | Mod: S$GLB,,, | Performed by: INTERNAL MEDICINE

## 2023-03-30 PROCEDURE — 99999 PR PBB SHADOW E&M-EST. PATIENT-LVL III: CPT | Mod: PBBFAC,,, | Performed by: INTERNAL MEDICINE

## 2023-03-30 PROCEDURE — 3079F PR MOST RECENT DIASTOLIC BLOOD PRESSURE 80-89 MM HG: ICD-10-PCS | Mod: CPTII,S$GLB,, | Performed by: INTERNAL MEDICINE

## 2023-03-30 PROCEDURE — 1126F PR PAIN SEVERITY QUANTIFIED, NO PAIN PRESENT: ICD-10-PCS | Mod: CPTII,S$GLB,, | Performed by: INTERNAL MEDICINE

## 2023-03-30 PROCEDURE — 3079F DIAST BP 80-89 MM HG: CPT | Mod: CPTII,S$GLB,, | Performed by: INTERNAL MEDICINE

## 2023-03-30 RX ORDER — TADALAFIL 20 MG/1
20 TABLET ORAL DAILY
Qty: 6 TABLET | Refills: 1 | Status: SHIPPED | OUTPATIENT
Start: 2023-03-30 | End: 2024-03-11

## 2023-03-30 NOTE — PROGRESS NOTES
[facility] Note    Chief Complaint      Chief Complaint   Patient presents with    Follow-up     6m       History of Present Illness      Gerald Wilkinson is a 72 y.o. male who presents today for   Chief Complaint   Patient presents with    Follow-up     6m   .  Patient comes to appointment here for 6 m checkup for chronic issues below . He also is complaining  of soreness in right shoulder . Had visit with ortho for shoulder and knee oa. He is compliant with all meds . Needs labs done with this visit     HPI    No problem-specific Assessment & Plan notes found for this encounter.       Problem List Items Addressed This Visit          Cardiac/Vascular    Benign essential hypertension    Overview     bp well controlled cont current          Borderline hyperlipidemia    Overview     Stable on diet only needs repeat labs            Renal/    Erectile dysfunction    Overview     Cont cialis is working well          Prostate cancer screening - Primary    Overview     Check psa             Orthopedic    Chronic right shoulder pain    Overview     Will refer to pt for eval and treat             Other    Primary insomnia    Overview     ambien prn continues to work well              Past Medical History:  History reviewed. No pertinent past medical history.    Past Surgical History:  History reviewed. No pertinent surgical history.    Family History:  family history includes Cancer in his mother and sister; Hypertension in his mother and sister.     Social History:  Social History     Socioeconomic History    Marital status:    Tobacco Use    Smoking status: Never    Smokeless tobacco: Never   Substance and Sexual Activity    Alcohol use: No    Drug use: Yes    Sexual activity: Yes     Partners: Female       Review of Systems:   Review of Systems   Constitutional:  Negative for fever and weight loss.   HENT:  Negative for congestion, hearing loss and sore throat.    Eyes:  Negative for  "blurred vision.   Respiratory:  Negative for cough and shortness of breath.    Cardiovascular:  Negative for chest pain, palpitations, claudication and leg swelling.   Gastrointestinal:  Negative for abdominal pain, constipation, diarrhea and heartburn.   Genitourinary:  Negative for dysuria.   Musculoskeletal:  Negative for back pain and myalgias.   Skin:  Negative for rash.   Neurological:  Negative for focal weakness and headaches.   Psychiatric/Behavioral:  Negative for depression, memory loss and suicidal ideas. The patient is not nervous/anxious.       Medications:  Outpatient Encounter Medications as of 3/30/2023   Medication Sig Dispense Refill    celecoxib (CELEBREX) 200 MG capsule Take 1 capsule (200 mg total) by mouth once daily. 60 capsule 2    multivitamin capsule Take 1 capsule by mouth once daily.      valsartan (DIOVAN) 160 MG tablet Take 1 tablet (160 mg total) by mouth once daily. 90 tablet 3    zolpidem (AMBIEN) 5 MG Tab Take 1 tablet (5 mg total) by mouth nightly as needed. 30 tablet 0    [DISCONTINUED] tadalafiL (CIALIS) 20 MG Tab Take 1 tablet (20 mg total) by mouth once daily. 6 tablet 1    tadalafiL (CIALIS) 20 MG Tab Take 1 tablet (20 mg total) by mouth once daily. 6 tablet 1    [DISCONTINUED] vardenafiL (LEVITRA) 20 MG tablet Take 1 tablet (20 mg total) by mouth daily as needed for Erectile Dysfunction. (Patient not taking: Reported on 3/30/2023) 20 tablet 3     No facility-administered encounter medications on file as of 3/30/2023.       Allergies:  Review of patient's allergies indicates:  No Known Allergies      Physical Exam      Vitals:    03/30/23 1424   BP: (!) 140/80   Pulse: 77   Resp: 18   Temp: 98.4 °F (36.9 °C)        Vital Signs  Temp: 98.4 °F (36.9 °C)  Temp Source: Oral  Pulse: 77  Resp: 18  SpO2: 95 %  BP: (!) 140/80  BP Location: Right arm  Patient Position: Sitting  Pain Score: 0-No pain  Height and Weight  Height: 5' 9" (175.3 cm)  Weight: 75.1 kg (165 lb 9.1 oz)  BSA " (Calculated - sq m): 1.91 sq meters  BMI (Calculated): 24.4  Weight in (lb) to have BMI = 25: 168.9]     Body mass index is 24.45 kg/m².    Physical Exam  Constitutional:       Appearance: He is well-developed.   HENT:      Head: Normocephalic.   Eyes:      Pupils: Pupils are equal, round, and reactive to light.   Neck:      Thyroid: No thyromegaly.   Cardiovascular:      Rate and Rhythm: Normal rate and regular rhythm.      Heart sounds: No murmur heard.    No friction rub. No gallop.   Pulmonary:      Effort: Pulmonary effort is normal.      Breath sounds: Normal breath sounds.   Abdominal:      General: Bowel sounds are normal.      Palpations: Abdomen is soft.   Musculoskeletal:         General: Normal range of motion.      Cervical back: Normal range of motion.   Skin:     General: Skin is warm and dry.   Neurological:      Mental Status: He is alert and oriented to person, place, and time.      Sensory: No sensory deficit.   Psychiatric:         Behavior: Behavior normal.        Laboratory:  CBC:  No results for input(s): WBC, RBC, HGB, HCT, PLT, MCV, MCH, MCHC in the last 2160 hours.  CMP:  No results for input(s): GLU, CALCIUM, ALBUMIN, PROT, NA, K, CO2, CL, BUN, ALKPHOS, ALT, AST, BILITOT in the last 2160 hours.    Invalid input(s): CREATININ  URINALYSIS:  No results for input(s): COLORU, CLARITYU, SPECGRAV, PHUR, PROTEINUA, GLUCOSEU, BILIRUBINCON, BLOODU, WBCU, RBCU, BACTERIA, MUCUS, NITRITE, LEUKOCYTESUR, UROBILINOGEN, HYALINECASTS in the last 2160 hours.   LIPIDS:  No results for input(s): TSH, HDL, CHOL, TRIG, LDLCALC, CHOLHDL, NONHDLCHOL, TOTALCHOLEST in the last 2160 hours.  TSH:  No results for input(s): TSH in the last 2160 hours.  A1C:  No results for input(s): HGBA1C in the last 2160 hours.    Radiology:        Assessment:     Gerald Wilkinson is a 72 y.o.male with:    Prostate cancer screening  -     PSA, Screening; Future; Expected date: 03/30/2023    Primary insomnia    Erectile dysfunction,  unspecified erectile dysfunction type  -     tadalafiL (CIALIS) 20 MG Tab; Take 1 tablet (20 mg total) by mouth once daily.  Dispense: 6 tablet; Refill: 1    Borderline hyperlipidemia  -     Comprehensive Metabolic Panel; Future; Expected date: 03/30/2023  -     Lipid Panel; Future; Expected date: 03/30/2023    Benign essential hypertension  -     CBC Auto Differential; Future; Expected date: 03/30/2023    Chronic right shoulder pain  -     Ambulatory referral/consult to Physical/Occupational Therapy; Future; Expected date: 04/06/2023                Plan:     Problem List Items Addressed This Visit          Cardiac/Vascular    Benign essential hypertension    Overview     bp well controlled cont current          Borderline hyperlipidemia    Overview     Stable on diet only needs repeat labs            Renal/    Erectile dysfunction    Overview     Cont cialis is working well          Prostate cancer screening - Primary    Overview     Check psa             Orthopedic    Chronic right shoulder pain    Overview     Will refer to pt for eval and treat             Other    Primary insomnia    Overview     ambien prn continues to work well             As above, continue current medications and maintain follow up with specialists.  Return to clinic in 6 months.      Frederick W Dantagnan [facility]

## 2023-04-17 ENCOUNTER — CLINICAL SUPPORT (OUTPATIENT)
Dept: REHABILITATION | Facility: OTHER | Age: 73
End: 2023-04-17
Payer: MEDICARE

## 2023-04-17 DIAGNOSIS — M25.611 DECREASED RANGE OF MOTION OF RIGHT SHOULDER: ICD-10-CM

## 2023-04-17 DIAGNOSIS — G89.29 CHRONIC RIGHT SHOULDER PAIN: ICD-10-CM

## 2023-04-17 DIAGNOSIS — M25.511 CHRONIC RIGHT SHOULDER PAIN: ICD-10-CM

## 2023-04-17 DIAGNOSIS — R53.1 DECREASED STRENGTH: ICD-10-CM

## 2023-04-17 PROCEDURE — 97110 THERAPEUTIC EXERCISES: CPT | Mod: PN

## 2023-04-17 PROCEDURE — 97161 PT EVAL LOW COMPLEX 20 MIN: CPT | Mod: PN

## 2023-04-17 NOTE — PLAN OF CARE
[facility]   Physical Therapy Initial Evaluation     Date: 4/17/2023   Name: Gerald Wilkinson  Clinic Number: [id number]    Therapy Diagnosis:   Encounter Diagnoses   Name Primary?    Chronic right shoulder pain     Decreased range of motion of right shoulder     Decreased strength      Physician: Phil Tellez*    Physician Orders: PT Eval and Treat   Medical Diagnosis from Referral: M25.511,G89.29 (ICD-10-CM) - Chronic right shoulder pain   Evaluation Date: 4/17/2023  Authorization Period Expiration: 5/17/2023  Plan of Care Expiration: 7/10/2023  Progress Note Due: 5/15/2023  Visit # / Visits authorized: 1/ 1   FOTO: 1/3    Precautions: Standard     Time In: 2:32 pm  Time Out: 3:17 pm  Total Appointment Time (timed & untimed codes): 45 minutes      SUBJECTIVE     Date of onset: December 2021    History of current condition - Gerald reports: 3rd covid shot in right shoulder and immediately after his arm was always sore. Patient reports that he also has a bone spur on that shoulder. Patient reports he went to ortho doctor and concluded that it is OSTEOARTHRITIS of his shoulder. Patient reports he put him on Celebrex which did not help, allieve helps a little more. Patient reports that this January 16 he was gardening and his right shoulder progressively got worse, was unable to reach behind his back or put his belt on. Patient reports since then it has gotten moderately better. Patient reports stopped playing golf for about 3 months due to knee pain. Patient reports that mostly reaching behind his back is painful, reaching overhead. Patient reports that he used to not be able to sleep, however, has much improved. Sometimes he wakes up with pain. Patient reports he gets some radiating down his right arm to the back of his right hand (achiness sensation)    Falls: none    Imaging, none recent    Prior Therapy: none   Social History: lives with their spouse  Occupation: consultant  work  Prior Level of Function: able to reach behind back, lift heavy, play golf without difficulties   Current Level of Function: decreased ability to reach behind back, prolonged tasks with his shoulder    Pain:  Current 0/10, worst 4/10, best 0/10   Location: right anterolateral shoulder, neck musculature  (UT)  Description: Burning and Sharp  Aggravating Factors: Lifting and reaching, reaching behind back   Easing Factors: pain medication    Patients goals: decrease pain, reach behind back without pain, return to playing golf     Medical History:   No past medical history on file.    Surgical History:   Gerald Wilkinson  has no past surgical history on file.    Medications:   Gerald has a current medication list which includes the following prescription(s): celecoxib, multivitamin, tadalafil, valsartan, and zolpidem.    Allergies:   Review of patient's allergies indicates:  No Known Allergies       OBJECTIVE     Observation: seated posture: FHP, rounded shoulders    Posture: FHP, rounded shoulders, increased thoracic kyphosis     Passive Range of Motion:   Shoulder Right Left   Flexion 175 180   Abduction 165 175   ER at 0 NT NT   ER at 90 100 90   IR 20 45      Active Range of Motion:   Shoulder Right Left   Flexion 160* 170   Abduction 170* 170   ER at 0 70 80   ER at 90 80 80   IR WNL WNL   Reach behind head T4 T4   Reach behind back  L1 T10     Cervical RL: 45  Cervical RR: 60  FB: WITHIN NORMAL LIMIT  BB:40  Cervical SBR: 15  Cervical SBL: 15    Strength:  Shoulder Right Left   Flexion 4+/5 5/5   Abduction 4+/5* 5/5   ER 3+/5* 5/5   IR 4+/5* 5/5   Serratus Anterior 4/5 5/5   Middle Trap NT NT   Low Trap NT NT       Special Tests:  Impingement Cluster:   Right Left   Hawkin's Kenndy - -   Painful Arc - -   Resisted ER + -     Rotator Cuff Tear   Right Left   Painful Arc - -   Empty Can Test - -   Drop Arm test - -   Subscapularis Lift Off - -     Instability:   Right Left   Anterior Apprehension Test - -    Relocation test - -   Posterior Apprehension Test - -   Sulcus Sign - -     SLAP:   Right Left   Biceps Load II - -   O'Mando's Test - -     Other:   Right Left   AC Joint Compression Test NT NT   Empty Can - -       Joint Mobility: hypomobile posterior glide bilateral (left > right)    Palpation: left pectoral muscle group, bicep tendon, deltoid musculature, posterior cuff (teres minor, infraspinatus tendons)    Sensation: WNL    Flexibility:   Lat: R + ; L +   Pec Minor: R + ; L +       Limitation/Restriction for FOTO 1/3 Survey    Therapist reviewed FOTO scores for Gerald Wilkinson on 4/17/2023.   FOTO documents entered into [employer] - see Media section.    Limitation Score: 30%         TREATMENT     Total Treatment time (time-based codes) separate from Evaluation: 12 minutes      Gerald received the treatments listed below:      therapeutic exercises to develop strength, endurance, ROM, flexibility, and posture for 4 minutes including:  Sidelying INTERNAL ROTATION stretch 30 sec x 2  Pec stretch doorway 30 sec x 2   Scapular retractions 5 sec holds x 5    manual therapy techniques: Joint mobilizations, Myofacial release, and Soft tissue Mobilization were applied to the: right shoulder girdle for 8 minutes, including:  Right GH joint glides, posterior, inferior, lateral distraction 6 min   Cross friction to long head of bicep tendon, anterior/middle deltoid 2 min      PATIENT EDUCATION AND HOME EXERCISES     Education provided:   - patient education on HOME EXERCISE PROGRAM, tissue specific impairments    Written Home Exercises Provided: yes. Exercises were reviewed and Gerald was able to demonstrate them prior to the end of the session.  Gerald demonstrated good  understanding of the education provided. See EMR under Patient Instructions for exercises provided during therapy sessions.    ASSESSMENT     Gerald is a 72 y.o. male referred to outpatient Physical Therapy with a medical diagnosis of M25.511,G89.29  (ICD-10-CM) - Chronic right shoulder pain. Patient presents with signs and symptoms consistent with right shoulder supraspinatus and bicep tendionsis and degenerative changes of right GH joint. Patient demonstrates decreased right shoulder range of motion, flexibility, strength, neuromuscular control, and abnormal posture leading to increased pain with reaching behind back, playing recreational activities, and prolonged use of RIGHT UPPER EXTREMITY.     Patient prognosis is Good.   Patient will benefit from skilled outpatient Physical Therapy to address the deficits stated above and in the chart below, provide patient /family education, and to maximize patientt's level of independence.     Plan of care discussed with patient: Yes  Patient's spiritual, cultural and educational needs considered and patient is agreeable to the plan of care and goals as stated below:     Anticipated Barriers for therapy: none    Medical Necessity is demonstrated by the following  History  Co-morbidities and personal factors that may impact the plan of care Co-morbidities:   HTN    Personal Factors:   age  coping style  attitudes     low   Examination  Body Structures and Functions, activity limitations and participation restrictions that may impact the plan of care Body Regions:   upper extremities    Body Systems:    gross symmetry  ROM  strength  gross coordinated movement  motor control    Participation Restrictions:   Golf, recreational activities     Activity limitations:   Learning and applying knowledge  no deficits    General Tasks and Commands  no deficits    Communication  no deficits    Mobility  lifting and carrying objects    Self care  no deficits    Domestic Life  no deficits    Interactions/Relationships  no deficits    Life Areas  no deficits    Community and Social Life  no deficits         low   Clinical Presentation evolving clinical presentation with changing clinical characteristics moderate   Decision Making/  Complexity Score: low     GOALS: Short Term Goals:  6 weeks  1.Report decreased right shoulder pain < / =  2/10  to increase tolerance for reaching behind back, tucking in shirt  2. Increase PROM of left shoulder INTERNAL ROTATION to 45 degrees for improved reaching behind back, tucking in shirt  3. Increased strength by 1/3 MMT grade in right shoulder ER to increase tolerance for ADL and work activities.  4. Pt to tolerate HEP to improve ROM and independence with ADL's    Long Term Goals: 12 weeks  1.Perform weighted ball circles on wall for 1 min each direction at 90 degrees for improved overhead endurance tasks   2.Increase AROM to left shoulder INTERNAL ROTATION to T10 for improved functional movements  3.Increase strength to >/= 5/5 in right shoulder EXTERNAL ROTATION, flexion, abduction to increase tolerance for ADL and work activities.  4. Pt goal: return to golf with </= 1/10 pain in right shoulder   5. Pt will have improved gcode of 20% on FOTO shoulder in order to demonstrate true functional improvement.     PLAN   Plan of care Certification: 4/17/2023 to 7/10/2023.    Outpatient Physical Therapy 2 times weekly for 12 weeks to include the following interventions: Electrical Stimulation , Iontophoresis (with ), Manual Therapy, Moist Heat/ Ice, Neuromuscular Re-ed, Patient Education, Self Care, Therapeutic Activities, Therapeutic Exercise, and Dry-Needling.     Abbie Parsons, PT      I CERTIFY THE NEED FOR THESE SERVICES FURNISHED UNDER THIS PLAN OF TREATMENT AND WHILE UNDER MY CARE   Physician's comments:     Physician's Signature: ___________________________________________________

## 2023-04-25 ENCOUNTER — LAB VISIT (OUTPATIENT)
Dept: LAB | Facility: HOSPITAL | Age: 73
End: 2023-04-25
Attending: INTERNAL MEDICINE
Payer: MEDICARE

## 2023-04-25 DIAGNOSIS — E78.5 BORDERLINE HYPERLIPIDEMIA: ICD-10-CM

## 2023-04-25 DIAGNOSIS — I10 BENIGN ESSENTIAL HYPERTENSION: ICD-10-CM

## 2023-04-25 DIAGNOSIS — Z12.5 PROSTATE CANCER SCREENING: ICD-10-CM

## 2023-04-25 LAB
ALBUMIN SERPL BCP-MCNC: 4.2 G/DL (ref 3.5–5.2)
ALP SERPL-CCNC: 37 U/L (ref 55–135)
ALT SERPL W/O P-5'-P-CCNC: 24 U/L (ref 10–44)
ANION GAP SERPL CALC-SCNC: 6 MMOL/L (ref 8–16)
AST SERPL-CCNC: 28 U/L (ref 10–40)
BASOPHILS # BLD AUTO: 0.05 K/UL (ref 0–0.2)
BASOPHILS NFR BLD: 0.9 % (ref 0–1.9)
BILIRUB SERPL-MCNC: 1 MG/DL (ref 0.1–1)
BUN SERPL-MCNC: 20 MG/DL (ref 8–23)
CALCIUM SERPL-MCNC: 9.6 MG/DL (ref 8.7–10.5)
CHLORIDE SERPL-SCNC: 106 MMOL/L (ref 95–110)
CHOLEST SERPL-MCNC: 169 MG/DL (ref 120–199)
CHOLEST/HDLC SERPL: 3.5 {RATIO} (ref 2–5)
CO2 SERPL-SCNC: 27 MMOL/L (ref 23–29)
COMPLEXED PSA SERPL-MCNC: 3.5 NG/ML (ref 0–4)
CREAT SERPL-MCNC: 0.9 MG/DL (ref 0.5–1.4)
DIFFERENTIAL METHOD: NORMAL
EOSINOPHIL # BLD AUTO: 0.1 K/UL (ref 0–0.5)
EOSINOPHIL NFR BLD: 2.5 % (ref 0–8)
ERYTHROCYTE [DISTWIDTH] IN BLOOD BY AUTOMATED COUNT: 12.3 % (ref 11.5–14.5)
EST. GFR  (NO RACE VARIABLE): >60 ML/MIN/1.73 M^2
GLUCOSE SERPL-MCNC: 85 MG/DL (ref 70–110)
HCT VFR BLD AUTO: 47.2 % (ref 40–54)
HDLC SERPL-MCNC: 48 MG/DL (ref 40–75)
HDLC SERPL: 28.4 % (ref 20–50)
HGB BLD-MCNC: 15.1 G/DL (ref 14–18)
IMM GRANULOCYTES # BLD AUTO: 0.02 K/UL (ref 0–0.04)
IMM GRANULOCYTES NFR BLD AUTO: 0.4 % (ref 0–0.5)
LDLC SERPL CALC-MCNC: 112.4 MG/DL (ref 63–159)
LYMPHOCYTES # BLD AUTO: 1.4 K/UL (ref 1–4.8)
LYMPHOCYTES NFR BLD: 24.9 % (ref 18–48)
MCH RBC QN AUTO: 29.2 PG (ref 27–31)
MCHC RBC AUTO-ENTMCNC: 32 G/DL (ref 32–36)
MCV RBC AUTO: 91 FL (ref 82–98)
MONOCYTES # BLD AUTO: 0.7 K/UL (ref 0.3–1)
MONOCYTES NFR BLD: 12.2 % (ref 4–15)
NEUTROPHILS # BLD AUTO: 3.3 K/UL (ref 1.8–7.7)
NEUTROPHILS NFR BLD: 59.1 % (ref 38–73)
NONHDLC SERPL-MCNC: 121 MG/DL
NRBC BLD-RTO: 0 /100 WBC
PLATELET # BLD AUTO: 232 K/UL (ref 150–450)
PMV BLD AUTO: 11.6 FL (ref 9.2–12.9)
POTASSIUM SERPL-SCNC: 4.3 MMOL/L (ref 3.5–5.1)
PROT SERPL-MCNC: 7.4 G/DL (ref 6–8.4)
RBC # BLD AUTO: 5.17 M/UL (ref 4.6–6.2)
SODIUM SERPL-SCNC: 139 MMOL/L (ref 136–145)
TRIGL SERPL-MCNC: 43 MG/DL (ref 30–150)
WBC # BLD AUTO: 5.5 K/UL (ref 3.9–12.7)

## 2023-04-25 PROCEDURE — 36415 COLL VENOUS BLD VENIPUNCTURE: CPT | Performed by: INTERNAL MEDICINE

## 2023-04-25 PROCEDURE — 84153 ASSAY OF PSA TOTAL: CPT | Performed by: INTERNAL MEDICINE

## 2023-04-25 PROCEDURE — 80061 LIPID PANEL: CPT | Performed by: INTERNAL MEDICINE

## 2023-04-25 PROCEDURE — 80053 COMPREHEN METABOLIC PANEL: CPT | Performed by: INTERNAL MEDICINE

## 2023-04-25 PROCEDURE — 85025 COMPLETE CBC W/AUTO DIFF WBC: CPT | Performed by: INTERNAL MEDICINE

## 2023-04-26 DIAGNOSIS — I10 BENIGN ESSENTIAL HYPERTENSION: ICD-10-CM

## 2023-04-26 RX ORDER — VALSARTAN 160 MG/1
160 TABLET ORAL DAILY
Qty: 90 TABLET | Refills: 3 | Status: ON HOLD | OUTPATIENT
Start: 2023-04-26 | End: 2024-03-31 | Stop reason: HOSPADM

## 2023-04-26 NOTE — TELEPHONE ENCOUNTER
----- Message from Nakul Acuña sent at 4/26/2023  9:45 AM CDT -----  Contact: [phone]  Requesting an RX refill or new RX.  Is this a refill or new RX: refill  RX name and strength (copy/paste from chart):  valsartan (DIOVAN) 160 MG tablet  Is this a 30 day or 90 day RX:   Pharmacy name and phone # (copy/paste from chart):    [id number] - [address] [place] [facility] [place] [address]  [address]  [facility] [address]  Phone: [phone] Fax: [phone]  The doctors have asked that we provide their patients with the following 2 reminders -- prescription refills can take up to 72 hours, and a friendly reminder that in the future you can use your [facility] account to request refills: call back

## 2023-04-28 ENCOUNTER — CLINICAL SUPPORT (OUTPATIENT)
Dept: REHABILITATION | Facility: OTHER | Age: 73
End: 2023-04-28
Payer: MEDICARE

## 2023-04-28 DIAGNOSIS — M25.611 DECREASED RANGE OF MOTION OF RIGHT SHOULDER: Primary | ICD-10-CM

## 2023-04-28 DIAGNOSIS — R53.1 DECREASED STRENGTH: ICD-10-CM

## 2023-04-28 PROCEDURE — 97140 MANUAL THERAPY 1/> REGIONS: CPT | Mod: PN | Performed by: PHYSICAL THERAPIST

## 2023-04-28 PROCEDURE — 97530 THERAPEUTIC ACTIVITIES: CPT | Mod: PN | Performed by: PHYSICAL THERAPIST

## 2023-04-28 NOTE — PROGRESS NOTES
"[facility]   Physical Therapy Treatment Note      Name: Gerald Wilkinson  Clinic Number: [id number]    Therapy Diagnosis:   Encounter Diagnoses   Name Primary?    Decreased range of motion of right shoulder Yes    Decreased strength      Physician: Phil Tellez*    Visit Date: 4/28/2023    Physician Orders: PT Eval and Treat   Medical Diagnosis from Referral: M25.511,G89.29 (ICD-10-CM) - Chronic right shoulder pain   Evaluation Date: 4/17/2023  Authorization Period Expiration: 5/17/2023  Plan of Care Expiration: 7/10/2023  Progress Note Due: 5/15/2023  Visit # / Visits authorized: 2/ 21   FOTO: 1/3     Precautions: Standard     PTA Visit #: 0/5     Time In: 0920  Time Out: 1005  Total Billable Time: 45 minutes    Subjective     Pt reports: he has noticed some improvement in his range since evaluation, but still limited with reaching up behind his back. Reports no pain at rest this morning, but still having pain with certain motions. He says sometimes he will get pain that radiates down UE to wrist.  He was compliant with home exercise program.  Response to previous treatment: no change with evaluation  Functional change: some improvement to range    Pain: 1/10  Location: right anterolateral shoulder, neck musculature (UT)     Objective      Objective Measures updated at progress report unless specified.     Treatment     Gerald received the treatments listed below:      therapeutic exercises to develop strength, endurance, ROM, flexibility, posture, and core stabilization for 5 minutes including:  IR strap stretch 5 x 10"    manual therapy techniques: Joint mobilizations were applied to the: shoulder and TSP for 15 minutes, including:  Anterior and posterior glides to R shoulder  PA oscillatory mobilization to mid TSP  Lateral glides to CSP   MET for R lateral bending and rotation  Seated MET (genie) to CTJ    neuromuscular re-education activities to improve: Proprioception and " Posture for 0 minutes. The following activities were included:  Initiate as tolerated    therapeutic activities to improve functional performance for 25  minutes, including:  Subjective history and pt education regarding anatomy and role of therapy    Patient Education and Home Exercises       Education provided:   - Therapy rationale and plan of care    Written Home Exercises Provided: yes. Exercises were reviewed and Gerald was able to demonstrate them prior to the end of the session.  Gerald demonstrated good  understanding of the education provided. See EMR under Patient Instructions for exercises provided during therapy sessions    Assessment     Significant time spent with pt assessment and addressing pt questions this morning, so limited progression of therex. Good tolerance to addition of strap stretch for IR, with cuing to decrease anterior tipping of scapula. Some improvement noted in combined IR following anterior GH glides, but continues with limited motion. Noted stiffness to cervical and thoracic spine, with postural limitations contributing to c/c. Good tolerance to manual interventions today.     Gerald Is progressing well towards his goals.   Pt prognosis is Good.     Pt will continue to benefit from skilled outpatient physical therapy to address the deficits listed in the problem list box on initial evaluation, provide pt/family education and to maximize pt's level of independence in the home and community environment.     Pt's spiritual, cultural and educational needs considered and pt agreeable to plan of care and goals.     Anticipated barriers to physical therapy: none     Goals: updated 04/28/2023   Short Term Goals:  6 weeks  1.Report decreased right shoulder pain < / =  2/10  to increase tolerance for reaching behind back, tucking in shirt. (Progressing, not met)  2. Increase PROM of left shoulder INTERNAL ROTATION to 45 degrees for improved reaching behind back, tucking in shirt  (Progressing, not met)  3. Increased strength by 1/3 MMT grade in right shoulder ER to increase tolerance for ADL and work activities. (Progressing, not met)  4. Pt to tolerate HEP to improve ROM and independence with ADL's (Progressing, not met)     Long Term Goals: 12 weeks  1.Perform weighted ball circles on wall for 1 min each direction at 90 degrees for improved overhead endurance tasks (Progressing, not met)  2.Increase AROM to left shoulder INTERNAL ROTATION to T10 for improved functional movements (Progressing, not met)  3.Increase strength to >/= 5/5 in right shoulder EXTERNAL ROTATION, flexion, abduction to increase tolerance for ADL and work activities. (Progressing, not met)  4. Pt goal: return to golf with </= 1/10 pain in right shoulder  (Progressing, not met)  5. Pt will have improved gcode of 20% on FOTO shoulder in order to demonstrate true functional improvement.  (Progressing, not met)    Plan   Plan of care Certification: 4/17/2023 to 7/10/2023.     Continue per plan of care with focus on postural stability and R shoulder ROM.     Elsa Baker, PT

## 2023-05-01 ENCOUNTER — DOCUMENTATION ONLY (OUTPATIENT)
Dept: REHABILITATION | Facility: OTHER | Age: 73
End: 2023-05-01

## 2023-05-01 ENCOUNTER — CLINICAL SUPPORT (OUTPATIENT)
Dept: REHABILITATION | Facility: OTHER | Age: 73
End: 2023-05-01
Payer: MEDICARE

## 2023-05-01 DIAGNOSIS — M25.611 DECREASED RANGE OF MOTION OF RIGHT SHOULDER: Primary | ICD-10-CM

## 2023-05-01 DIAGNOSIS — R53.1 DECREASED STRENGTH: ICD-10-CM

## 2023-05-01 PROCEDURE — 97112 NEUROMUSCULAR REEDUCATION: CPT | Mod: PN,CQ

## 2023-05-01 PROCEDURE — 97110 THERAPEUTIC EXERCISES: CPT | Mod: PN,CQ

## 2023-05-01 NOTE — PROGRESS NOTES
"[facility]   Physical Therapy Treatment Note      Name: Gerald Wilkinson  Clinic Number: [id number]    Therapy Diagnosis:   Encounter Diagnoses   Name Primary?    Decreased range of motion of right shoulder Yes    Decreased strength      Physician: Phil Tellez*    Visit Date: 5/1/2023    Physician Orders: PT Eval and Treat   Medical Diagnosis from Referral: M25.511,G89.29 (ICD-10-CM) - Chronic right shoulder pain   Evaluation Date: 4/17/2023  Authorization Period Expiration: 5/17/2023  Plan of Care Expiration: 7/10/2023  Progress Note Due: 5/15/2023  Visit # / Visits authorized: 3 (2/ 20)   FOTO: 1/3     Precautions: Standard     PTA Visit #: 1/5     Time In: 3:35 pm (Late arrival)  Time Out: 4:13 pm  Total Billable Time: 38 minutes    Subjective   Pt reports: "The shoulder keeps getting better"    He was compliant with home exercise program.  Response to previous treatment: "Sore till Sunday"  Functional change: some improvement to range    Pain: 0/10  Location: right anterolateral shoulder, neck musculature (UT)     Objective      Objective Measures updated at progress report unless specified.     Treatment     Gerald received the bolded treatments listed below:      therapeutic exercises to develop strength, endurance, ROM, flexibility, posture, and core stabilization for 23 minutes including:  IR strap stretch 5 x 10"  +Supine pec stretch on half foam x 2'  +Supine on half foam D2 flex 2 x 10  +Supine on half foam hor abd thin orange monster band 2 x 10  +Supine on half foam B shoulder ER thin orange monster band 2 x 10  +Shoulder ER/IR walkout RTB x 10    Future visit:  robbery, row, lat pulls, flasher    manual therapy techniques: Joint mobilizations were applied to the: shoulder and TSP for 00 minutes, including:  Anterior and posterior glides to R shoulder  PA oscillatory mobilization to mid TSP  Lateral glides to CSP   MET for R lateral bending and rotation  Seated MET " "(iesha) to CTJ    neuromuscular re-education activities to improve: Proprioception and Posture for 15 minutes. The following activities were included:  +Prone scap squeezes x 20  5" hold  +Prone I's x 20  +Prone T's x 20  +Prone Y's x 20    therapeutic activities to improve functional performance for 00 minutes, including:  Subjective history and pt education regarding anatomy and role of therapy    Patient Education and Home Exercises       Education provided:   - Exercise form and rationale    Written Home Exercises Provided: Patient instructed to cont prior HEP. Exercises were reviewed and Gerald was able to demonstrate them prior to the end of the session.  Gerald demonstrated good  understanding of the education provided. See EMR under Patient Instructions for exercises provided during therapy sessions    Assessment   Abbreviated treatment today due to late arrival. Pt tolerated treatment with no complaint of pain. Added prone exercise series with noted stiffness in thoracic and cervical spine. Overhead ROM was limited, but pt was able to perform overhead exercises in supine.     Gerald Is progressing well towards his goals.   Pt prognosis is Good.     Pt will continue to benefit from skilled outpatient physical therapy to address the deficits listed in the problem list box on initial evaluation, provide pt/family education and to maximize pt's level of independence in the home and community environment.     Pt's spiritual, cultural and educational needs considered and pt agreeable to plan of care and goals.     Anticipated barriers to physical therapy: none     Goals: updated 4/28/2023  Short Term Goals:  6 weeks  1.Report decreased right shoulder pain < / =  2/10  to increase tolerance for reaching behind back, tucking in shirt. (Progressing, not met)  2. Increase PROM of left shoulder INTERNAL ROTATION to 45 degrees for improved reaching behind back, tucking in shirt (Progressing, not met)  3. Increased " strength by 1/3 MMT grade in right shoulder ER to increase tolerance for ADL and work activities. (Progressing, not met)  4. Pt to tolerate HEP to improve ROM and independence with ADL's (Progressing, not met)     Long Term Goals: 12 weeks  1.Perform weighted ball circles on wall for 1 min each direction at 90 degrees for improved overhead endurance tasks (Progressing, not met)  2.Increase AROM to left shoulder INTERNAL ROTATION to T10 for improved functional movements (Progressing, not met)  3.Increase strength to >/= 5/5 in right shoulder EXTERNAL ROTATION, flexion, abduction to increase tolerance for ADL and work activities. (Progressing, not met)  4. Pt goal: return to golf with </= 1/10 pain in right shoulder  (Progressing, not met)  5. Pt will have improved gcode of 20% on FOTO shoulder in order to demonstrate true functional improvement.  (Progressing, not met)    Plan   Plan of care Certification: 4/17/2023 to 7/10/2023.     Continue per plan of care with focus on postural stability and R shoulder ROM.     Ke Lim III, PTA

## 2023-05-01 NOTE — PROGRESS NOTES
PT/PTA met face to face to discuss pt's treatment plan and progress towards established goals. Pt will be seen by a physical therapist minimally every 6th visit or every 30 days.    Ke Lim III, PTA

## 2023-05-08 ENCOUNTER — CLINICAL SUPPORT (OUTPATIENT)
Dept: REHABILITATION | Facility: OTHER | Age: 73
End: 2023-05-08
Payer: MEDICARE

## 2023-05-08 DIAGNOSIS — M25.611 DECREASED RANGE OF MOTION OF RIGHT SHOULDER: Primary | ICD-10-CM

## 2023-05-08 DIAGNOSIS — R53.1 DECREASED STRENGTH: ICD-10-CM

## 2023-05-08 PROCEDURE — 97110 THERAPEUTIC EXERCISES: CPT | Mod: PN,CQ

## 2023-05-08 PROCEDURE — 97112 NEUROMUSCULAR REEDUCATION: CPT | Mod: PN,CQ

## 2023-05-08 NOTE — PROGRESS NOTES
"[facility]   Physical Therapy Treatment Note      Name: Gerald Wilkinson  Clinic Number: [id number]    Therapy Diagnosis:   Encounter Diagnoses   Name Primary?    Decreased range of motion of right shoulder Yes    Decreased strength      Physician: Phil Tellez*    Visit Date: 5/8/2023    Physician Orders: PT Eval and Treat   Medical Diagnosis from Referral: M25.511,G89.29 (ICD-10-CM) - Chronic right shoulder pain   Evaluation Date: 4/17/2023  Authorization Period Expiration: 5/17/2023  Plan of Care Expiration: 7/10/2023  Progress Note Due: 5/15/2023  Visit # / Visits authorized: 4 (3/ 20)   FOTO: 1/3     Precautions: Standard     PTA Visit #: 2/5     Time In: 3:15 pm   Time Out: 3:59 pm  Total Billable Time: 44 minutes    Subjective   Pt reports: Shoulder feels fine today, only experiences soreness sometimes    He was compliant with home exercise program.  Response to previous treatment: "Sore the next day"  Functional change: some improvement to range    Pain: 0/10  Location: right anterolateral shoulder, neck musculature (UT)     Objective      Objective Measures updated at progress report unless specified.     Treatment     Gerald received the bolded treatments listed below:      therapeutic exercises to develop strength, endurance, ROM, flexibility, posture, and core stabilization for 23 minutes including:  IR strap stretch 5 x 10"  Supine pec stretch on half foam x 2'  Supine on half foam D2 flex 2 x 10  Supine on half foam hor abd thin orange monster band 2 x 10  Supine on half foam B shoulder ER thin orange monster band 2 x 10  Shoulder ER/IR walkout RTB x 10    Future visit:  robbery, lat pulls, flasher    manual therapy techniques: Joint mobilizations were applied to the: shoulder and TSP for 00 minutes, including:  Anterior and posterior glides to R shoulder  PA oscillatory mobilization to mid TSP  Lateral glides to CSP   MET for R lateral bending and rotation  Seated " "MET (iesha) to CTJ    neuromuscular re-education activities to improve: Proprioception and Posture for 21 minutes. The following activities were included:  Prone scap squeezes x 20  5" hold  Prone I's x 20  Prone T's x 20  Prone Y's x 20  +Rows RTB x 20  +Shoulder ext RTB x 20    therapeutic activities to improve functional performance for 00 minutes, including:  Subjective history and pt education regarding anatomy and role of therapy    Patient Education and Home Exercises       Education provided:   - Exercise form and rationale    Written Home Exercises Provided: Patient instructed to cont prior HEP. Exercises were reviewed and Gerald was able to demonstrate them prior to the end of the session.  Gerald demonstrated good  understanding of the education provided. See EMR under Patient Instructions for exercises provided during therapy sessions    Assessment   Pt completed treatment with no complaint of pain, but continues to have limited thoracic mobility and overhead shoulder range of motion in prone.    Gerald Is progressing well towards his goals.   Pt prognosis is Good.     Pt will continue to benefit from skilled outpatient physical therapy to address the deficits listed in the problem list box on initial evaluation, provide pt/family education and to maximize pt's level of independence in the home and community environment.     Pt's spiritual, cultural and educational needs considered and pt agreeable to plan of care and goals.     Anticipated barriers to physical therapy: none     Goals: updated 4/28/2023  Short Term Goals:  6 weeks  1.Report decreased right shoulder pain < / =  2/10  to increase tolerance for reaching behind back, tucking in shirt. (Progressing, not met)  2. Increase PROM of left shoulder INTERNAL ROTATION to 45 degrees for improved reaching behind back, tucking in shirt (Progressing, not met)  3. Increased strength by 1/3 MMT grade in right shoulder ER to increase tolerance for ADL and " work activities. (Progressing, not met)  4. Pt to tolerate HEP to improve ROM and independence with ADL's (Progressing, not met)     Long Term Goals: 12 weeks  1.Perform weighted ball circles on wall for 1 min each direction at 90 degrees for improved overhead endurance tasks (Progressing, not met)  2.Increase AROM to left shoulder INTERNAL ROTATION to T10 for improved functional movements (Progressing, not met)  3.Increase strength to >/= 5/5 in right shoulder EXTERNAL ROTATION, flexion, abduction to increase tolerance for ADL and work activities. (Progressing, not met)  4. Pt goal: return to golf with </= 1/10 pain in right shoulder  (Progressing, not met)  5. Pt will have improved gcode of 20% on FOTO shoulder in order to demonstrate true functional improvement.  (Progressing, not met)    Plan   Plan of care Certification: 4/17/2023 to 7/10/2023.     Continue per plan of care with focus on postural stability and R shoulder ROM.     Ke Lim III, PTA

## 2023-05-16 ENCOUNTER — PATIENT MESSAGE (OUTPATIENT)
Dept: PRIMARY CARE CLINIC | Facility: CLINIC | Age: 73
End: 2023-05-16
Payer: MEDICARE

## 2023-05-16 ENCOUNTER — CLINICAL SUPPORT (OUTPATIENT)
Dept: REHABILITATION | Facility: OTHER | Age: 73
End: 2023-05-16
Payer: MEDICARE

## 2023-05-16 DIAGNOSIS — M25.561 ACUTE PAIN OF RIGHT KNEE: Primary | ICD-10-CM

## 2023-05-16 DIAGNOSIS — M25.561 RIGHT MEDIAL KNEE PAIN: ICD-10-CM

## 2023-05-16 DIAGNOSIS — R53.1 DECREASED STRENGTH: ICD-10-CM

## 2023-05-16 DIAGNOSIS — M25.611 DECREASED RANGE OF MOTION OF RIGHT SHOULDER: Primary | ICD-10-CM

## 2023-05-16 PROCEDURE — 97530 THERAPEUTIC ACTIVITIES: CPT | Mod: PN | Performed by: PHYSICAL THERAPIST

## 2023-05-16 NOTE — PROGRESS NOTES
"[facility]   Physical Therapy Treatment Note      Name: Gerald Wilkinson  Clinic Number: [id number]    Therapy Diagnosis:   Encounter Diagnoses   Name Primary?    Decreased range of motion of right shoulder Yes    Decreased strength      Physician: Phil Tellez*    Visit Date: 5/16/2023    Physician Orders: PT Eval and Treat   Medical Diagnosis from Referral: M25.511,G89.29 (ICD-10-CM) - Chronic right shoulder pain   Evaluation Date: 4/17/2023  Authorization Period Expiration: 5/17/2023  Plan of Care Expiration: 7/10/2023  Progress Note Due: 6/15/2023   Visit # / Visits authorized: 5 / 21   FOTO: 3/3 shoulder:  add knee foto at next visit     Precautions: Standard     PTA Visit #: 0/5     Time In: 1630   Time Out: 1715  Total Billable Time: 45 minutes    Subjective   Pt reports: his shoulder is feeling much better, and he feels ready to finish treatment with it. He says he was able to reach in his back seat of his truck and grab an umbrella, which he hasn't been able to do for 2 years. He reports pain to medial R knee that he's interested in doing therapy for. Pain has come and gone with different things, but got aggravated after hitting balls at the driving range in December. Pain seems to be worse with pivoting activities. Typically to medial knee (pes anserine), but has had some pain to posterior knee as well. Pain currently 1/10, 6/10 at worst, 0/10 at best. Denies falls.     He was compliant with home exercise program.  Response to previous treatment: "Sore the next day"  Functional change: some improvement to range    Pain: 0/10  Location: right anterolateral shoulder, neck musculature (UT)     Objective      Objective Measures updated at progress report unless specified.   5/16/2023    PROM  L shoulder IR 70 deg    MMT  B shoulder ER 4+/5          CMS Impairment/Limitation/Restriction for FOTO Shoulder Survey    Therapist reviewed FOTO scores for Gerald Wilkinson on " "5/16/2023  FOTO documents entered into [employer] - see Media section.    Evaluation: 30%    Current : 16%    Goal: 27%         5/16/2023 Knee screen:    Observation: mild swelling noted to medial R knee compared to L        Range of Motion:   Knee Left active Left Passive Right Active R passive   Flexion 130 135 115 120   Extension 0 +5 +5 0           Lower Extremity Strength  Right LE  Left LE    Ankle dorsiflexion: 5/5 Ankle dorsiflexion: 5/5   Ankle plantarflexion: 5/5 Ankle plantarflexion: 5/5   Knee extension: 4+/5 Knee extension: 5/5   Knee flexion: 4/5 Knee flexion: 4+/5   Hip flexion: 4+/5 Hip flexion: 5/5   Hip external rotation 4/5 Hip external rotation 4+/5   Hip internal rotation 4/5 Hip internal rotation 4+/5   Hip abduction:  4/5 Hip abduction: 4+/5   Hip adduction: 5/5 Hip adduction: 5/5   Hip extension: 4-/5 Hip extension 4-/5           Special Tests:   Left Right   Valgus Stress Test - -   Varus Stress test - -   Lachman's test - -   Posterior Lachman - -   Thessaly's Test - -         Function:    - SLS R: WFL  - SLS L: WFL    Joint Mobility: hypomobile R Patella    Palpation: tenderness to R pes anserine      Flexibility:    Hamstring: R = slight; L = slight   Quad: R = mod; L = mild           Treatment     Gerald received the bolded treatments listed below:      therapeutic exercises to develop strength, endurance, ROM, flexibility, posture, and core stabilization for 00 minutes including:  IR strap stretch 5 x 10"  Supine pec stretch on half foam x 2'  Supine on half foam D2 flex 2 x 10  Supine on half foam hor abd thin orange monster band 2 x 10  Supine on half foam B shoulder ER thin orange monster band 2 x 10  Shoulder ER/IR walkout RTB x 10    Future visit:  robbery, lat pulls, flasher    manual therapy techniques: Joint mobilizations were applied to the: shoulder and TSP for 5 minutes, including:    Preparation and application of  Ktape to R knee. I strip to medial knee over pes " "anserine  Anterior and posterior glides to R shoulder  PA oscillatory mobilization to mid TSP  Lateral glides to CSP   MET for R lateral bending and rotation  Seated MET (iesha) to CTJ    neuromuscular re-education activities to improve: Proprioception and Posture for 00 minutes. The following activities were included:  Prone scap squeezes x 20  5" hold  Prone I's x 20  Prone T's x 20  Prone Y's x 20  +Rows RTB x 20  +Shoulder ext RTB x 20    therapeutic activities to improve functional performance for 40 minutes, including:  Reassessment of shoulder, assessment/screen of knee pain    Patient Education and Home Exercises       Education provided:   - Exercise form and rationale    Written Home Exercises Provided: Patient instructed to cont prior HEP. Exercises were reviewed and Gerald was able to demonstrate them prior to the end of the session.  Gerald demonstrated good  understanding of the education provided. See EMR under Patient Instructions for exercises provided during therapy sessions    Assessment   Pt reports resolution of R shoulder pain, and has met goals regarding shoulder. Per pt request, assessment performed today for complaint of pain to R medial knee. Tenderness noted to R pes anserine consistent with bursitis. Limited R knee ROM to flexion and extension as above. Weakness to B LE with MMT. Quad flexibility limited R compared to L. No significant special test findings. Pt will benefit from transition to address c/o knee pain to improve function in home, community, and work settings, and to facilitate return to playing golf. Recommend continuation of current plan of care with transition to address knee pain.     Gerald Is progressing well towards his goals.   Pt prognosis is Good.     Pt will continue to benefit from skilled outpatient physical therapy to address the deficits listed in the problem list box on initial evaluation, provide pt/family education and to maximize pt's level of independence " in the home and community environment.     Pt's spiritual, cultural and educational needs considered and pt agreeable to plan of care and goals.     Anticipated barriers to physical therapy: none     Goals: updated 4/28/2023  Short Term Goals:  6 weeks  1.Report decreased right shoulder pain < / =  2/10  to increase tolerance for reaching behind back, tucking in shirt. (met)  2. Increase PROM of left shoulder INTERNAL ROTATION to 45 degrees for improved reaching behind back, tucking in shirt (met)  3. Increased strength by 1/3 MMT grade in right shoulder ER to increase tolerance for ADL and work activities. (met)  4. Pt to tolerate HEP to improve ROM and independence with ADL's (met)  5. Pt will demonstrate improvements in R knee ROM to 0-120-125 for ease with ambulation  6. Pt will report <4/10 pain at worst within the R knee for ease with ADL's     Long Term Goals: 12 weeks  1.Perform weighted ball circles on wall for 1 min each direction at 90 degrees for improved overhead endurance tasks (met)  2.Increase AROM to left shoulder INTERNAL ROTATION to T10 for improved functional movements (met)  3.Increase strength to >/= 5/5 in right shoulder EXTERNAL ROTATION, flexion, abduction to increase tolerance for ADL and work activities. (met)  4. Pt goal: return to golf with </= 1/10 pain in right shoulder and knee (Progressing, not met)  5. Pt will have improved gcode of 20% on FOTO shoulder in order to demonstrate true functional improvement.  (met)  6. Pt will demonstrate improvements in R knee ROM to 0-130-135 for ease with ambulation and household chores.   7. Pt will demonstrate 4+/5 strength within B LE for ease with house hold chores and climbing stairs          Plan   Plan of care Certification: 4/17/2023 to 7/10/2023.     Continue per plan of care with focus on postural stability and R shoulder ROM.     Elsa Baker, PT

## 2023-05-17 NOTE — PLAN OF CARE
[facility]  Physical Therapy Plan of Care Note     Name: Gerald Wilkinson  Clinic Number: [id number]    Therapy Diagnosis:   Encounter Diagnoses   Name Primary?    Decreased range of motion of right shoulder Yes    Decreased strength      Physician: Phil Tellez*    Visit Date: 5/16/2023    Physician Orders: PT Eval and Treat   Medical Diagnosis from Referral: M25.511,G89.29 (ICD-10-CM) - Chronic right shoulder pain   Evaluation Date: 4/17/2023  Authorization Period Expiration: 5/17/2023  Plan of Care Expiration: 7/10/2023  Progress Note Due: 6/15/2023   Visit # / Visits authorized: 5 / 21   FOTO: 3/3 shoulder:  add knee foto at next visit     Precautions: Standard     Functional Level Prior to Evaluation:  I with all ADL's and very active including golf and heavy chores 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 He says he was able to reach in his back seat of his truck and grab an umbrella, which he hasn't been able to do for 2 years. He reports pain to medial R knee that he's interested in doing therapy for. Pain has come and gone with different things, but got aggravated after hitting balls at the driving range in December. Pain seems to be worse with pivoting activities. Typically to medial knee (pes anserine), but has had some pain to posterior knee as well. Pain currently 1/10, 6/10 at worst, 0/10 at best. Denies falls.     OBJECTIVE     Update: 5/16/2023    PROM  L shoulder IR 70 deg    MMT  B shoulder ER 4+/5          CMS Impairment/Limitation/Restriction for FOTO Shoulder Survey    Therapist reviewed FOTO scores for Gerald Wilkinson on 5/16/2023  FOTO documents entered into [employer] - see Media section.    Evaluation: 30%    Current : 16%    Goal: 27%         5/16/2023 Knee screen:    Observation: mild swelling noted to medial  R knee compared to L        Range of Motion:   Knee Left active Left Passive Right Active R passive   Flexion 130 135 115 120   Extension 0 +5 +5 0           Lower Extremity Strength  Right LE  Left LE    Ankle dorsiflexion: 5/5 Ankle dorsiflexion: 5/5   Ankle plantarflexion: 5/5 Ankle plantarflexion: 5/5   Knee extension: 4+/5 Knee extension: 5/5   Knee flexion: 4/5 Knee flexion: 4+/5   Hip flexion: 4+/5 Hip flexion: 5/5   Hip external rotation 4/5 Hip external rotation 4+/5   Hip internal rotation 4/5 Hip internal rotation 4+/5   Hip abduction:  4/5 Hip abduction: 4+/5   Hip adduction: 5/5 Hip adduction: 5/5   Hip extension: 4-/5 Hip extension 4-/5           Special Tests:   Left Right   Valgus Stress Test - -   Varus Stress test - -   Lachman's test - -   Posterior Lachman - -   Thessaly's Test - -         Function:    - SLS R: WFL  - SLS L: WFL    Joint Mobility: hypomobile R Patella    Palpation: tenderness to R pes anserine      Flexibility:    Hamstring: R = slight; L = slight   Quad: R = mod; L = mild       ASSESSMENT     Update: Pt reports resolution of R shoulder pain, and has met goals regarding shoulder. Per pt request, assessment performed today for complaint of pain to R medial knee. Tenderness noted to R pes anserine consistent with bursitis. Limited R knee ROM to flexion and extension as above. Weakness to B LE with MMT. Quad flexibility limited R compared to L. No significant special test findings. Pt will benefit from transition to address c/o knee pain to improve function in home, community, and work settings, and to facilitate return to playing golf. Recommend continuation of current plan of care with transition to address knee pain.     Previous Short Term Goals Status:   shoulder goals met  New Short Term Goals Status:   goals for knee added below  Long Term Goal Status: modified:  to address knee pain  Reasons for Recertification of Therapy:   transition to address c/o R knee  pain    GOALS  Short Term Goals:  6 weeks  1.Report decreased right shoulder pain < / =  2/10  to increase tolerance for reaching behind back, tucking in shirt. (met)  2. Increase PROM of left shoulder INTERNAL ROTATION to 45 degrees for improved reaching behind back, tucking in shirt (met)  3. Increased strength by 1/3 MMT grade in right shoulder ER to increase tolerance for ADL and work activities. (met)  4. Pt to tolerate HEP to improve ROM and independence with ADL's (met)  5. Pt will demonstrate improvements in R knee ROM to 0-120-125 for ease with ambulation  6. Pt will report <4/10 pain at worst within the R knee for ease with ADL's     Long Term Goals: 12 weeks  1.Perform weighted ball circles on wall for 1 min each direction at 90 degrees for improved overhead endurance tasks (met)  2.Increase AROM to left shoulder INTERNAL ROTATION to T10 for improved functional movements (met)  3.Increase strength to >/= 5/5 in right shoulder EXTERNAL ROTATION, flexion, abduction to increase tolerance for ADL and work activities. (met)  4. Pt goal: return to golf with </= 1/10 pain in right shoulder and knee (Progressing, not met)  5. Pt will have improved gcode of 20% on FOTO shoulder in order to demonstrate true functional improvement.  (met)  6. Pt will demonstrate improvements in R knee ROM to 0-130-135 for ease with ambulation and household chores.   7. Pt will demonstrate 4+/5 strength within B LE for ease with house hold chores and climbing stairs    PLAN     Updated Certification Period: 4/17/2023 to 7/10/2023   Recommended Treatment Plan: 2 times per week for 12 weeks:  Gait Training, Iontophoresis (with dexamethasone), Manual Therapy, Moist Heat/ Ice, Neuromuscular Re-ed, Patient Education, Therapeutic Activities, and Therapeutic Exercise  Other Recommendations: n/a    Elsa Baker, PT

## 2023-05-21 NOTE — PROGRESS NOTES
"[facility]   Physical Therapy Treatment Note      Name: Gerald Wilkinson  Clinic Number: [id number]    Therapy Diagnosis:   Encounter Diagnoses   Name Primary?    Decreased range of motion of right shoulder Yes    Decreased strength     Right medial knee pain      Physician: Phil Tellez*    Visit Date: 5/22/2023    Physician Orders: PT Eval and Treat   Medical Diagnosis from Referral: M25.511,G89.29 (ICD-10-CM) - Chronic right shoulder pain   Evaluation Date: 4/17/2023  Authorization Period Expiration: 12/31/2023  Plan of Care Expiration: 7/10/2023  Progress Note Due: 6/15/2023   Visit # / Visits authorized: 6 / 21   FOTO: 3/3 shoulder  (closed)  FOTO: 1/3 knee      Precautions: Standard     PTA Visit #: 1/5     Time In: 3:21 pm  Time Out: 4:10 pm  Total Billable Time: 40 minutes    Subjective   Pt reports: No pain in knee today, but says that when he does have pain, he can usually rub it or apply pressure and it will go away    He was compliant with home exercise program.  Response to previous treatment: "My knee was sore"  Functional change: some improvement to range    Pain: 0/10  Location: R medial knee    Objective      Objective Measures updated at progress report unless specified.   5/16/2023    PROM  L shoulder IR 70 deg    MMT  B shoulder ER 4+/5    CMS Impairment/Limitation/Restriction for FOTO Shoulder Survey    Therapist reviewed FOTO scores for Gerald Wilkinson on 5/16/2023  FOTO documents entered into [employer] - see Media section.    Evaluation: 30%    Current : 16%    Goal: 27%       5/16/2023 Knee screen:    Observation: mild swelling noted to medial R knee compared to L    Range of Motion:   Knee Left active Left Passive Right Active R passive   Flexion 130 135 115 120   Extension 0 +5 +5 0     Lower Extremity Strength  Right LE  Left LE    Ankle dorsiflexion: 5/5 Ankle dorsiflexion: 5/5   Ankle plantarflexion: 5/5 Ankle plantarflexion: 5/5   Knee extension: 4+/5 Knee " "extension: 5/5   Knee flexion: 4/5 Knee flexion: 4+/5   Hip flexion: 4+/5 Hip flexion: 5/5   Hip external rotation 4/5 Hip external rotation 4+/5   Hip internal rotation 4/5 Hip internal rotation 4+/5   Hip abduction:  4/5 Hip abduction: 4+/5   Hip adduction: 5/5 Hip adduction: 5/5   Hip extension: 4-/5 Hip extension 4-/5     Function:    - SLS R: WFL  - SLS L: WFL    Joint Mobility: hypomobile R Patella    Palpation: tenderness to R pes anserine      Flexibility:    Hamstring: R = slight; L = slight   Quad: R = mod; L = mild     Treatment     Gerald received the bolded treatments listed below:      therapeutic exercises to develop strength, endurance, ROM, flexibility, posture, and core stabilization for 10 minutes including:  +Upright bike x 5'  (Seat level 5; Resistance level 2)  +LAQ 2# x 20  +Heel raises x 30    IR strap stretch 5 x 10"  Supine pec stretch on half foam x 2'  Supine on half foam D2 flex 2 x 10  Supine on half foam hor abd thin orange monster band 2 x 10  Supine on half foam B shoulder ER thin orange monster band 2 x 10  Shoulder ER/IR walkout RTB x 10    Future visit:  robbery, lat pulls, flasher    manual therapy techniques: Joint mobilizations were applied to the: shoulder and TSP for 00 minutes, including:  Preparation and application of  Ktape to R knee. I strip to medial knee over pes anserine  Anterior and posterior glides to R shoulder  PA oscillatory mobilization to mid TSP  Lateral glides to CSP   MET for R lateral bending and rotation  Seated MET (iesha) to CTJ    neuromuscular re-education activities to improve: Proprioception and Posture for 00 minutes. The following activities were included:  Prone scap squeezes x 20  5" hold  Prone I's x 20  Prone T's x 20  Prone Y's x 20  +Rows RTB x 20  +Shoulder ext RTB x 20    therapeutic activities to improve functional performance for 30 minutes, including:  +Shuttle DL 2 black bands x 20  +Shuttle SL 1 black band x 20 R  +Lateral walking RTT " 30ft x 2  +Hesitation lisandro berger 10# KB 30ft x 2    Patient Education and Home Exercises       Education provided:   - Exercise form and rationale    Written Home Exercises Provided: Patient instructed to cont prior HEP. Exercises were reviewed and Gerald was able to demonstrate them prior to the end of the session.  Gerald demonstrated good  understanding of the education provided. See EMR under Patient Instructions for exercises provided during therapy sessions    Assessment   Pt tolerated treatment well with no complaints of pain, but noted the beginning of a cramping sensation in R posterior knee with knee flexion. Pt displayed limited R knee extension. Will continue to improve knee ROM and strength.     Gerald Is progressing well towards his goals.   Pt prognosis is Good.     Pt will continue to benefit from skilled outpatient physical therapy to address the deficits listed in the problem list box on initial evaluation, provide pt/family education and to maximize pt's level of independence in the home and community environment.     Pt's spiritual, cultural and educational needs considered and pt agreeable to plan of care and goals.     Anticipated barriers to physical therapy: none     Goals: updated 4/28/2023  Short Term Goals:  6 weeks  1.Report decreased right shoulder pain < / =  2/10  to increase tolerance for reaching behind back, tucking in shirt. (met)  2. Increase PROM of left shoulder INTERNAL ROTATION to 45 degrees for improved reaching behind back, tucking in shirt (met)  3. Increased strength by 1/3 MMT grade in right shoulder ER to increase tolerance for ADL and work activities. (met)  4. Pt to tolerate HEP to improve ROM and independence with ADL's (met)  5. Pt will demonstrate improvements in R knee ROM to 0-120-125 for ease with ambulation  6. Pt will report <4/10 pain at worst within the R knee for ease with ADL's     Long Term Goals: 12 weeks  1.Perform weighted ball circles on wall for 1  min each direction at 90 degrees for improved overhead endurance tasks (met)  2.Increase AROM to left shoulder INTERNAL ROTATION to T10 for improved functional movements (met)  3.Increase strength to >/= 5/5 in right shoulder EXTERNAL ROTATION, flexion, abduction to increase tolerance for ADL and work activities. (met)  4. Pt goal: return to golf with </= 1/10 pain in right shoulder and knee (Progressing, not met)  5. Pt will have improved gcode of 20% on FOTO shoulder in order to demonstrate true functional improvement.  (met)  6. Pt will demonstrate improvements in R knee ROM to 0-130-135 for ease with ambulation and household chores.   7. Pt will demonstrate 4+/5 strength within B LE for ease with house hold chores and climbing stairs    Plan   Plan of care Certification: 4/17/2023 to 7/10/2023.     Improve R knee ROM and strength    Ke Lim III, PTA

## 2023-05-22 ENCOUNTER — CLINICAL SUPPORT (OUTPATIENT)
Dept: REHABILITATION | Facility: OTHER | Age: 73
End: 2023-05-22
Payer: MEDICARE

## 2023-05-22 DIAGNOSIS — M25.561 RIGHT MEDIAL KNEE PAIN: ICD-10-CM

## 2023-05-22 DIAGNOSIS — M25.561 ACUTE PAIN OF RIGHT KNEE: ICD-10-CM

## 2023-05-22 DIAGNOSIS — R53.1 DECREASED STRENGTH: ICD-10-CM

## 2023-05-22 DIAGNOSIS — M25.611 DECREASED RANGE OF MOTION OF RIGHT SHOULDER: Primary | ICD-10-CM

## 2023-05-22 PROCEDURE — 97110 THERAPEUTIC EXERCISES: CPT | Mod: PN,CQ

## 2023-05-22 PROCEDURE — 97530 THERAPEUTIC ACTIVITIES: CPT | Mod: PN,CQ

## 2023-05-29 ENCOUNTER — CLINICAL SUPPORT (OUTPATIENT)
Dept: REHABILITATION | Facility: OTHER | Age: 73
End: 2023-05-29
Payer: MEDICARE

## 2023-05-29 DIAGNOSIS — M25.561 RIGHT MEDIAL KNEE PAIN: ICD-10-CM

## 2023-05-29 DIAGNOSIS — R53.1 DECREASED STRENGTH: ICD-10-CM

## 2023-05-29 DIAGNOSIS — M25.611 DECREASED RANGE OF MOTION OF RIGHT SHOULDER: Primary | ICD-10-CM

## 2023-05-29 PROCEDURE — 97140 MANUAL THERAPY 1/> REGIONS: CPT | Mod: PN

## 2023-05-29 PROCEDURE — 97530 THERAPEUTIC ACTIVITIES: CPT | Mod: PN

## 2023-05-29 PROCEDURE — 97110 THERAPEUTIC EXERCISES: CPT | Mod: PN

## 2023-05-29 NOTE — PROGRESS NOTES
"[facility]   Physical Therapy Treatment Note      Name: Gerald Wilkinson  Clinic Number: [id number]    Therapy Diagnosis:   Encounter Diagnoses   Name Primary?    Decreased range of motion of right shoulder Yes    Decreased strength     Right medial knee pain        Physician: Phil Tellez*    Visit Date: 5/29/2023    Physician Orders: PT Eval and Treat   Medical Diagnosis from Referral: M25.511,G89.29 (ICD-10-CM) - Chronic right shoulder pain   Evaluation Date: 4/17/2023  Authorization Period Expiration: 12/31/2023  Plan of Care Expiration: 7/10/2023  Progress Note Due: 6/15/2023   Visit # / Visits authorized:7 / 21   FOTO: 3/3 shoulder  (closed)  FOTO: 2/3 knee      Precautions: Standard     PTA Visit #: 0/5     Time In: 2:25 pm  Time Out: 3:12 pm  Total Billable Time: 42 minutes    Subjective   Pt reports: his knee feels good, but if he turn the wrong way he can have increased pain in his knee.  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  Response to previous treatment: "It was fine."  Functional change: nothing reported for the knee. Can reach to get something out the backseat of his car without pain    Pain: 0/10  Location: R medial knee    Objective      Objective Measures updated at progress report unless specified.   5/16/2023    PROM  L shoulder IR 70 deg    MMT  B shoulder ER 4+/5    CMS Impairment/Limitation/Restriction for FOTO Shoulder Survey    Therapist reviewed FOTO scores for Gerald Wilkinson on 5/16/2023  FOTO documents entered into [employer] - see Media section.    Evaluation: 30%    Current : 16%    Goal: 27%       5/16/2023 Knee screen:    Observation: mild swelling noted to medial R knee compared to L    Range of Motion:   Knee Left active Left Passive Right Active R passive   Flexion 130 135 115 120   Extension 0 +5 +5 0     Lower Extremity Strength  Right LE  Left LE " "   Ankle dorsiflexion: 5/5 Ankle dorsiflexion: 5/5   Ankle plantarflexion: 5/5 Ankle plantarflexion: 5/5   Knee extension: 4+/5 Knee extension: 5/5   Knee flexion: 4/5 Knee flexion: 4+/5   Hip flexion: 4+/5 Hip flexion: 5/5   Hip external rotation 4/5 Hip external rotation 4+/5   Hip internal rotation 4/5 Hip internal rotation 4+/5   Hip abduction:  4/5 Hip abduction: 4+/5   Hip adduction: 5/5 Hip adduction: 5/5   Hip extension: 4-/5 Hip extension 4-/5     Function:    - SLS R: WFL  - SLS L: WFL    Joint Mobility: hypomobile R Patella    Palpation: tenderness to R pes anserine      Flexibility:    Hamstring: R = slight; L = slight   Quad: R = mod; L = mild     Treatment     Gerald received the bolded treatments listed below:      therapeutic exercises to develop strength, endurance, ROM, flexibility, posture, and core stabilization for 17 minutes including:  Upright bike x 5'  (Seat level 5; Resistance level 2)    +side lying hip abd 2 x 10 reps    LAQ 2# x 20  Heel raises x 30    IR strap stretch 5 x 10"  Supine pec stretch on half foam x 2'  Supine on half foam D2 flex 2 x 10  Supine on half foam hor abd thin orange monster band 2 x 10  Supine on half foam B shoulder ER thin orange monster band 2 x 10  Shoulder ER/IR walkout RTB x 10    Future visit:  robbery, lat pulls, flasher    manual therapy techniques: Joint mobilizations were applied to the: shoulder and TSP for 10 minutes, including:  The stick stm to R IT band  STM medial hamstrings    Preparation and application of  Ktape to R knee. I strip to medial knee over pes anserine  Anterior and posterior glides to R shoulder  PA oscillatory mobilization to mid TSP  Lateral glides to CSP   MET for R lateral bending and rotation  Seated MET (iesha) to CTJ    neuromuscular re-education activities to improve: Proprioception and Posture for 00 minutes. The following activities were included:  Prone scap squeezes x 20  5" hold  Prone I's x 20  Prone T's x 20  Prone " "Y's x 20  +Rows RTB x 20  +Shoulder ext RTB x 20    therapeutic activities to improve functional performance for 15 minutes, including:  Shuttle DL 2 black bands x 20  Shuttle SL 1 black band x 20 R  Lateral walking RTT 30ft x 2  Hesitation marches uni 10# KB 30ft x 2    Forward step ups 20 reps on 4" step  Forward step downs 2 x 10 reps on 4" step    Patient Education and Home Exercises       Education provided:   - Exercise form and rationale    Written Home Exercises Provided: Patient instructed to cont prior HEP. Exercises were reviewed and Gerald was able to demonstrate them prior to the end of the session.  Greald demonstrated good  understanding of the education provided. See EMR under Patient Instructions for exercises provided during therapy sessions    Assessment   Increased muscle tension and tenderness in medial hamstrings and in pes anserine area.    Gerald Is progressing well towards his goals.   Pt prognosis is Good.     Pt will continue to benefit from skilled outpatient physical therapy to address the deficits listed in the problem list box on initial evaluation, provide pt/family education and to maximize pt's level of independence in the home and community environment.     Pt's spiritual, cultural and educational needs considered and pt agreeable to plan of care and goals.     Anticipated barriers to physical therapy: none     Goals: updated 4/28/2023  Short Term Goals:  6 weeks  1.Report decreased right shoulder pain < / =  2/10  to increase tolerance for reaching behind back, tucking in shirt. (met)  2. Increase PROM of left shoulder INTERNAL ROTATION to 45 degrees for improved reaching behind back, tucking in shirt (met)  3. Increased strength by 1/3 MMT grade in right shoulder ER to increase tolerance for ADL and work activities. (met)  4. Pt to tolerate HEP to improve ROM and independence with ADL's (met)  5. Pt will demonstrate improvements in R knee ROM to 0-120-125 for ease with " ambulation  6. Pt will report <4/10 pain at worst within the R knee for ease with ADL's     Long Term Goals: 12 weeks  1.Perform weighted ball circles on wall for 1 min each direction at 90 degrees for improved overhead endurance tasks (met)  2.Increase AROM to left shoulder INTERNAL ROTATION to T10 for improved functional movements (met)  3.Increase strength to >/= 5/5 in right shoulder EXTERNAL ROTATION, flexion, abduction to increase tolerance for ADL and work activities. (met)  4. Pt goal: return to golf with </= 1/10 pain in right shoulder and knee (Progressing, not met)  5. Pt will have improved gcode of 20% on FOTO shoulder in order to demonstrate true functional improvement.  (met)  6. Pt will demonstrate improvements in R knee ROM to 0-130-135 for ease with ambulation and household chores.   7. Pt will demonstrate 4+/5 strength within B LE for ease with house hold chores and climbing stairs    Plan   Plan of care Certification: 4/17/2023 to 7/10/2023.     Improve R knee ROM and strength    Jeancarlos Oliver, PT

## 2023-06-01 ENCOUNTER — CLINICAL SUPPORT (OUTPATIENT)
Dept: REHABILITATION | Facility: OTHER | Age: 73
End: 2023-06-01
Payer: MEDICARE

## 2023-06-01 DIAGNOSIS — M25.561 RIGHT MEDIAL KNEE PAIN: ICD-10-CM

## 2023-06-01 DIAGNOSIS — R53.1 DECREASED STRENGTH: ICD-10-CM

## 2023-06-01 DIAGNOSIS — M25.611 DECREASED RANGE OF MOTION OF RIGHT SHOULDER: Primary | ICD-10-CM

## 2023-06-01 PROCEDURE — 97110 THERAPEUTIC EXERCISES: CPT | Mod: PN

## 2023-06-01 PROCEDURE — 97140 MANUAL THERAPY 1/> REGIONS: CPT | Mod: PN

## 2023-06-01 PROCEDURE — 97530 THERAPEUTIC ACTIVITIES: CPT | Mod: PN

## 2023-06-01 NOTE — PROGRESS NOTES
"[facility]   Physical Therapy Treatment Note      Name: Gerald Wilkinson  Clinic Number: [id number]    Therapy Diagnosis:   Encounter Diagnoses   Name Primary?    Decreased range of motion of right shoulder Yes    Decreased strength     Right medial knee pain        Physician: Phil Tellez*    Visit Date: 6/1/2023    Physician Orders: PT Eval and Treat   Medical Diagnosis from Referral: M25.511,G89.29 (ICD-10-CM) - Chronic right shoulder pain   Evaluation Date: 4/17/2023  Authorization Period Expiration: 12/31/2023  Plan of Care Expiration: 7/10/2023  Progress Note Due: 6/15/2023   Visit # / Visits authorized:7 / 21   FOTO: 3/3 shoulder  (closed)  FOTO: 2/3 knee      Precautions: Standard     PTA Visit #: 0/5     Time In: 4:30 pm  Time Out: 5:15 pm  Total Billable Time: 45 minutes    Subjective   Pt reports: no new complaints since previous visit.    He was compliant with home exercise program.  Response to previous treatment: "It was fine."  Functional change: nothing reported for the knee. Can reach to get something out the backseat of his car without pain    Pain: 0/10  Location: R medial knee    Objective      Objective Measures updated at progress report unless specified.   5/16/2023    PROM  L shoulder IR 70 deg    MMT  B shoulder ER 4+/5    CMS Impairment/Limitation/Restriction for FOTO Shoulder Survey    Therapist reviewed FOTO scores for Gerald Wilkinson on 5/16/2023  FOTO documents entered into [employer] - see Media section.    Evaluation: 30%    Current : 16%    Goal: 27%       5/16/2023 Knee screen:    Observation: mild swelling noted to medial R knee compared to L    Range of Motion:   Knee Left active Left Passive Right Active R passive   Flexion 130 135 115 120   Extension 0 +5 +5 0     Lower Extremity Strength  Right LE  Left LE    Ankle dorsiflexion: 5/5 Ankle dorsiflexion: 5/5   Ankle plantarflexion: 5/5 Ankle plantarflexion: 5/5   Knee extension: 4+/5 Knee extension: " "5/5   Knee flexion: 4/5 Knee flexion: 4+/5   Hip flexion: 4+/5 Hip flexion: 5/5   Hip external rotation 4/5 Hip external rotation 4+/5   Hip internal rotation 4/5 Hip internal rotation 4+/5   Hip abduction:  4/5 Hip abduction: 4+/5   Hip adduction: 5/5 Hip adduction: 5/5   Hip extension: 4-/5 Hip extension 4-/5     Function:    - SLS R: WFL  - SLS L: WFL    Joint Mobility: hypomobile R Patella    Palpation: tenderness to R pes anserine      Flexibility:    Hamstring: R = slight; L = slight   Quad: R = mod; L = mild     Treatment     Gerald received the bolded treatments listed below:      therapeutic exercises to develop strength, endurance, ROM, flexibility, posture, and core stabilization for 15 minutes including:  Upright bike x 5'  (Seat level 5; Resistance level 2)    LAQ with ball squeeze 3 x 10 x 3#  side lying hip abd 10 x 10 sec holds  +elevated clam 10 x 10" holds    Heel raises x 30    IR strap stretch 5 x 10"  Supine pec stretch on half foam x 2'  Supine on half foam D2 flex 2 x 10  Supine on half foam hor abd thin orange monster band 2 x 10  Supine on half foam B shoulder ER thin orange monster band 2 x 10  Shoulder ER/IR walkout RTB x 10    Future visit:  robbery, lat pulls, flasher    manual therapy techniques: Joint mobilizations were applied to the: shoulder and TSP for 10 minutes, including:  The stick stm to R IT band  STM medial hamstrings    Preparation and application of  Ktape to R knee. I strip to medial knee over pes anserine  Anterior and posterior glides to R shoulder  PA oscillatory mobilization to mid TSP  Lateral glides to CSP   MET for R lateral bending and rotation  Seated MET (gennarayan) to CTJ    neuromuscular re-education activities to improve: Proprioception and Posture for 00 minutes. The following activities were included:  Prone scap squeezes x 20  5" hold  Prone I's x 20  Prone T's x 20  Prone Y's x 20  +Rows RTB x 20  +Shoulder ext RTB x 20    therapeutic activities to improve " "functional performance for 20 minutes, including:  Shuttle DL 2 black1/red bands x 20   Shuttle SL 1 black/1 red band x 20 R  Lateral walking RTT 40ft x 2  +sumo walk RTT 40 ft x 2  Hesitation marches uni 10# KB 40ft x 2    Forward step ups 20 reps on 4" step  Forward step downs 2 x 10 reps on 4" step    Patient Education and Home Exercises       Education provided:   - Exercise form and rationale    Written Home Exercises Provided: Patient instructed to cont prior HEP. Exercises were reviewed and Gerald was able to demonstrate them prior to the end of the session.  Gerald demonstrated good  understanding of the education provided. See EMR under Patient Instructions for exercises provided during therapy sessions    Assessment     Progressed lateral hip strengthening with elevated clams and sumo walks to promote dynamic stabilization and reduce tension on medial leg. Good tolerance with appropriate training effect noted.  Gerald Is progressing well towards his goals.   Pt prognosis is Good.     Pt will continue to benefit from skilled outpatient physical therapy to address the deficits listed in the problem list box on initial evaluation, provide pt/family education and to maximize pt's level of independence in the home and community environment.     Pt's spiritual, cultural and educational needs considered and pt agreeable to plan of care and goals.     Anticipated barriers to physical therapy: none     Goals: updated 4/28/2023  Short Term Goals:  6 weeks  1.Report decreased right shoulder pain < / =  2/10  to increase tolerance for reaching behind back, tucking in shirt. (met)  2. Increase PROM of left shoulder INTERNAL ROTATION to 45 degrees for improved reaching behind back, tucking in shirt (met)  3. Increased strength by 1/3 MMT grade in right shoulder ER to increase tolerance for ADL and work activities. (met)  4. Pt to tolerate HEP to improve ROM and independence with ADL's (met)  5. Pt will demonstrate " improvements in R knee ROM to 0-120-125 for ease with ambulation  6. Pt will report <4/10 pain at worst within the R knee for ease with ADL's     Long Term Goals: 12 weeks  1.Perform weighted ball circles on wall for 1 min each direction at 90 degrees for improved overhead endurance tasks (met)  2.Increase AROM to left shoulder INTERNAL ROTATION to T10 for improved functional movements (met)  3.Increase strength to >/= 5/5 in right shoulder EXTERNAL ROTATION, flexion, abduction to increase tolerance for ADL and work activities. (met)  4. Pt goal: return to golf with </= 1/10 pain in right shoulder and knee (Progressing, not met)  5. Pt will have improved gcode of 20% on FOTO shoulder in order to demonstrate true functional improvement.  (met)  6. Pt will demonstrate improvements in R knee ROM to 0-130-135 for ease with ambulation and household chores.   7. Pt will demonstrate 4+/5 strength within B LE for ease with house hold chores and climbing stairs    Plan   Plan of care Certification: 4/17/2023 to 7/10/2023.     Improve R knee ROM and strength    Luz Swartz, PT

## 2023-06-05 ENCOUNTER — CLINICAL SUPPORT (OUTPATIENT)
Dept: REHABILITATION | Facility: OTHER | Age: 73
End: 2023-06-05
Payer: MEDICARE

## 2023-06-05 DIAGNOSIS — R53.1 DECREASED STRENGTH: ICD-10-CM

## 2023-06-05 DIAGNOSIS — M25.561 RIGHT MEDIAL KNEE PAIN: ICD-10-CM

## 2023-06-05 DIAGNOSIS — M25.611 DECREASED RANGE OF MOTION OF RIGHT SHOULDER: Primary | ICD-10-CM

## 2023-06-05 PROCEDURE — 97110 THERAPEUTIC EXERCISES: CPT | Mod: PN | Performed by: PHYSICAL THERAPIST

## 2023-06-05 PROCEDURE — 97530 THERAPEUTIC ACTIVITIES: CPT | Mod: PN | Performed by: PHYSICAL THERAPIST

## 2023-06-05 NOTE — PROGRESS NOTES
"[facility]   Physical Therapy Treatment Note      Name: Gerald Wilkinson  Clinic Number: [id number]    Therapy Diagnosis:   Encounter Diagnoses   Name Primary?    Decreased range of motion of right shoulder Yes    Decreased strength     Right medial knee pain        Physician: Phil Tellez*    Visit Date: 6/5/2023    Physician Orders: PT Eval and Treat   Medical Diagnosis from Referral: M25.511,G89.29 (ICD-10-CM) - Chronic right shoulder pain   Evaluation Date: 4/17/2023  Authorization Period Expiration: 12/31/2023  Plan of Care Expiration: 7/10/2023  Progress Note Due: 6/15/2023   Visit # / Visits authorized: 9 / 21   FOTO: 3/3 shoulder  (closed)  FOTO: 2/3 knee      Precautions: Standard     PTA Visit #: 0/5     Time In: 1605  Time Out: 1645  Total Billable Time: 40 minutes    Subjective   Pt reports: he is still getting pain at times, felt a soreness walking to his truck this morning which relieved with massage and did not return. Pain is less frequent, and he finds he's using the brace less and less frequently     He was compliant with home exercise program.  Response to previous treatment: "It was fine."  Functional change: nothing reported for the knee. Can reach to get something out the backseat of his car without pain    Pain: 0/10  Location: R medial knee    Objective      Objective Measures updated at progress report unless specified.   5/16/2023    PROM  L shoulder IR 70 deg    MMT  B shoulder ER 4+/5    CMS Impairment/Limitation/Restriction for FOTO Shoulder Survey    Therapist reviewed FOTO scores for Gerald Wilkinson on 5/16/2023  FOTO documents entered into [employer] - see Media section.    Evaluation: 30%    Current : 16%    Goal: 27%       5/16/2023 Knee screen:    Observation: mild swelling noted to medial R knee compared to L    Range of Motion:   Knee Left active Left Passive Right Active R passive   Flexion 130 135 115 120   Extension 0 +5 +5 0     Lower Extremity " "Strength  Right LE  Left LE    Ankle dorsiflexion: 5/5 Ankle dorsiflexion: 5/5   Ankle plantarflexion: 5/5 Ankle plantarflexion: 5/5   Knee extension: 4+/5 Knee extension: 5/5   Knee flexion: 4/5 Knee flexion: 4+/5   Hip flexion: 4+/5 Hip flexion: 5/5   Hip external rotation 4/5 Hip external rotation 4+/5   Hip internal rotation 4/5 Hip internal rotation 4+/5   Hip abduction:  4/5 Hip abduction: 4+/5   Hip adduction: 5/5 Hip adduction: 5/5   Hip extension: 4-/5 Hip extension 4-/5     Function:    - SLS R: WFL  - SLS L: WFL    Joint Mobility: hypomobile R Patella    Palpation: tenderness to R pes anserine      Flexibility:    Hamstring: R = slight; L = slight   Quad: R = mod; L = mild     Treatment     Gerald received the bolded treatments listed below:      therapeutic exercises to develop strength, endurance, ROM, flexibility, posture, and core stabilization for 15 minutes including:  Upright bike x 5'  (Seat level 5; Resistance level 2)    LAQ with ball squeeze 3 x 10 x 3#  side lying hip abd 10 x 10 sec holds  elevated clam 10 x 10" holds    Heel raises x 30    IR strap stretch 5 x 10"  Supine pec stretch on half foam x 2'  Supine on half foam D2 flex 2 x 10  Supine on half foam hor abd thin orange monster band 2 x 10  Supine on half foam B shoulder ER thin orange monster band 2 x 10  Shoulder ER/IR walkout RTB x 10    Future visit:  robbery, lat pulls, flasher    manual therapy techniques: Joint mobilizations were applied to the: shoulder and TSP for 00 minutes, including:  The stick stm to R IT band  STM medial hamstrings    Preparation and application of  Ktape to R knee. I strip to medial knee over pes anserine  Anterior and posterior glides to R shoulder  PA oscillatory mobilization to mid TSP  Lateral glides to CSP   MET for R lateral bending and rotation  Seated MET (iesha) to CTJ    neuromuscular re-education activities to improve: Proprioception and Posture for 00 minutes. The following activities were " "included:  Prone scap squeezes x 20  5" hold  Prone I's x 20  Prone T's x 20  Prone Y's x 20  +Rows RTB x 20  +Shoulder ext RTB x 20    therapeutic activities to improve functional performance for 25 minutes, including:  Shuttle DL 2 black1/red bands x 20   Shuttle SL 1 black/1 red band x 20 R  Lateral walking GTB 40ft x 2  sumo walk GTB 40 ft x 2  Hesitation marchomar uni 15# KB 40ft x 2    Forward step ups 20 reps on 4" step (increase NV)  Forward step downs 2 x 10 reps on 4" step    Patient Education and Home Exercises       Education provided:   - Exercise form and rationale    Written Home Exercises Provided: Patient instructed to cont prior HEP. Exercises were reviewed and Gerald was able to demonstrate them prior to the end of the session.  Gerald demonstrated good  understanding of the education provided. See EMR under Patient Instructions for exercises provided during therapy sessions    Assessment     Good tolerance to treatment today. Increased resistance with lateral and sumo walks with good training effect. Pt reports ease after completing step ups, plan to increase step height at next visit.     Gerald Is progressing well towards his goals.   Pt prognosis is Good.     Pt will continue to benefit from skilled outpatient physical therapy to address the deficits listed in the problem list box on initial evaluation, provide pt/family education and to maximize pt's level of independence in the home and community environment.     Pt's spiritual, cultural and educational needs considered and pt agreeable to plan of care and goals.     Anticipated barriers to physical therapy: none     Goals: updated 4/28/2023  Short Term Goals:  6 weeks  1.Report decreased right shoulder pain < / =  2/10  to increase tolerance for reaching behind back, tucking in shirt. (met)  2. Increase PROM of left shoulder INTERNAL ROTATION to 45 degrees for improved reaching behind back, tucking in shirt (met)  3. Increased strength by 1/3 " MMT grade in right shoulder ER to increase tolerance for ADL and work activities. (met)  4. Pt to tolerate HEP to improve ROM and independence with ADL's (met)  5. Pt will demonstrate improvements in R knee ROM to 0-120-125 for ease with ambulation (Progressing, not met)  6. Pt will report <4/10 pain at worst within the R knee for ease with ADL's. (Progressing, not met)     Long Term Goals: 12 weeks  1.Perform weighted ball circles on wall for 1 min each direction at 90 degrees for improved overhead endurance tasks (met)  2.Increase AROM to left shoulder INTERNAL ROTATION to T10 for improved functional movements (met)  3.Increase strength to >/= 5/5 in right shoulder EXTERNAL ROTATION, flexion, abduction to increase tolerance for ADL and work activities. (met)  4. Pt goal: return to golf with </= 1/10 pain in right shoulder and knee (Progressing, not met)  5. Pt will have improved gcode of 20% on FOTO shoulder in order to demonstrate true functional improvement.  (met)  6. Pt will demonstrate improvements in R knee ROM to 0-130-135 for ease with ambulation and household chores. (Progressing, not met)  7. Pt will demonstrate 4+/5 strength within B LE for ease with house hold chores and climbing stairs (Progressing, not met)    Plan   Plan of care Certification: 4/17/2023 to 7/10/2023.     Improve R knee ROM and strength    Elsa Baker, PT

## 2023-06-09 ENCOUNTER — CLINICAL SUPPORT (OUTPATIENT)
Dept: REHABILITATION | Facility: OTHER | Age: 73
End: 2023-06-09
Payer: MEDICARE

## 2023-06-09 DIAGNOSIS — R53.1 DECREASED STRENGTH: ICD-10-CM

## 2023-06-09 DIAGNOSIS — M25.611 DECREASED RANGE OF MOTION OF RIGHT SHOULDER: Primary | ICD-10-CM

## 2023-06-09 DIAGNOSIS — M25.561 RIGHT MEDIAL KNEE PAIN: ICD-10-CM

## 2023-06-09 PROCEDURE — 97530 THERAPEUTIC ACTIVITIES: CPT | Mod: PN

## 2023-06-09 PROCEDURE — 97110 THERAPEUTIC EXERCISES: CPT | Mod: PN

## 2023-06-09 NOTE — PROGRESS NOTES
"[facility]   Physical Therapy Treatment Note      Name: Gerald Wilkinson  Clinic Number: [id number]    Therapy Diagnosis:   Encounter Diagnoses   Name Primary?    Decreased range of motion of right shoulder Yes    Decreased strength     Right medial knee pain        Physician: Phil Tellez*    Visit Date: 6/9/2023    Physician Orders: PT Eval and Treat   Medical Diagnosis from Referral: M25.511,G89.29 (ICD-10-CM) - Chronic right shoulder pain   Evaluation Date: 4/17/2023  Authorization Period Expiration: 12/31/2023  Plan of Care Expiration: 7/10/2023  Progress Note Due: 6/15/2023   Visit # / Visits authorized: 10 / 21   FOTO: 3/3 shoulder  (closed)  FOTO: 2/3 knee      Precautions: Standard     PTA Visit #: 0/5     Time In: 10:45  Time Out: 11:30  Total Billable Time: 45 minutes    Subjective   Pt reports: knee is feeling better, denies pain today. Increased ease getting into/out of truck.     He was compliant with home exercise program.  Response to previous treatment: "It was fine."  Functional change: nothing reported for the knee. Can reach to get something out the backseat of his car without pain    Pain: 0/10  Location: R medial knee    Objective      Objective Measures updated at progress report unless specified.   5/16/2023    PROM  L shoulder IR 70 deg    MMT  B shoulder ER 4+/5    CMS Impairment/Limitation/Restriction for FOTO Shoulder Survey    Therapist reviewed FOTO scores for Gerald Wilkinson on 5/16/2023  FOTO documents entered into [employer] - see Media section.    Evaluation: 30%    Current : 16%    Goal: 27%       5/16/2023 Knee screen:    Observation: mild swelling noted to medial R knee compared to L    Range of Motion:   Knee Left active Left Passive Right Active R passive   Flexion 130 135 115 120   Extension 0 +5 +5 0     Lower Extremity Strength  Right LE  Left LE    Ankle dorsiflexion: 5/5 Ankle dorsiflexion: 5/5   Ankle plantarflexion: 5/5 Ankle plantarflexion: " "5/5   Knee extension: 4+/5 Knee extension: 5/5   Knee flexion: 4/5 Knee flexion: 4+/5   Hip flexion: 4+/5 Hip flexion: 5/5   Hip external rotation 4/5 Hip external rotation 4+/5   Hip internal rotation 4/5 Hip internal rotation 4+/5   Hip abduction:  4/5 Hip abduction: 4+/5   Hip adduction: 5/5 Hip adduction: 5/5   Hip extension: 4-/5 Hip extension 4-/5     Function:    - SLS R: WFL  - SLS L: WFL    Joint Mobility: hypomobile R Patella    Palpation: tenderness to R pes anserine      Flexibility:    Hamstring: R = slight; L = slight   Quad: R = mod; L = mild     Treatment     Gerald received the bolded treatments listed below:      therapeutic exercises to develop strength, endurance, ROM, flexibility, posture, and core stabilization for 20 minutes including:  Upright bike x 5'  (Seat level 5; Resistance level 2)    LAQ with ball squeeze 3 x 10 x 3#  side lying hip abd 10 x 10 sec holds  elevated clam 10 x 10" holds    Heel raises x 30      manual therapy techniques: Joint mobilizations were applied to the: shoulder and TSP for 00 minutes, including:  The stick stm to R IT band  STM medial hamstrings    Preparation and application of  Ktape to R knee. I strip to medial knee over pes anserine  Anterior and posterior glides to R shoulder  PA oscillatory mobilization to mid TSP  Lateral glides to CSP   MET for R lateral bending and rotation  Seated MET (genie) to CTJ    neuromuscular re-education activities to improve: Proprioception and Posture for 00 minutes. The following activities were included:  Prone scap squeezes x 20  5" hold  Prone I's x 20  Prone T's x 20  Prone Y's x 20  +Rows RTB x 20  +Shoulder ext RTB x 20    therapeutic activities to improve functional performance for 25 minutes, including:  Shuttle DL 2 black1/red bands x 30   Shuttle SL 1 black/1 red band x 30 R  Lateral walking GTB 40ft x 2  sumo walk GTB 40 ft x 2  Hesitation marches uni 15# KB 40ft x 2    Forward step ups 20 reps on 4" step " "(increase NV)  Forward step downs 2 x 10 reps on 4" step    Patient Education and Home Exercises       Education provided:   - Exercise form and rationale    Written Home Exercises Provided: Patient instructed to cont prior HEP. Exercises were reviewed and Gerald was able to demonstrate them prior to the end of the session.  Gerald demonstrated good  understanding of the education provided. See EMR under Patient Instructions for exercises provided during therapy sessions    Assessment     Increased reps on shuttle today, indicating improving mm endurance, motor activation. Noted medial knee pain with sumo walks, with diminished sx after VC for glute activation and active hip abd/ER to reduce knee valgus.    Gerald Is progressing well towards his goals.   Pt prognosis is Good.     Pt will continue to benefit from skilled outpatient physical therapy to address the deficits listed in the problem list box on initial evaluation, provide pt/family education and to maximize pt's level of independence in the home and community environment.     Pt's spiritual, cultural and educational needs considered and pt agreeable to plan of care and goals.     Anticipated barriers to physical therapy: none     Goals: updated 4/28/2023  Short Term Goals:  6 weeks  1.Report decreased right shoulder pain < / =  2/10  to increase tolerance for reaching behind back, tucking in shirt. (met)  2. Increase PROM of left shoulder INTERNAL ROTATION to 45 degrees for improved reaching behind back, tucking in shirt (met)  3. Increased strength by 1/3 MMT grade in right shoulder ER to increase tolerance for ADL and work activities. (met)  4. Pt to tolerate HEP to improve ROM and independence with ADL's (met)  5. Pt will demonstrate improvements in R knee ROM to 0-120-125 for ease with ambulation (Progressing, not met)  6. Pt will report <4/10 pain at worst within the R knee for ease with ADL's. (Progressing, not met)     Long Term Goals: 12 " weeks  1.Perform weighted ball circles on wall for 1 min each direction at 90 degrees for improved overhead endurance tasks (met)  2.Increase AROM to left shoulder INTERNAL ROTATION to T10 for improved functional movements (met)  3.Increase strength to >/= 5/5 in right shoulder EXTERNAL ROTATION, flexion, abduction to increase tolerance for ADL and work activities. (met)  4. Pt goal: return to golf with </= 1/10 pain in right shoulder and knee (Progressing, not met)  5. Pt will have improved gcode of 20% on FOTO shoulder in order to demonstrate true functional improvement.  (met)  6. Pt will demonstrate improvements in R knee ROM to 0-130-135 for ease with ambulation and household chores. (Progressing, not met)  7. Pt will demonstrate 4+/5 strength within B LE for ease with house hold chores and climbing stairs (Progressing, not met)    Plan   Plan of care Certification: 4/17/2023 to 7/10/2023.     Improve R knee ROM and strength    Luz Swartz, PT

## 2023-06-13 ENCOUNTER — CLINICAL SUPPORT (OUTPATIENT)
Dept: REHABILITATION | Facility: OTHER | Age: 73
End: 2023-06-13
Payer: MEDICARE

## 2023-06-13 DIAGNOSIS — M25.561 RIGHT MEDIAL KNEE PAIN: ICD-10-CM

## 2023-06-13 DIAGNOSIS — M25.611 DECREASED RANGE OF MOTION OF RIGHT SHOULDER: Primary | ICD-10-CM

## 2023-06-13 DIAGNOSIS — R53.1 DECREASED STRENGTH: ICD-10-CM

## 2023-06-13 PROCEDURE — 97530 THERAPEUTIC ACTIVITIES: CPT | Mod: PN

## 2023-06-13 PROCEDURE — 97110 THERAPEUTIC EXERCISES: CPT | Mod: PN

## 2023-06-13 NOTE — PROGRESS NOTES
"[facility]   Physical Therapy Treatment Note      Name: Gerald Wilkinson  Clinic Number: [id number]    Therapy Diagnosis:   Encounter Diagnoses   Name Primary?    Decreased range of motion of right shoulder Yes    Decreased strength     Right medial knee pain        Physician: Phil Tellez*    Visit Date: 6/13/2023    Physician Orders: PT Eval and Treat   Medical Diagnosis from Referral: M25.511,G89.29 (ICD-10-CM) - Chronic right shoulder pain   Evaluation Date: 4/17/2023  Authorization Period Expiration: 12/31/2023  Plan of Care Expiration: 7/10/2023  Progress Note Due: 6/15/2023   Visit # / Visits authorized: 11 / 21   FOTO: 3/3 shoulder  (closed)  FOTO: 2/3 knee      Precautions: Standard     PTA Visit #: 0/5     Time In: 2:15  Time Out: 3:00  Total Billable Time: 45 minutes    Subjective   Pt reports: going to hit golf balls over the weekend. "I don't know if I did anything to it or not but I haven't had knee pain in the last 2 days."    He was compliant with home exercise program.  Response to previous treatment: "It was fine."  Functional change: nothing reported for the knee. Can reach to get something out the backseat of his car without pain    Pain: 0/10  Location: R medial knee    Objective      Objective Measures updated at progress report unless specified.   5/16/2023 - knee ROM    6/13/23 - MMT    Lower Extremity Strength  Right LE   Left LE     Knee extension: 5/5 Knee extension: 5/5   Knee flexion: 4+/5 Knee flexion: 4+/5   Hip flexion: 4+/5 Hip flexion: 5/5   Hip external rotation 4+/5 Hip external rotation 4+/5   Hip internal rotation 4+/5 Hip internal rotation 4+/5   Hip abduction:  4+/5 Hip abduction: 4+/5   Hip adduction: 5/5 Hip adduction: 5/5   Hip extension: 4/5 Hip extension 4/5        CMS Impairment/Limitation/Restriction for FOTO Shoulder Survey    Therapist reviewed FOTO scores for Gerald Wilkinson on 5/16/2023  FOTO documents entered into [employer] - see Media " "section.    Evaluation: 30%    Current : 16%    Goal: 27%       5/16/2023 Knee screen:       Treatment     Gerald received the bolded treatments listed below:      therapeutic exercises to develop strength, endurance, ROM, flexibility, posture, and core stabilization for 20 minutes including:  Upright bike x 5'  (Seat level 5; Resistance level 2)    LAQ with ball squeeze 3 x 10 x 3#  side lying hip abd 10 x 10 sec holds  elevated clam 10 x 10" holds    Heel raises x 30    +steam boat x 20 x GTB at ankles    manual therapy techniques: Joint mobilizations were applied to the: shoulder and TSP for 00 minutes, including:  The stick stm to R IT band  STM medial hamstrings    neuromuscular re-education activities to improve: Proprioception and Posture for 00 minutes. The following activities were included:  None today    therapeutic activities to improve functional performance for 25 minutes, including:  Shuttle DL 2 black1/red bands x 30   Shuttle SL 1 black/1 red band x 30 R  Lateral walking GTB 40ft x 2  sumo walk GTB 40 ft x 2 - defer today  Hesitation marches uni 15# KB 40ft x 2    Forward step ups 20 reps on 4" step (increase NV)  Forward step downs 2 x 10 reps on 4" step    Patient Education and Home Exercises       Education provided:   - Exercise form and rationale    Written Home Exercises Provided: Patient instructed to cont prior HEP. Exercises were reviewed and Gerald was able to demonstrate them prior to the end of the session.  Gerald demonstrated good  understanding of the education provided. See EMR under Patient Instructions for exercises provided during therapy sessions    Assessment     Strength measurements updated today, with pt presenting with good but slow improvements in leg and hip strength today.   Gerald Is progressing well towards his goals.   Pt prognosis is Good.     Pt will continue to benefit from skilled outpatient physical therapy to address the deficits listed in the problem list box on " initial evaluation, provide pt/family education and to maximize pt's level of independence in the home and community environment.     Pt's spiritual, cultural and educational needs considered and pt agreeable to plan of care and goals.     Anticipated barriers to physical therapy: none     Goals: updated 4/28/2023  Short Term Goals:  6 weeks  1.Report decreased right shoulder pain < / =  2/10  to increase tolerance for reaching behind back, tucking in shirt. (met)  2. Increase PROM of left shoulder INTERNAL ROTATION to 45 degrees for improved reaching behind back, tucking in shirt (met)  3. Increased strength by 1/3 MMT grade in right shoulder ER to increase tolerance for ADL and work activities. (met)  4. Pt to tolerate HEP to improve ROM and independence with ADL's (met)  5. Pt will demonstrate improvements in R knee ROM to 0-120-125 for ease with ambulation (Progressing, not met)  6. Pt will report <4/10 pain at worst within the R knee for ease with ADL's. (Progressing, not met)     Long Term Goals: 12 weeks  1.Perform weighted ball circles on wall for 1 min each direction at 90 degrees for improved overhead endurance tasks (met)  2.Increase AROM to left shoulder INTERNAL ROTATION to T10 for improved functional movements (met)  3.Increase strength to >/= 5/5 in right shoulder EXTERNAL ROTATION, flexion, abduction to increase tolerance for ADL and work activities. (met)  4. Pt goal: return to golf with </= 1/10 pain in right shoulder and knee (Progressing, not met)  5. Pt will have improved gcode of 20% on FOTO shoulder in order to demonstrate true functional improvement.  (met)  6. Pt will demonstrate improvements in R knee ROM to 0-130-135 for ease with ambulation and household chores. (Progressing, not met)  7. Pt will demonstrate 4+/5 strength within B LE for ease with house hold chores and climbing stairs (Progressing, not met)    Plan   Plan of care Certification: 4/17/2023 to 7/10/2023.     Improve R knee  ROM and strength    Luz Swartz, PT

## 2023-06-16 ENCOUNTER — CLINICAL SUPPORT (OUTPATIENT)
Dept: REHABILITATION | Facility: OTHER | Age: 73
End: 2023-06-16
Payer: MEDICARE

## 2023-06-16 DIAGNOSIS — M25.611 DECREASED RANGE OF MOTION OF RIGHT SHOULDER: Primary | ICD-10-CM

## 2023-06-16 DIAGNOSIS — R53.1 DECREASED STRENGTH: ICD-10-CM

## 2023-06-16 DIAGNOSIS — M25.561 RIGHT MEDIAL KNEE PAIN: ICD-10-CM

## 2023-06-16 PROCEDURE — 97530 THERAPEUTIC ACTIVITIES: CPT | Mod: PN

## 2023-06-16 PROCEDURE — 97110 THERAPEUTIC EXERCISES: CPT | Mod: PN

## 2023-06-16 NOTE — PROGRESS NOTES
"[facility]   Physical Therapy Treatment Note      Name: Gerald Wilkinson  Clinic Number: [id number]    Therapy Diagnosis:   Encounter Diagnoses   Name Primary?    Decreased range of motion of right shoulder Yes    Decreased strength     Right medial knee pain          Physician: Phil Tellez*    Visit Date: 6/16/2023    Physician Orders: PT Eval and Treat   Medical Diagnosis from Referral: M25.511,G89.29 (ICD-10-CM) - Chronic right shoulder pain   Evaluation Date: 4/17/2023  Authorization Period Expiration: 12/31/2023  Plan of Care Expiration: 7/10/2023  Progress Note Due: updated to 7/15/2023   Visit # / Visits authorized: 12 / 21   FOTO: 3/3 shoulder  (closed)  FOTO: 2/3 knee      Precautions: Standard     PTA Visit #: 0/5     Time In: 9:15  Time Out: 10:00  Total Billable Time: 45 minutes    Subjective   Pt reports: he continues to be painfree since last visit.     He was compliant with home exercise program.  Response to previous treatment: "It was fine."  Functional change: nothing reported for the knee. Can reach to get something out the backseat of his car without pain    Pain: 0/10  Location: R medial knee    Objective      Objective Measures updated at progress report unless specified.   5/16/2023 - knee ROM    6/13/23 - MMT    Lower Extremity Strength  Right LE   Left LE     Knee extension: 5/5 Knee extension: 5/5   Knee flexion: 4+/5 Knee flexion: 4+/5   Hip flexion: 4+/5 Hip flexion: 5/5   Hip external rotation 4+/5 Hip external rotation 4+/5   Hip internal rotation 4+/5 Hip internal rotation 4+/5   Hip abduction:  4+/5 Hip abduction: 4+/5   Hip adduction: 5/5 Hip adduction: 5/5   Hip extension: 4/5 Hip extension 4/5         Treatment     Gerald received the bolded treatments listed below:      therapeutic exercises to develop strength, endurance, ROM, flexibility, posture, and core stabilization for 20 minutes including:  Upright bike x 5'  (Seat level 5; Resistance " "level 2)    LAQ with ball squeeze 3 x 10 x 3#  side lying hip abd 10 x 10 sec holds  elevated clam 10 x 10" holds    Heel raises x 30    steam boat x 15 x GTB at ankles    manual therapy techniques: Joint mobilizations were applied to the: shoulder and TSP for 00 minutes, including:  The stick stm to R IT band  STM medial hamstrings    neuromuscular re-education activities to improve: Proprioception and Posture for 00 minutes. The following activities were included:  None today    therapeutic activities to improve functional performance for 25 minutes, including:  Shuttle DL 2 black1/red bands x 30   Shuttle SL 1 black/1 red band x 30 R  Lateral walking GTB 60ft x 2  sumo walk GTB 60 ft x 2  Hesitation marches uni 15# KB 40ft x 2    Forward step ups 20 reps x 8" step (no HHA, PT SBA)  Forward step downs 2 x 10 reps on 4" step    Patient Education and Home Exercises       Education provided:   - Exercise form and rationale    Written Home Exercises Provided: Patient instructed to cont prior HEP. Exercises were reviewed and Gerald was able to demonstrate them prior to the end of the session.  Gerald demonstrated good  understanding of the education provided. See EMR under Patient Instructions for exercises provided during therapy sessions    Assessment     Increased step height with step ups today; able to perform step up with contralat hip hike without HHA today. As pt is feeling good, painfree, and able to climb into trunk and play golf without c/o, consider progression to d/c in the next week.  Gerald Is progressing well towards his goals.   Pt prognosis is Good.     Pt will continue to benefit from skilled outpatient physical therapy to address the deficits listed in the problem list box on initial evaluation, provide pt/family education and to maximize pt's level of independence in the home and community environment.     Pt's spiritual, cultural and educational needs considered and pt agreeable to plan of care " and goals.     Anticipated barriers to physical therapy: none     Goals: updated 4/28/2023  Short Term Goals:  6 weeks  1.Report decreased right shoulder pain < / =  2/10  to increase tolerance for reaching behind back, tucking in shirt. (met)  2. Increase PROM of left shoulder INTERNAL ROTATION to 45 degrees for improved reaching behind back, tucking in shirt (met)  3. Increased strength by 1/3 MMT grade in right shoulder ER to increase tolerance for ADL and work activities. (met)  4. Pt to tolerate HEP to improve ROM and independence with ADL's (met)  5. Pt will demonstrate improvements in R knee ROM to 0-120-125 for ease with ambulation (Progressing, not met)  6. Pt will report <4/10 pain at worst within the R knee for ease with ADL's. (Progressing, not met)     Long Term Goals: 12 weeks  1.Perform weighted ball circles on wall for 1 min each direction at 90 degrees for improved overhead endurance tasks (met)  2.Increase AROM to left shoulder INTERNAL ROTATION to T10 for improved functional movements (met)  3.Increase strength to >/= 5/5 in right shoulder EXTERNAL ROTATION, flexion, abduction to increase tolerance for ADL and work activities. (met)  4. Pt goal: return to golf with </= 1/10 pain in right shoulder and knee (Progressing, not met)  5. Pt will have improved gcode of 20% on FOTO shoulder in order to demonstrate true functional improvement.  (met)  6. Pt will demonstrate improvements in R knee ROM to 0-130-135 for ease with ambulation and household chores. (Progressing, not met)  7. Pt will demonstrate 4+/5 strength within B LE for ease with house hold chores and climbing stairs (Progressing, not met)    Plan   Plan of care Certification: 4/17/2023 to 7/10/2023.     Improve R knee ROM and strength    Luz Swartz, PT

## 2023-06-19 ENCOUNTER — CLINICAL SUPPORT (OUTPATIENT)
Dept: REHABILITATION | Facility: OTHER | Age: 73
End: 2023-06-19
Payer: MEDICARE

## 2023-06-19 DIAGNOSIS — R53.1 DECREASED STRENGTH: ICD-10-CM

## 2023-06-19 DIAGNOSIS — M25.611 DECREASED RANGE OF MOTION OF RIGHT SHOULDER: Primary | ICD-10-CM

## 2023-06-19 DIAGNOSIS — M25.561 RIGHT MEDIAL KNEE PAIN: ICD-10-CM

## 2023-06-19 PROCEDURE — 97110 THERAPEUTIC EXERCISES: CPT | Mod: PN | Performed by: PHYSICAL THERAPIST

## 2023-06-19 PROCEDURE — 97530 THERAPEUTIC ACTIVITIES: CPT | Mod: PN | Performed by: PHYSICAL THERAPIST

## 2023-06-19 NOTE — PROGRESS NOTES
"[facility]   Physical Therapy Treatment Note      Name: Gerald Wilkinson  Clinic Number: [id number]    Therapy Diagnosis:   Encounter Diagnoses   Name Primary?    Decreased range of motion of right shoulder Yes    Decreased strength     Right medial knee pain          Physician: Phil Tellez*    Visit Date: 6/19/2023    Physician Orders: PT Eval and Treat   Medical Diagnosis from Referral: M25.511,G89.29 (ICD-10-CM) - Chronic right shoulder pain   Evaluation Date: 4/17/2023  Authorization Period Expiration: 12/31/2023  Plan of Care Expiration: 7/10/2023  Progress Note Due: updated to 7/15/2023   Visit # / Visits authorized: 13 / 21   FOTO: 3/3 shoulder  (closed)  FOTO: 2/3 knee      Precautions: Standard     PTA Visit #: 0/5     Time In: 1600  Time Out: 1645  Total Billable Time: 45 minutes    Subjective   Pt reports: he continues to be painfree since last visit.     He was compliant with home exercise program.  Response to previous treatment: "It was fine."  Functional change: nothing reported for the knee. Can reach to get something out the backseat of his car without pain    Pain: 0/10  Location: R medial knee    Objective      Objective Measures updated at progress report unless specified.   5/16/2023 - knee ROM    6/13/23 - MMT    Lower Extremity Strength  Right LE   Left LE     Knee extension: 5/5 Knee extension: 5/5   Knee flexion: 4+/5 Knee flexion: 4+/5   Hip flexion: 4+/5 Hip flexion: 5/5   Hip external rotation 4+/5 Hip external rotation 4+/5   Hip internal rotation 4+/5 Hip internal rotation 4+/5   Hip abduction:  4+/5 Hip abduction: 4+/5   Hip adduction: 5/5 Hip adduction: 5/5   Hip extension: 4/5 Hip extension 4/5         Treatment     Gerald received the bolded treatments listed below:      therapeutic exercises to develop strength, endurance, ROM, flexibility, posture, and core stabilization for 20 minutes including:  Upright bike x 5'  (Seat level 5; Resistance " "level 2)    LAQ with ball squeeze 3 x 10 x 3#  side lying hip abd 10 x 10 sec holds  elevated clam 10 x 10" holds    Heel raises x 30    steam boat x 15 x GTB at ankles    manual therapy techniques: Joint mobilizations were applied to the: shoulder and TSP for 00 minutes, including:  The stick stm to R IT band  STM medial hamstrings    neuromuscular re-education activities to improve: Proprioception and Posture for 00 minutes. The following activities were included:  None today    therapeutic activities to improve functional performance for 25 minutes, including:  Shuttle DL 3 black/0 red bands x 30   Shuttle SL 2 black/0 red bands x 30 R  +Shuttle sidelying R x 1 black band x 30   Lateral walking GTB 60ft x 2  sumo walk GTB 60 ft x 2  Hesitation marches uni 15# KB 40ft x 2    Forward step ups 20 reps x 8" step (no HHA, PT SBA)  Forward step downs 2 x 10 reps on 4" step    Patient Education and Home Exercises       Education provided:   - Exercise form and rationale    Written Home Exercises Provided: Patient instructed to cont prior HEP. Exercises were reviewed and Gerald was able to demonstrate them prior to the end of the session.  Gerald demonstrated good  understanding of the education provided. See EMR under Patient Instructions for exercises provided during therapy sessions    Assessment     Good tolerance to treatment today. Increased resistance with shuttle and added sidelying press with good training effect noted. Recommend progression towards discharge over the next week.   Gerald Is progressing well towards his goals.   Pt prognosis is Good.     Pt will continue to benefit from skilled outpatient physical therapy to address the deficits listed in the problem list box on initial evaluation, provide pt/family education and to maximize pt's level of independence in the home and community environment.     Pt's spiritual, cultural and educational needs considered and pt agreeable to plan of care and goals.   "   Anticipated barriers to physical therapy: none     Goals: updated 4/28/2023  Short Term Goals:  6 weeks  1.Report decreased right shoulder pain < / =  2/10  to increase tolerance for reaching behind back, tucking in shirt. (met)  2. Increase PROM of left shoulder INTERNAL ROTATION to 45 degrees for improved reaching behind back, tucking in shirt (met)  3. Increased strength by 1/3 MMT grade in right shoulder ER to increase tolerance for ADL and work activities. (met)  4. Pt to tolerate HEP to improve ROM and independence with ADL's (met)  5. Pt will demonstrate improvements in R knee ROM to 0-120-125 for ease with ambulation (Progressing, not met)  6. Pt will report <4/10 pain at worst within the R knee for ease with ADL's. (Progressing, not met)     Long Term Goals: 12 weeks  1.Perform weighted ball circles on wall for 1 min each direction at 90 degrees for improved overhead endurance tasks (met)  2.Increase AROM to left shoulder INTERNAL ROTATION to T10 for improved functional movements (met)  3.Increase strength to >/= 5/5 in right shoulder EXTERNAL ROTATION, flexion, abduction to increase tolerance for ADL and work activities. (met)  4. Pt goal: return to golf with </= 1/10 pain in right shoulder and knee (Progressing, not met)  5. Pt will have improved gcode of 20% on FOTO shoulder in order to demonstrate true functional improvement.  (met)  6. Pt will demonstrate improvements in R knee ROM to 0-130-135 for ease with ambulation and household chores. (Progressing, not met)  7. Pt will demonstrate 4+/5 strength within B LE for ease with house hold chores and climbing stairs (Progressing, not met)    Plan   Plan of care Certification: 4/17/2023 to 7/10/2023.     Improve R knee ROM and strength    Elsa Baker, PT

## 2023-06-28 NOTE — PROGRESS NOTES
"[facility]   Physical Therapy Treatment Note      Name: Gerald Wilkinson  Clinic Number: [id number]    Therapy Diagnosis:   Encounter Diagnoses   Name Primary?    Decreased range of motion of right shoulder Yes    Decreased strength     Right medial knee pain            Physician: Phil Tellez*    Visit Date: 6/29/2023    Physician Orders: PT Eval and Treat   Medical Diagnosis from Referral: M25.511,G89.29 (ICD-10-CM) - Chronic right shoulder pain   Evaluation Date: 4/17/2023  Authorization Period Expiration: 12/31/2023  Plan of Care Expiration: 7/10/2023  Progress Note Due: updated to 7/15/2023   Visit # / Visits authorized: 13 / 21   FOTO: 3/3 shoulder  (closed)  FOTO: 2/3 knee      Precautions: Standard     PTA Visit #: 0/5     Time In: 900am  Time Out: 940am  Total Billable Time: 40 minutes    Subjective   Pt reports: he has been painfree for 2 weeks. Wants today to be his last day of PT. Also wants an updated HEP.      He was compliant with home exercise program.  Response to previous treatment: "It was fine."  Functional change: painfree with all ADLs    Pain: 0/10  Location: R medial knee    Objective      Objective Measures updated at progress report unless specified.     6/29/2023  Knee ROM: WNL  Hip strength: 5/5  Knee strength: 5/5        Treatment     Gerald received the bolded treatments listed below:      therapeutic exercises to develop strength, endurance, ROM, flexibility, posture, and core stabilization for 25 minutes including:  Upright bike x 5'  (Seat level 5; Resistance level 2)    Reassessment + review of updated HEP x 10'  LAQ with ball squeeze 3 x 10 x 3#  side lying hip abd 10 x 10 sec holds  elevated clam 10 x 10" holds    Heel raises x 30    steam boat x 15 x GTB at ankles    manual therapy techniques: Joint mobilizations were applied to the: shoulder and TSP for 00 minutes, including:  The stick stm to R IT band  STM medial hamstrings    neuromuscular " "re-education activities to improve: Proprioception and Posture for 00 minutes. The following activities were included:  None today    therapeutic activities to improve functional performance for 15 minutes, including:  Shuttle DL 3 black/0 red bands x 20   Shuttle SL 2 black/0 red bands x 20 R  +Shuttle sidelying R x 1 black band x 30   Lateral walking GTB 40ft x 2  sumo walk GTB 40 ft x 2  Hesitation marches uni 20# KB 40ft x 2  Forward step ups 20 reps x 8" step (no HHA, PT SBA)  Forward step downs 2 x 10 reps on 4" step    Patient Education and Home Exercises       Education provided:   - Exercise form and rationale  - 6/29/2023 - updated HEP    Written Home Exercises Provided: Patient instructed to cont prior HEP. Exercises were reviewed and Gerald was able to demonstrate them prior to the end of the session.  Gerald demonstrated good  understanding of the education provided. See EMR under Patient Instructions for exercises provided during therapy sessions    Assessment     Pt presents with functional mobility, strength to allow for dynamic stabilization with ADLs. Updated HEP to reflect progression of TE program in clinic. Pt is independent with HEP and demonstrates good return technique. Pt no longer has pain or difficulty with functional activities and has returned to PLOF. Pt has progressed well and has met 13 of 13 therapeutic goals. Pt no longer requires skilled PT. Recommend D/C from skilled care.    Pt's spiritual, cultural and educational needs considered and pt agreeable to plan of care and goals.     Anticipated barriers to physical therapy: none     Goals: updated 4/28/2023  Short Term Goals:  6 weeks  1.Report decreased right shoulder pain < / =  2/10  to increase tolerance for reaching behind back, tucking in shirt. (met)  2. Increase PROM of left shoulder INTERNAL ROTATION to 45 degrees for improved reaching behind back, tucking in shirt (met)  3. Increased strength by 1/3 MMT grade in right " shoulder ER to increase tolerance for ADL and work activities. (met)  4. Pt to tolerate HEP to improve ROM and independence with ADL's (met)  5. Pt will demonstrate improvements in R knee ROM to 0-120-125 for ease with ambulation (met)  6. Pt will report <4/10 pain at worst within the R knee for ease with ADL's. (met)     Long Term Goals: 12 weeks  1.Perform weighted ball circles on wall for 1 min each direction at 90 degrees for improved overhead endurance tasks (met)  2.Increase AROM to left shoulder INTERNAL ROTATION to T10 for improved functional movements (met)  3.Increase strength to >/= 5/5 in right shoulder EXTERNAL ROTATION, flexion, abduction to increase tolerance for ADL and work activities. (met)  4. Pt goal: return to golf with </= 1/10 pain in right shoulder and knee (met)  5. Pt will have improved gcode of 20% on FOTO shoulder in order to demonstrate true functional improvement.  (met)  6. Pt will demonstrate improvements in R knee ROM to 0-130-135 for ease with ambulation and household chores. (met)  7. Pt will demonstrate 4+/5 strength within B LE for ease with house hold chores and climbing stairs (met)    Plan     D/c with HEP, advised to F/U with MD if sx worsen.    Luz Swartz, PT

## 2023-06-29 ENCOUNTER — CLINICAL SUPPORT (OUTPATIENT)
Dept: REHABILITATION | Facility: OTHER | Age: 73
End: 2023-06-29
Payer: MEDICARE

## 2023-06-29 DIAGNOSIS — M25.561 RIGHT MEDIAL KNEE PAIN: ICD-10-CM

## 2023-06-29 DIAGNOSIS — M25.611 DECREASED RANGE OF MOTION OF RIGHT SHOULDER: Primary | ICD-10-CM

## 2023-06-29 DIAGNOSIS — R53.1 DECREASED STRENGTH: ICD-10-CM

## 2023-06-29 PROCEDURE — 97530 THERAPEUTIC ACTIVITIES: CPT | Mod: PN

## 2023-06-29 PROCEDURE — 97110 THERAPEUTIC EXERCISES: CPT | Mod: PN

## 2023-10-12 ENCOUNTER — OFFICE VISIT (OUTPATIENT)
Dept: PRIMARY CARE CLINIC | Facility: CLINIC | Age: 73
End: 2023-10-12
Payer: MEDICARE

## 2023-10-12 VITALS
HEART RATE: 71 BPM | HEIGHT: 69 IN | OXYGEN SATURATION: 98 % | SYSTOLIC BLOOD PRESSURE: 144 MMHG | BODY MASS INDEX: 24.72 KG/M2 | RESPIRATION RATE: 18 BRPM | TEMPERATURE: 97 F | DIASTOLIC BLOOD PRESSURE: 70 MMHG | WEIGHT: 166.88 LBS

## 2023-10-12 DIAGNOSIS — F51.01 PRIMARY INSOMNIA: ICD-10-CM

## 2023-10-12 DIAGNOSIS — G89.29 CHRONIC RIGHT SHOULDER PAIN: ICD-10-CM

## 2023-10-12 DIAGNOSIS — N52.9 ERECTILE DYSFUNCTION, UNSPECIFIED ERECTILE DYSFUNCTION TYPE: ICD-10-CM

## 2023-10-12 DIAGNOSIS — E78.5 BORDERLINE HYPERLIPIDEMIA: ICD-10-CM

## 2023-10-12 DIAGNOSIS — I10 BENIGN ESSENTIAL HYPERTENSION: Primary | ICD-10-CM

## 2023-10-12 DIAGNOSIS — M25.511 CHRONIC RIGHT SHOULDER PAIN: ICD-10-CM

## 2023-10-12 PROCEDURE — 1101F PR PT FALLS ASSESS DOC 0-1 FALLS W/OUT INJ PAST YR: ICD-10-PCS | Mod: CPTII,S$GLB,, | Performed by: INTERNAL MEDICINE

## 2023-10-12 PROCEDURE — 3077F PR MOST RECENT SYSTOLIC BLOOD PRESSURE >= 140 MM HG: ICD-10-PCS | Mod: CPTII,S$GLB,, | Performed by: INTERNAL MEDICINE

## 2023-10-12 PROCEDURE — 99999 PR PBB SHADOW E&M-EST. PATIENT-LVL III: CPT | Mod: PBBFAC,,, | Performed by: INTERNAL MEDICINE

## 2023-10-12 PROCEDURE — 1159F MED LIST DOCD IN RCRD: CPT | Mod: CPTII,S$GLB,, | Performed by: INTERNAL MEDICINE

## 2023-10-12 PROCEDURE — 1159F PR MEDICATION LIST DOCUMENTED IN MEDICAL RECORD: ICD-10-PCS | Mod: CPTII,S$GLB,, | Performed by: INTERNAL MEDICINE

## 2023-10-12 PROCEDURE — 1126F PR PAIN SEVERITY QUANTIFIED, NO PAIN PRESENT: ICD-10-PCS | Mod: CPTII,S$GLB,, | Performed by: INTERNAL MEDICINE

## 2023-10-12 PROCEDURE — 3078F DIAST BP <80 MM HG: CPT | Mod: CPTII,S$GLB,, | Performed by: INTERNAL MEDICINE

## 2023-10-12 PROCEDURE — 4010F ACE/ARB THERAPY RXD/TAKEN: CPT | Mod: CPTII,S$GLB,, | Performed by: INTERNAL MEDICINE

## 2023-10-12 PROCEDURE — 1160F RVW MEDS BY RX/DR IN RCRD: CPT | Mod: CPTII,S$GLB,, | Performed by: INTERNAL MEDICINE

## 2023-10-12 PROCEDURE — 3077F SYST BP >= 140 MM HG: CPT | Mod: CPTII,S$GLB,, | Performed by: INTERNAL MEDICINE

## 2023-10-12 PROCEDURE — 3078F PR MOST RECENT DIASTOLIC BLOOD PRESSURE < 80 MM HG: ICD-10-PCS | Mod: CPTII,S$GLB,, | Performed by: INTERNAL MEDICINE

## 2023-10-12 PROCEDURE — 1101F PT FALLS ASSESS-DOCD LE1/YR: CPT | Mod: CPTII,S$GLB,, | Performed by: INTERNAL MEDICINE

## 2023-10-12 PROCEDURE — 4010F PR ACE/ARB THEARPY RXD/TAKEN: ICD-10-PCS | Mod: CPTII,S$GLB,, | Performed by: INTERNAL MEDICINE

## 2023-10-12 PROCEDURE — 1126F AMNT PAIN NOTED NONE PRSNT: CPT | Mod: CPTII,S$GLB,, | Performed by: INTERNAL MEDICINE

## 2023-10-12 PROCEDURE — 3288F PR FALLS RISK ASSESSMENT DOCUMENTED: ICD-10-PCS | Mod: CPTII,S$GLB,, | Performed by: INTERNAL MEDICINE

## 2023-10-12 PROCEDURE — 99214 PR OFFICE/OUTPT VISIT, EST, LEVL IV, 30-39 MIN: ICD-10-PCS | Mod: S$GLB,,, | Performed by: INTERNAL MEDICINE

## 2023-10-12 PROCEDURE — 99999 PR PBB SHADOW E&M-EST. PATIENT-LVL III: ICD-10-PCS | Mod: PBBFAC,,, | Performed by: INTERNAL MEDICINE

## 2023-10-12 PROCEDURE — 99214 OFFICE O/P EST MOD 30 MIN: CPT | Mod: S$GLB,,, | Performed by: INTERNAL MEDICINE

## 2023-10-12 PROCEDURE — 3288F FALL RISK ASSESSMENT DOCD: CPT | Mod: CPTII,S$GLB,, | Performed by: INTERNAL MEDICINE

## 2023-10-12 PROCEDURE — 1160F PR REVIEW ALL MEDS BY PRESCRIBER/CLIN PHARMACIST DOCUMENTED: ICD-10-PCS | Mod: CPTII,S$GLB,, | Performed by: INTERNAL MEDICINE

## 2023-10-12 NOTE — PROGRESS NOTES
[facility] Note    Chief Complaint      Chief Complaint   Patient presents with    Follow-up     6m       History of Present Illness      Gerald Wilkinson is a 73 y.o. male who presents today for   Chief Complaint   Patient presents with    Follow-up     6m   .  Patient comes to appointment here for 6m checkup for chronic issues as below . He is doing great . Had derm visit for skin survey for scalp seeing dr Cornel mata. He is table on prn ambien for insomnia as well . Just completed pt in June for chronic shoulder issues and is doing great ! All labs from last visit reviewed .     HPI    No problem-specific Assessment & Plan notes found for this encounter.       Problem List Items Addressed This Visit          Cardiac/Vascular    Benign essential hypertension - Primary    Overview     bp well controlled cont current          Borderline hyperlipidemia    Overview     Stable on diet only needs repeat labs next visit             Renal/    Erectile dysfunction    Overview     Cont cialis is working well             Orthopedic    Chronic right shoulder pain    Overview      refered to pt for eval and treat is doing much better             Other    Primary insomnia    Overview     ambien prn continues to work well              Past Medical History:  History reviewed. No pertinent past medical history.    Past Surgical History:  History reviewed. No pertinent surgical history.    Family History:  family history includes Cancer in his mother and sister; Hypertension in his mother and sister.     Social History:  Social History     Socioeconomic History    Marital status:    Tobacco Use    Smoking status: Never    Smokeless tobacco: Never   Substance and Sexual Activity    Alcohol use: No    Drug use: Yes    Sexual activity: Yes     Partners: Female     Social Determinants of Health     Physical Activity: Sufficiently Active (8/17/2020)    Exercise Vital Sign     Days of Exercise per Week:  7 days     Minutes of Exercise per Session: 60 min   Stress: No Stress Concern Present (8/17/2020)    [employer] - Occupational Stress Questionnaire     Feeling of Stress : Not at all   Social Connections: Unknown (8/17/2020)    Social Connection and Isolation Panel [NHANES]     Frequency of Communication with Friends and Family: More than three times a week     Frequency of Social Gatherings with Friends and Family: More than three times a week     Active Member of Clubs or Organizations: Yes     Attends Club or Organization Meetings: More than 4 times per year     Marital Status:        Review of Systems:   Review of Systems   Constitutional:  Negative for fever and weight loss.   HENT:  Negative for congestion, hearing loss and sore throat.    Eyes:  Negative for blurred vision.   Respiratory:  Negative for cough and shortness of breath.    Cardiovascular:  Negative for chest pain, palpitations, claudication and leg swelling.   Gastrointestinal:  Negative for abdominal pain, constipation, diarrhea and heartburn.   Genitourinary:  Negative for dysuria.   Musculoskeletal:  Negative for back pain and myalgias.   Skin:  Negative for rash.   Neurological:  Negative for focal weakness and headaches.   Psychiatric/Behavioral:  Negative for depression and suicidal ideas. The patient is not nervous/anxious.         Medications:  Outpatient Encounter Medications as of 10/12/2023   Medication Sig Dispense Refill    multivitamin capsule Take 1 capsule by mouth once daily.      tadalafiL (CIALIS) 20 MG Tab Take 1 tablet (20 mg total) by mouth once daily. 6 tablet 1    valsartan (DIOVAN) 160 MG tablet Take 1 tablet (160 mg total) by mouth once daily. 90 tablet 3    zolpidem (AMBIEN) 5 MG Tab Take 1 tablet (5 mg total) by mouth nightly as needed. 30 tablet 0    [DISCONTINUED] celecoxib (CELEBREX) 200 MG capsule Take 1 capsule (200 mg total) by mouth once daily. (Patient not taking: Reported  "on 10/12/2023) 60 capsule 2     No facility-administered encounter medications on file as of 10/12/2023.       Allergies:  Review of patient's allergies indicates:  No Known Allergies      Physical Exam      Vitals:    10/12/23 1344   BP: (!) 144/70   Pulse: 71   Resp: 18   Temp: 97 °F (36.1 °C)        Vital Signs  Temp: 97 °F (36.1 °C)  Temp Source: Oral  Pulse: 71  Resp: 18  SpO2: 98 %  BP: (!) 144/70  BP Location: Right arm  Patient Position: Sitting  Pain Score: 0-No pain  Height and Weight  Height: 5' 9" (175.3 cm)  Weight: 75.7 kg (166 lb 14.2 oz)  BSA (Calculated - sq m): 1.92 sq meters  BMI (Calculated): 24.6  Weight in (lb) to have BMI = 25: 168.9]     Body mass index is 24.65 kg/m².    Physical Exam  Constitutional:       Appearance: He is well-developed.   HENT:      Head: Normocephalic.   Eyes:      Pupils: Pupils are equal, round, and reactive to light.   Neck:      Thyroid: No thyromegaly.   Cardiovascular:      Rate and Rhythm: Normal rate and regular rhythm.      Heart sounds: No murmur heard.     No friction rub. No gallop.   Pulmonary:      Effort: Pulmonary effort is normal.      Breath sounds: Normal breath sounds.   Abdominal:      General: Bowel sounds are normal.      Palpations: Abdomen is soft.   Musculoskeletal:         General: Normal range of motion.      Cervical back: Normal range of motion.   Skin:     General: Skin is warm and dry.   Neurological:      Mental Status: He is alert and oriented to person, place, and time.      Sensory: No sensory deficit.   Psychiatric:         Behavior: Behavior normal.          Laboratory:  CBC:  No results for input(s): "WBC", "RBC", "HGB", "HCT", "PLT", "MCV", "MCH", "MCHC" in the last 2160 hours.  CMP:  No results for input(s): "GLU", "CALCIUM", "ALBUMIN", "PROT", "NA", "K", "CO2", "CL", "BUN", "ALKPHOS", "ALT", "AST", "BILITOT" in the last 2160 hours.    Invalid input(s): "CREATININ"  URINALYSIS:  No results for input(s): "COLORU", "CLARITYU", " ""SPECGRAV", "PHUR", "PROTEINUA", "GLUCOSEU", "BILIRUBINCON", "BLOODU", "WBCU", "RBCU", "BACTERIA", "MUCUS", "NITRITE", "LEUKOCYTESUR", "UROBILINOGEN", "HYALINECASTS" in the last 2160 hours.   LIPIDS:  No results for input(s): "TSH", "HDL", "CHOL", "TRIG", "LDLCALC", "CHOLHDL", "NONHDLCHOL", "TOTALCHOLEST" in the last 2160 hours.  TSH:  No results for input(s): "TSH" in the last 2160 hours.  A1C:  No results for input(s): "HGBA1C" in the last 2160 hours.    Radiology:        Assessment:     Gerald Wilkinson is a 73 y.o.male with:    Benign essential hypertension    Chronic right shoulder pain    Erectile dysfunction, unspecified erectile dysfunction type    Borderline hyperlipidemia    Primary insomnia                Plan:     Problem List Items Addressed This Visit          Cardiac/Vascular    Benign essential hypertension - Primary    Overview     bp well controlled cont current          Borderline hyperlipidemia    Overview     Stable on diet only needs repeat labs next visit             Renal/    Erectile dysfunction    Overview     Cont cialis is working well             Orthopedic    Chronic right shoulder pain    Overview      refered to pt for eval and treat is doing much better             Other    Primary insomnia    Overview     ambien prn continues to work well             As above, continue current medications and maintain follow up with specialists.  Return to clinic in 6 months.      Frederick W Dantagnan [facility]                  "

## 2023-11-16 ENCOUNTER — PATIENT MESSAGE (OUTPATIENT)
Dept: PRIMARY CARE CLINIC | Facility: CLINIC | Age: 73
End: 2023-11-16
Payer: MEDICARE

## 2023-11-16 VITALS — SYSTOLIC BLOOD PRESSURE: 130 MMHG | DIASTOLIC BLOOD PRESSURE: 70 MMHG

## 2024-03-08 ENCOUNTER — PATIENT MESSAGE (OUTPATIENT)
Dept: PRIMARY CARE CLINIC | Facility: CLINIC | Age: 74
End: 2024-03-08
Payer: MEDICARE

## 2024-03-11 ENCOUNTER — OFFICE VISIT (OUTPATIENT)
Dept: PRIMARY CARE CLINIC | Facility: CLINIC | Age: 74
End: 2024-03-11
Payer: MEDICARE

## 2024-03-11 VITALS
BODY MASS INDEX: 24.56 KG/M2 | SYSTOLIC BLOOD PRESSURE: 138 MMHG | WEIGHT: 165.81 LBS | OXYGEN SATURATION: 98 % | DIASTOLIC BLOOD PRESSURE: 80 MMHG | HEIGHT: 69 IN | HEART RATE: 80 BPM

## 2024-03-11 DIAGNOSIS — R07.9 CHEST PAIN, UNSPECIFIED TYPE: Primary | ICD-10-CM

## 2024-03-11 DIAGNOSIS — I10 BENIGN ESSENTIAL HYPERTENSION: ICD-10-CM

## 2024-03-11 PROCEDURE — 3079F DIAST BP 80-89 MM HG: CPT | Mod: CPTII,S$GLB,, | Performed by: INTERNAL MEDICINE

## 2024-03-11 PROCEDURE — 93005 ELECTROCARDIOGRAM TRACING: CPT | Mod: S$GLB,,, | Performed by: INTERNAL MEDICINE

## 2024-03-11 PROCEDURE — 3075F SYST BP GE 130 - 139MM HG: CPT | Mod: CPTII,S$GLB,, | Performed by: INTERNAL MEDICINE

## 2024-03-11 PROCEDURE — 1126F AMNT PAIN NOTED NONE PRSNT: CPT | Mod: CPTII,S$GLB,, | Performed by: INTERNAL MEDICINE

## 2024-03-11 PROCEDURE — 93010 ELECTROCARDIOGRAM REPORT: CPT | Mod: S$GLB,,, | Performed by: INTERNAL MEDICINE

## 2024-03-11 PROCEDURE — 1159F MED LIST DOCD IN RCRD: CPT | Mod: CPTII,S$GLB,, | Performed by: INTERNAL MEDICINE

## 2024-03-11 PROCEDURE — 99999 PR PBB SHADOW E&M-EST. PATIENT-LVL III: CPT | Mod: PBBFAC,,, | Performed by: INTERNAL MEDICINE

## 2024-03-11 PROCEDURE — 99214 OFFICE O/P EST MOD 30 MIN: CPT | Mod: S$GLB,,, | Performed by: INTERNAL MEDICINE

## 2024-03-11 PROCEDURE — 1160F RVW MEDS BY RX/DR IN RCRD: CPT | Mod: CPTII,S$GLB,, | Performed by: INTERNAL MEDICINE

## 2024-03-11 NOTE — PROGRESS NOTES
[facility] Note    Chief Complaint      Chief Complaint   Patient presents with    Muscle Pain     C/o muscle pain in right upper chest/shoulder        History of Present Illness      Gerald Wilkinson is a 73 y.o. male who presents today for   Chief Complaint   Patient presents with    Muscle Pain     C/o muscle pain in right upper chest/shoulder    .  Patient comes to appointment here for acute visit related to above .he describes as right chest pain , no with activity , he states he has been active with exercise and playing golf . He had been having a stressful job during the time .     HPI    No problem-specific Assessment & Plan notes found for this encounter.       Problem List Items Addressed This Visit          Cardiac/Vascular    Benign essential hypertension    Overview     bp well controlled cont current          Chest pain - Primary    Overview     Atypical chest pain by report   EKG wht rbbb pvc's . No old EKG to compare                Past Medical History:  History reviewed. No pertinent past medical history.    Past Surgical History:  History reviewed. No pertinent surgical history.    Family History:  family history includes Cancer in his mother and sister; Hypertension in his mother and sister.     Social History:  Social History     Socioeconomic History    Marital status:    Tobacco Use    Smoking status: Never    Smokeless tobacco: Never   Substance and Sexual Activity    Alcohol use: No    Drug use: Yes    Sexual activity: Yes     Partners: Female     Social Determinants of Health     Physical Activity: Sufficiently Active (8/17/2020)    Exercise Vital Sign     Days of Exercise per Week: 7 days     Minutes of Exercise per Session: 60 min   Stress: No Stress Concern Present (8/17/2020)    [employer] [employer] - Occupational Stress Questionnaire     Feeling of Stress : Not at all   Social Connections: Unknown (8/17/2020)    Social Connection and  Isolation Panel [NHANES]     Frequency of Communication with Friends and Family: More than three times a week     Frequency of Social Gatherings with Friends and Family: More than three times a week     Active Member of Clubs or Organizations: Yes     Attends Club or Organization Meetings: More than 4 times per year     Marital Status:        Review of Systems:   Review of Systems   Constitutional:  Negative for fever and weight loss.   HENT:  Negative for congestion, hearing loss and sore throat.    Eyes:  Negative for blurred vision.   Respiratory:  Negative for cough and shortness of breath.    Cardiovascular:  Negative for chest pain, palpitations, claudication and leg swelling.   Gastrointestinal:  Negative for abdominal pain, constipation, diarrhea and heartburn.   Genitourinary:  Negative for dysuria.   Musculoskeletal:  Negative for back pain and myalgias.   Skin:  Negative for rash.   Neurological:  Negative for focal weakness and headaches.   Psychiatric/Behavioral:  Negative for depression and suicidal ideas. The patient is not nervous/anxious.         Medications:  Outpatient Encounter Medications as of 3/11/2024   Medication Sig Dispense Refill    multivitamin capsule Take 1 capsule by mouth once daily.      valsartan (DIOVAN) 160 MG tablet Take 1 tablet (160 mg total) by mouth once daily. 90 tablet 3    [DISCONTINUED] tadalafiL (CIALIS) 20 MG Tab Take 1 tablet (20 mg total) by mouth once daily. (Patient not taking: Reported on 3/11/2024) 6 tablet 1    [DISCONTINUED] zolpidem (AMBIEN) 5 MG Tab Take 1 tablet (5 mg total) by mouth nightly as needed. (Patient not taking: Reported on 3/11/2024) 30 tablet 0     No facility-administered encounter medications on file as of 3/11/2024.       Allergies:  Review of patient's allergies indicates:  No Known Allergies      Physical Exam      Vitals:    03/11/24 1310   BP: 138/80   Pulse: 80        Vital Signs  Pulse: 80  SpO2: 98 %  BP: 138/80  Pain Score:  "0-No pain  Height and Weight  Height: 5' 9" (175.3 cm)  Weight: 75.2 kg (165 lb 12.6 oz)  BSA (Calculated - sq m): 1.91 sq meters  BMI (Calculated): 24.5  Weight in (lb) to have BMI = 25: 168.9]     Body mass index is 24.48 kg/m².    Physical Exam  Constitutional:       Appearance: He is well-developed.   HENT:      Head: Normocephalic.   Eyes:      Pupils: Pupils are equal, round, and reactive to light.   Neck:      Thyroid: No thyromegaly.   Cardiovascular:      Rate and Rhythm: Normal rate and regular rhythm.      Heart sounds: No murmur heard.     No friction rub. No gallop.   Pulmonary:      Effort: Pulmonary effort is normal.      Breath sounds: Normal breath sounds.   Abdominal:      General: Bowel sounds are normal.      Palpations: Abdomen is soft.   Musculoskeletal:         General: Normal range of motion.      Cervical back: Normal range of motion.   Skin:     General: Skin is warm and dry.   Neurological:      Mental Status: He is alert and oriented to person, place, and time.      Sensory: No sensory deficit.   Psychiatric:         Behavior: Behavior normal.          Laboratory:  CBC:  No results for input(s): "WBC", "RBC", "HGB", "HCT", "PLT", "MCV", "MCH", "MCHC" in the last 2160 hours.  CMP:  No results for input(s): "GLU", "CALCIUM", "ALBUMIN", "PROT", "NA", "K", "CO2", "CL", "BUN", "ALKPHOS", "ALT", "AST", "BILITOT" in the last 2160 hours.    Invalid input(s): "CREATININ"  URINALYSIS:  No results for input(s): "COLORU", "CLARITYU", "SPECGRAV", "PHUR", "PROTEINUA", "GLUCOSEU", "BILIRUBINCON", "BLOODU", "WBCU", "RBCU", "BACTERIA", "MUCUS", "NITRITE", "LEUKOCYTESUR", "UROBILINOGEN", "HYALINECASTS" in the last 2160 hours.   LIPIDS:  No results for input(s): "TSH", "HDL", "CHOL", "TRIG", "LDLCALC", "CHOLHDL", "NONHDLCHOL", "TOTALCHOLEST" in the last 2160 hours.  TSH:  No results for input(s): "TSH" in the last 2160 hours.  A1C:  No results for input(s): "HGBA1C" in the last 2160 hours.    Radiology:    "     Assessment:     Gerald Wilkinson is a 73 y.o.male with:    Chest pain, unspecified type  -     IN OFFICE EKG 12-LEAD (to [place])    Benign essential hypertension                Plan:     Problem List Items Addressed This Visit          Cardiac/Vascular    Benign essential hypertension    Overview     bp well controlled cont current          Chest pain - Primary    Overview     Atypical chest pain by report   EKG wht rbbb pvc's . No old EKG to compare               As above, continue current medications and maintain follow up with specialists.  Return to clinic in 6 months.      Frederick W Dantagnan [facility]

## 2024-03-12 LAB
OHS QRS DURATION: 128 MS
OHS QTC CALCULATION: 439 MS

## 2024-03-21 ENCOUNTER — PATIENT MESSAGE (OUTPATIENT)
Dept: PRIMARY CARE CLINIC | Facility: CLINIC | Age: 74
End: 2024-03-21
Payer: MEDICARE

## 2024-03-21 DIAGNOSIS — R07.9 CHEST PAIN, UNSPECIFIED TYPE: Primary | ICD-10-CM

## 2024-03-22 ENCOUNTER — HOSPITAL ENCOUNTER (OUTPATIENT)
Dept: CARDIOLOGY | Facility: HOSPITAL | Age: 74
Discharge: HOME | DRG: 234 | End: 2024-03-22
Attending: INTERNAL MEDICINE
Payer: MEDICARE

## 2024-03-22 ENCOUNTER — HOSPITAL ENCOUNTER (INPATIENT)
Facility: HOSPITAL | Age: 74
LOS: 9 days | Discharge: HOME | DRG: 234 | End: 2024-03-31
Attending: STUDENT IN AN ORGANIZED HEALTH CARE EDUCATION/TRAINING PROGRAM | Admitting: STUDENT IN AN ORGANIZED HEALTH CARE EDUCATION/TRAINING PROGRAM
Payer: MEDICARE

## 2024-03-22 DIAGNOSIS — Z95.1 S/P CORONARY ARTERY BYPASS GRAFT X 2: ICD-10-CM

## 2024-03-22 DIAGNOSIS — R07.9 CHEST PAIN: ICD-10-CM

## 2024-03-22 DIAGNOSIS — I25.10 CORONARY ARTERY DISEASE, UNSPECIFIED VESSEL OR LESION TYPE, UNSPECIFIED WHETHER ANGINA PRESENT, UNSPECIFIED WHETHER NATIVE OR TRANSPLANTED HEART: Primary | ICD-10-CM

## 2024-03-22 DIAGNOSIS — I25.10 CORONARY ARTERY DISEASE, UNSPECIFIED VESSEL OR LESION TYPE, UNSPECIFIED WHETHER ANGINA PRESENT, UNSPECIFIED WHETHER NATIVE OR TRANSPLANTED HEART: ICD-10-CM

## 2024-03-22 DIAGNOSIS — Z91.89 AT RISK FOR PROLONGED QT INTERVAL SYNDROME: ICD-10-CM

## 2024-03-22 DIAGNOSIS — R07.9 CHEST PAIN, UNSPECIFIED TYPE: ICD-10-CM

## 2024-03-22 DIAGNOSIS — I48.91 ATRIAL FIBRILLATION WITH RAPID VENTRICULAR RESPONSE: Primary | ICD-10-CM

## 2024-03-22 DIAGNOSIS — I24.0: ICD-10-CM

## 2024-03-22 DIAGNOSIS — I25.10 CORONARY ARTERY DISEASE: ICD-10-CM

## 2024-03-22 PROBLEM — R94.39 POSITIVE CARDIAC STRESS TEST: Status: ACTIVE | Noted: 2024-03-22

## 2024-03-22 PROBLEM — I48.0 PAROXYSMAL ATRIAL FIBRILLATION WITH RAPID VENTRICULAR RESPONSE: Status: ACTIVE | Noted: 2024-03-22

## 2024-03-22 LAB
ABO + RH BLD: NORMAL
ALBUMIN SERPL BCP-MCNC: 4 G/DL (ref 3.5–5.2)
ALLENS TEST: NORMAL
ALP SERPL-CCNC: 39 U/L (ref 55–135)
ALT SERPL W/O P-5'-P-CCNC: 22 U/L (ref 10–44)
ANION GAP SERPL CALC-SCNC: 8 MMOL/L (ref 8–16)
AST SERPL-CCNC: 22 U/L (ref 10–40)
BASOPHILS # BLD AUTO: 0.04 K/UL (ref 0–0.2)
BASOPHILS NFR BLD: 0.5 % (ref 0–1.9)
BILIRUB SERPL-MCNC: 0.8 MG/DL (ref 0.1–1)
BILIRUB UR QL STRIP: NEGATIVE
BLD GP AB SCN CELLS X3 SERPL QL: NORMAL
BNP SERPL-MCNC: 15 PG/ML (ref 0–99)
BUN SERPL-MCNC: 17 MG/DL (ref 8–23)
BUN SERPL-MCNC: 18 MG/DL (ref 6–30)
CALCIUM SERPL-MCNC: 9.3 MG/DL (ref 8.7–10.5)
CHLORIDE SERPL-SCNC: 106 MMOL/L (ref 95–110)
CHLORIDE SERPL-SCNC: 110 MMOL/L (ref 95–110)
CLARITY UR REFRACT.AUTO: CLEAR
CO2 SERPL-SCNC: 22 MMOL/L (ref 23–29)
COLOR UR AUTO: COLORLESS
CREAT SERPL-MCNC: 0.9 MG/DL (ref 0.5–1.4)
CREAT SERPL-MCNC: 0.9 MG/DL (ref 0.5–1.4)
CV STRESS BASE HR: 75 BPM
DIASTOLIC BLOOD PRESSURE: 77 MMHG
DIFFERENTIAL METHOD BLD: ABNORMAL
EOSINOPHIL # BLD AUTO: 0.1 K/UL (ref 0–0.5)
EOSINOPHIL NFR BLD: 1.2 % (ref 0–8)
ERYTHROCYTE [DISTWIDTH] IN BLOOD BY AUTOMATED COUNT: 12.5 % (ref 11.5–14.5)
EST. GFR  (NO RACE VARIABLE): >60 ML/MIN/1.73 M^2
GLUCOSE SERPL-MCNC: 93 MG/DL (ref 70–110)
GLUCOSE SERPL-MCNC: 99 MG/DL (ref 70–110)
GLUCOSE UR QL STRIP: NEGATIVE
HCT VFR BLD AUTO: 48.6 % (ref 40–54)
HCT VFR BLD CALC: 48 %PCV (ref 36–54)
HCV AB SERPL QL IA: NORMAL
HGB BLD-MCNC: 15.8 G/DL (ref 14–18)
HGB UR QL STRIP: NEGATIVE
HIV 1+2 AB+HIV1 P24 AG SERPL QL IA: NORMAL
IMM GRANULOCYTES # BLD AUTO: 0.03 K/UL (ref 0–0.04)
IMM GRANULOCYTES NFR BLD AUTO: 0.4 % (ref 0–0.5)
KETONES UR QL STRIP: NEGATIVE
LDH SERPL L TO P-CCNC: 1.72 MMOL/L (ref 0.5–2.2)
LEUKOCYTE ESTERASE UR QL STRIP: NEGATIVE
LYMPHOCYTES # BLD AUTO: 0.9 K/UL (ref 1–4.8)
LYMPHOCYTES NFR BLD: 12.3 % (ref 18–48)
MAGNESIUM SERPL-MCNC: 2.1 MG/DL (ref 1.6–2.6)
MCH RBC QN AUTO: 29.7 PG (ref 27–31)
MCHC RBC AUTO-ENTMCNC: 32.5 G/DL (ref 32–36)
MCV RBC AUTO: 91 FL (ref 82–98)
MONOCYTES # BLD AUTO: 0.7 K/UL (ref 0.3–1)
MONOCYTES NFR BLD: 8.8 % (ref 4–15)
NEUTROPHILS # BLD AUTO: 5.8 K/UL (ref 1.8–7.7)
NEUTROPHILS NFR BLD: 76.8 % (ref 38–73)
NITRITE UR QL STRIP: NEGATIVE
NRBC BLD-RTO: 0 /100 WBC
OHS CV CPX 1 MINUTE RECOVERY HEART RATE: 108 BPM
OHS CV CPX 85 PERCENT MAX PREDICTED HEART RATE MALE: 123
OHS CV CPX ESTIMATED METS: 7
OHS CV CPX MAX PREDICTED HEART RATE: 145
OHS CV CPX PATIENT IS FEMALE: 0
OHS CV CPX PATIENT IS MALE: 1
OHS CV CPX PEAK DIASTOLIC BLOOD PRESSURE: 77 MMHG
OHS CV CPX PEAK HEAR RATE: 113 BPM
OHS CV CPX PEAK RATE PRESSURE PRODUCT: NORMAL
OHS CV CPX PEAK SYSTOLIC BLOOD PRESSURE: 178 MMHG
OHS CV CPX PERCENT MAX PREDICTED HEART RATE ACHIEVED: 78
OHS CV CPX RATE PRESSURE PRODUCT PRESENTING: NORMAL
OHS QRS DURATION: 124 MS
OHS QTC CALCULATION: 412 MS
PH UR STRIP: 7 [PH] (ref 5–8)
PHOSPHATE SERPL-MCNC: 1.9 MG/DL (ref 2.7–4.5)
PLATELET # BLD AUTO: 187 K/UL (ref 150–450)
PMV BLD AUTO: 10.9 FL (ref 9.2–12.9)
POC CARDIAC TROPONIN I: 0.01 NG/ML (ref 0–0.08)
POC IONIZED CALCIUM: 1.18 MMOL/L (ref 1.06–1.42)
POC TCO2 (MEASURED): 23 MMOL/L (ref 23–29)
POTASSIUM BLD-SCNC: 4 MMOL/L (ref 3.5–5.1)
POTASSIUM SERPL-SCNC: 4 MMOL/L (ref 3.5–5.1)
PROT SERPL-MCNC: 7.1 G/DL (ref 6–8.4)
PROT UR QL STRIP: NEGATIVE
RBC # BLD AUTO: 5.32 M/UL (ref 4.6–6.2)
SAMPLE: NORMAL
SITE: NORMAL
SODIUM BLD-SCNC: 141 MMOL/L (ref 136–145)
SODIUM SERPL-SCNC: 140 MMOL/L (ref 136–145)
SP GR UR STRIP: 1.01 (ref 1–1.03)
SPECIMEN OUTDATE: NORMAL
STRESS ECHO POST EXERCISE DUR MIN: 5 MINUTES
STRESS ECHO POST EXERCISE DUR SEC: 42 SECONDS
STRESS ST DEPRESSION: 5 MM
SYSTOLIC BLOOD PRESSURE: 178 MMHG
TROPONIN I SERPL DL<=0.01 NG/ML-MCNC: 0.02 NG/ML (ref 0–0.03)
TROPONIN I SERPL DL<=0.01 NG/ML-MCNC: <0.006 NG/ML (ref 0–0.03)
URN SPEC COLLECT METH UR: ABNORMAL
WBC # BLD AUTO: 7.59 K/UL (ref 3.9–12.7)

## 2024-03-22 PROCEDURE — 96374 THER/PROPH/DIAG INJ IV PUSH: CPT

## 2024-03-22 PROCEDURE — 84100 ASSAY OF PHOSPHORUS: CPT

## 2024-03-22 PROCEDURE — 93010 ELECTROCARDIOGRAM REPORT: CPT | Mod: ,,, | Performed by: INTERNAL MEDICINE

## 2024-03-22 PROCEDURE — B2111ZZ FLUOROSCOPY OF MULTIPLE CORONARY ARTERIES USING LOW OSMOLAR CONTRAST: ICD-10-PCS | Performed by: INTERNAL MEDICINE

## 2024-03-22 PROCEDURE — 94761 N-INVAS EAR/PLS OXIMETRY MLT: CPT

## 2024-03-22 PROCEDURE — 87389 HIV-1 AG W/HIV-1&-2 AB AG IA: CPT | Performed by: PHYSICIAN ASSISTANT

## 2024-03-22 PROCEDURE — 63600175 PHARM REV CODE 636 W HCPCS: Performed by: INTERNAL MEDICINE

## 2024-03-22 PROCEDURE — 93018 CV STRESS TEST I&R ONLY: CPT | Mod: ,,, | Performed by: INTERNAL MEDICINE

## 2024-03-22 PROCEDURE — 25000003 PHARM REV CODE 250: Performed by: INTERNAL MEDICINE

## 2024-03-22 PROCEDURE — 93458 L HRT ARTERY/VENTRICLE ANGIO: CPT | Mod: 26,,, | Performed by: INTERNAL MEDICINE

## 2024-03-22 PROCEDURE — 25500020 PHARM REV CODE 255: Performed by: INTERNAL MEDICINE

## 2024-03-22 PROCEDURE — 83735 ASSAY OF MAGNESIUM: CPT

## 2024-03-22 PROCEDURE — 25000003 PHARM REV CODE 250: Performed by: STUDENT IN AN ORGANIZED HEALTH CARE EDUCATION/TRAINING PROGRAM

## 2024-03-22 PROCEDURE — 99900035 HC TECH TIME PER 15 MIN (STAT)

## 2024-03-22 PROCEDURE — 81003 URINALYSIS AUTO W/O SCOPE: CPT

## 2024-03-22 PROCEDURE — 93016 CV STRESS TEST SUPVJ ONLY: CPT | Mod: ,,, | Performed by: INTERNAL MEDICINE

## 2024-03-22 PROCEDURE — 99223 1ST HOSP IP/OBS HIGH 75: CPT | Mod: 25,GC,, | Performed by: INTERNAL MEDICINE

## 2024-03-22 PROCEDURE — 80053 COMPREHEN METABOLIC PANEL: CPT

## 2024-03-22 PROCEDURE — 4A023N7 MEASUREMENT OF CARDIAC SAMPLING AND PRESSURE, LEFT HEART, PERCUTANEOUS APPROACH: ICD-10-PCS | Performed by: INTERNAL MEDICINE

## 2024-03-22 PROCEDURE — 93458 L HRT ARTERY/VENTRICLE ANGIO: CPT | Performed by: INTERNAL MEDICINE

## 2024-03-22 PROCEDURE — 82803 BLOOD GASES ANY COMBINATION: CPT

## 2024-03-22 PROCEDURE — C1894 INTRO/SHEATH, NON-LASER: HCPCS | Performed by: INTERNAL MEDICINE

## 2024-03-22 PROCEDURE — 93017 CV STRESS TEST TRACING ONLY: CPT

## 2024-03-22 PROCEDURE — 83880 ASSAY OF NATRIURETIC PEPTIDE: CPT

## 2024-03-22 PROCEDURE — C1887 CATHETER, GUIDING: HCPCS | Performed by: INTERNAL MEDICINE

## 2024-03-22 PROCEDURE — 84484 ASSAY OF TROPONIN QUANT: CPT | Performed by: STUDENT IN AN ORGANIZED HEALTH CARE EDUCATION/TRAINING PROGRAM

## 2024-03-22 PROCEDURE — 25000003 PHARM REV CODE 250

## 2024-03-22 PROCEDURE — 99152 MOD SED SAME PHYS/QHP 5/>YRS: CPT | Performed by: INTERNAL MEDICINE

## 2024-03-22 PROCEDURE — C1769 GUIDE WIRE: HCPCS | Performed by: INTERNAL MEDICINE

## 2024-03-22 PROCEDURE — 86803 HEPATITIS C AB TEST: CPT | Performed by: PHYSICIAN ASSISTANT

## 2024-03-22 PROCEDURE — 85025 COMPLETE CBC W/AUTO DIFF WBC: CPT

## 2024-03-22 PROCEDURE — 93005 ELECTROCARDIOGRAM TRACING: CPT

## 2024-03-22 PROCEDURE — 84484 ASSAY OF TROPONIN QUANT: CPT | Mod: 91

## 2024-03-22 PROCEDURE — 86901 BLOOD TYPING SEROLOGIC RH(D): CPT | Performed by: STUDENT IN AN ORGANIZED HEALTH CARE EDUCATION/TRAINING PROGRAM

## 2024-03-22 PROCEDURE — 83605 ASSAY OF LACTIC ACID: CPT

## 2024-03-22 PROCEDURE — 99285 EMERGENCY DEPT VISIT HI MDM: CPT | Mod: 25

## 2024-03-22 PROCEDURE — 99152 MOD SED SAME PHYS/QHP 5/>YRS: CPT | Mod: ,,, | Performed by: INTERNAL MEDICINE

## 2024-03-22 PROCEDURE — 20000000 HC ICU ROOM

## 2024-03-22 RX ORDER — HEPARIN SODIUM 1000 [USP'U]/ML
INJECTION, SOLUTION INTRAVENOUS; SUBCUTANEOUS
Status: DISCONTINUED | OUTPATIENT
Start: 2024-03-22 | End: 2024-03-22 | Stop reason: HOSPADM

## 2024-03-22 RX ORDER — ASPIRIN 325 MG
325 TABLET ORAL
Status: COMPLETED | OUTPATIENT
Start: 2024-03-22 | End: 2024-03-22

## 2024-03-22 RX ORDER — IBUPROFEN 200 MG
24 TABLET ORAL
Status: DISCONTINUED | OUTPATIENT
Start: 2024-03-22 | End: 2024-03-28

## 2024-03-22 RX ORDER — METOPROLOL TARTRATE 1 MG/ML
INJECTION, SOLUTION INTRAVENOUS
Status: DISCONTINUED | OUTPATIENT
Start: 2024-03-22 | End: 2024-03-22 | Stop reason: HOSPADM

## 2024-03-22 RX ORDER — SODIUM CHLORIDE 9 MG/ML
INJECTION, SOLUTION INTRAVENOUS CONTINUOUS
Status: DISCONTINUED | OUTPATIENT
Start: 2024-03-22 | End: 2024-03-22

## 2024-03-22 RX ORDER — SODIUM CHLORIDE 0.9 % (FLUSH) 0.9 %
10 SYRINGE (ML) INJECTION EVERY 8 HOURS PRN
Status: DISCONTINUED | OUTPATIENT
Start: 2024-03-22 | End: 2024-03-31 | Stop reason: HOSPADM

## 2024-03-22 RX ORDER — METOPROLOL TARTRATE 1 MG/ML
5 INJECTION, SOLUTION INTRAVENOUS EVERY 6 HOURS PRN
Status: DISCONTINUED | OUTPATIENT
Start: 2024-03-22 | End: 2024-03-22

## 2024-03-22 RX ORDER — VALSARTAN 40 MG/1
160 TABLET ORAL DAILY
Status: DISCONTINUED | OUTPATIENT
Start: 2024-03-23 | End: 2024-03-23

## 2024-03-22 RX ORDER — METOPROLOL TARTRATE 50 MG/1
50 TABLET ORAL
Status: COMPLETED | OUTPATIENT
Start: 2024-03-22 | End: 2024-03-22

## 2024-03-22 RX ORDER — GLUCAGON 1 MG
1 KIT INJECTION
Status: DISCONTINUED | OUTPATIENT
Start: 2024-03-22 | End: 2024-03-28

## 2024-03-22 RX ORDER — ATORVASTATIN CALCIUM 40 MG/1
80 TABLET, FILM COATED ORAL DAILY
Status: DISCONTINUED | OUTPATIENT
Start: 2024-03-23 | End: 2024-03-31 | Stop reason: HOSPADM

## 2024-03-22 RX ORDER — IPRATROPIUM BROMIDE AND ALBUTEROL SULFATE 2.5; .5 MG/3ML; MG/3ML
3 SOLUTION RESPIRATORY (INHALATION) EVERY 6 HOURS PRN
Status: DISCONTINUED | OUTPATIENT
Start: 2024-03-22 | End: 2024-03-31 | Stop reason: HOSPADM

## 2024-03-22 RX ORDER — IBUPROFEN 200 MG
16 TABLET ORAL
Status: DISCONTINUED | OUTPATIENT
Start: 2024-03-22 | End: 2024-03-28

## 2024-03-22 RX ORDER — LIDOCAINE HYDROCHLORIDE 20 MG/ML
INJECTION, SOLUTION INFILTRATION; PERINEURAL
Status: DISCONTINUED | OUTPATIENT
Start: 2024-03-22 | End: 2024-03-22 | Stop reason: HOSPADM

## 2024-03-22 RX ORDER — POLYETHYLENE GLYCOL 3350 17 G/17G
17 POWDER, FOR SOLUTION ORAL DAILY
Status: DISCONTINUED | OUTPATIENT
Start: 2024-03-22 | End: 2024-03-27

## 2024-03-22 RX ORDER — ACETAMINOPHEN 325 MG/1
650 TABLET ORAL EVERY 6 HOURS PRN
Status: DISCONTINUED | OUTPATIENT
Start: 2024-03-22 | End: 2024-03-27

## 2024-03-22 RX ORDER — SODIUM CHLORIDE 0.9 % (FLUSH) 0.9 %
10 SYRINGE (ML) INJECTION
Status: DISCONTINUED | OUTPATIENT
Start: 2024-03-22 | End: 2024-03-28

## 2024-03-22 RX ORDER — SODIUM CHLORIDE 9 MG/ML
INJECTION, SOLUTION INTRAVENOUS CONTINUOUS
Status: ACTIVE | OUTPATIENT
Start: 2024-03-22 | End: 2024-03-22

## 2024-03-22 RX ORDER — HYDRALAZINE HYDROCHLORIDE 25 MG/1
25 TABLET, FILM COATED ORAL EVERY 8 HOURS PRN
Status: DISCONTINUED | OUTPATIENT
Start: 2024-03-22 | End: 2024-03-27

## 2024-03-22 RX ORDER — ASPIRIN 81 MG/1
81 TABLET ORAL DAILY
Status: DISCONTINUED | OUTPATIENT
Start: 2024-03-23 | End: 2024-03-27

## 2024-03-22 RX ORDER — DIPHENHYDRAMINE HCL 50 MG
50 CAPSULE ORAL ONCE
Status: COMPLETED | OUTPATIENT
Start: 2024-03-22 | End: 2024-03-22

## 2024-03-22 RX ORDER — HEPARIN SOD,PORCINE/0.9 % NACL 1000/500ML
INTRAVENOUS SOLUTION INTRAVENOUS
Status: DISCONTINUED | OUTPATIENT
Start: 2024-03-22 | End: 2024-03-22 | Stop reason: HOSPADM

## 2024-03-22 RX ORDER — FENTANYL CITRATE 50 UG/ML
INJECTION, SOLUTION INTRAMUSCULAR; INTRAVENOUS
Status: DISCONTINUED | OUTPATIENT
Start: 2024-03-22 | End: 2024-03-22 | Stop reason: HOSPADM

## 2024-03-22 RX ORDER — PROCHLORPERAZINE EDISYLATE 5 MG/ML
5 INJECTION INTRAMUSCULAR; INTRAVENOUS EVERY 6 HOURS PRN
Status: DISCONTINUED | OUTPATIENT
Start: 2024-03-22 | End: 2024-03-27

## 2024-03-22 RX ORDER — ONDANSETRON 8 MG/1
8 TABLET, ORALLY DISINTEGRATING ORAL EVERY 8 HOURS PRN
Status: DISCONTINUED | OUTPATIENT
Start: 2024-03-22 | End: 2024-03-27

## 2024-03-22 RX ORDER — CLOPIDOGREL BISULFATE 300 MG/1
600 TABLET, FILM COATED ORAL ONCE
Status: COMPLETED | OUTPATIENT
Start: 2024-03-22 | End: 2024-03-22

## 2024-03-22 RX ORDER — TALC
6 POWDER (GRAM) TOPICAL NIGHTLY PRN
Status: DISCONTINUED | OUTPATIENT
Start: 2024-03-22 | End: 2024-03-31 | Stop reason: HOSPADM

## 2024-03-22 RX ORDER — METOPROLOL TARTRATE 1 MG/ML
2.5 INJECTION, SOLUTION INTRAVENOUS EVERY 6 HOURS PRN
Status: DISCONTINUED | OUTPATIENT
Start: 2024-03-22 | End: 2024-03-27

## 2024-03-22 RX ORDER — METOPROLOL TARTRATE 1 MG/ML
5 INJECTION, SOLUTION INTRAVENOUS
Status: COMPLETED | OUTPATIENT
Start: 2024-03-22 | End: 2024-03-22

## 2024-03-22 RX ORDER — NALOXONE HCL 0.4 MG/ML
0.02 VIAL (ML) INJECTION
Status: DISCONTINUED | OUTPATIENT
Start: 2024-03-22 | End: 2024-03-31 | Stop reason: HOSPADM

## 2024-03-22 RX ORDER — MIDAZOLAM HYDROCHLORIDE 1 MG/ML
INJECTION, SOLUTION INTRAMUSCULAR; INTRAVENOUS
Status: DISCONTINUED | OUTPATIENT
Start: 2024-03-22 | End: 2024-03-22 | Stop reason: HOSPADM

## 2024-03-22 RX ADMIN — SODIUM CHLORIDE: 0.9 INJECTION, SOLUTION INTRAVENOUS at 04:03

## 2024-03-22 RX ADMIN — DIPHENHYDRAMINE HYDROCHLORIDE 50 MG: 50 CAPSULE ORAL at 02:03

## 2024-03-22 RX ADMIN — SODIUM CHLORIDE: 0.9 INJECTION, SOLUTION INTRAVENOUS at 01:03

## 2024-03-22 RX ADMIN — CLOPIDOGREL BISULFATE 600 MG: 300 TABLET, FILM COATED ORAL at 01:03

## 2024-03-22 RX ADMIN — ASPIRIN 325 MG ORAL TABLET 325 MG: 325 PILL ORAL at 11:03

## 2024-03-22 RX ADMIN — METOROPROLOL TARTRATE 5 MG: 5 INJECTION, SOLUTION INTRAVENOUS at 11:03

## 2024-03-22 RX ADMIN — METOPROLOL TARTRATE 50 MG: 50 TABLET, FILM COATED ORAL at 11:03

## 2024-03-22 NOTE — ASSESSMENT & PLAN NOTE
Interventional cardiology consulted with cath demonstrating LM disease. S/p ASA & Plavix load. Lipid panel 3 mo. prior WNL.    - continue Heparin gtt  - Atorvastatin 80 mg daily  - beta blocker and ARB   - Cardiac rehab and general cardiology follow-up on DC

## 2024-03-22 NOTE — ED NOTES
Patient identifiers for Gerald Wilkinson 73 y.o. male checked and correct.  Chief Complaint   Patient presents with    Chest Pain     Pt arrives from [facility] where he was undergoing a stress test. He developed chest pain and received 2 sprays of NTG prior to EMS arrival.      No past medical history on file.  Allergies reported: Review of patient's allergies indicates:  No Known Allergies      LOC: Patient is awake, alert, and aware of environment with an appropriate affect. Patient is oriented x 4 and speaking appropriately.  APPEARANCE: Patient resting comfortably and in no acute distress. Patient is clean and well groomed, patient's clothing is properly fastened.  HEENT: - JVD, + midline trach  SKIN: The skin is warm and dry. Patient has normal skin turgor and moist mucus membranes.   MUSKULOSKELETAL: Patient is moving all extremities well, no obvious deformities noted. Pulses intact. PMS x 4  RESPIRATORY: Airway is open and patent. Respirations are spontaneous and non-labored with normal effort and rate. = CBBS, denies SOB  CARDIAC: Patient has A-Fib RVR with a rate of 130. Placed on cardiac monitor. No peripheral edema noted. + 2 bilateral pedal and radial pulses, < 3 s cap refill.  ABDOMEN: No distention noted. Soft and non-tender upon palpation.  NEUROLOGICAL: pupils 5 mm, PERRL. Facial expression is symmetrical. Hand grasps are equal bilaterally. Normal sensation in all extremities when touched with finger.

## 2024-03-22 NOTE — SUBJECTIVE & OBJECTIVE
History reviewed. No pertinent past medical history.    History reviewed. No pertinent surgical history.    Review of patient's allergies indicates:  No Known Allergies    No current facility-administered medications on file prior to encounter.     Current Outpatient Medications on File Prior to Encounter   Medication Sig    multivitamin capsule Take 1 capsule by mouth once daily.    valsartan (DIOVAN) 160 MG tablet Take 1 tablet (160 mg total) by mouth once daily.     Family History       Problem Relation (Age of Onset)    Cancer Mother, Sister    Hypertension Mother, Sister          Tobacco Use    Smoking status: Never    Smokeless tobacco: Never   Substance and Sexual Activity    Alcohol use: No    Drug use: Yes    Sexual activity: Yes     Partners: Female     Review of Systems   Cardiovascular:  Positive for chest pain.   All other systems reviewed and are negative.    Objective:     Vital Signs (Most Recent):  Temp: 98.3 °F (36.8 °C) (03/22/24 1030)  Pulse: (!) 130 (03/22/24 1030)  Resp: 19 (03/22/24 1030)  BP: 138/75 (03/22/24 1030)  SpO2: 96 % (03/22/24 1030) Vital Signs (24h Range):  Temp:  [98.1 °F (36.7 °C)-98.3 °F (36.8 °C)] 98.3 °F (36.8 °C)  Pulse:  [113-130] 130  Resp:  [15-19] 19  SpO2:  [96 %-99 %] 96 %  BP: (130-138)/(75-88) 138/75        There is no height or weight on file to calculate BMI.    SpO2: 96 %       No intake or output data in the 24 hours ending 03/22/24 1044    Lines/Drains/Airways       Peripheral Intravenous Line  Duration                  Peripheral IV - Single Lumen 10/02/20 1107 20 G Left Antecubital 1266 days                     Physical Exam  Constitutional:       Appearance: Normal appearance. He is not ill-appearing.   HENT:      Head: Normocephalic.      Mouth/Throat:      Mouth: Mucous membranes are moist.   Eyes:      Extraocular Movements: Extraocular movements intact.   Cardiovascular:      Rate and Rhythm: Tachycardia present. Rhythm irregular.      Pulses: Normal  "pulses.   Pulmonary:      Effort: Pulmonary effort is normal.   Abdominal:      Palpations: Abdomen is soft.   Musculoskeletal:      Cervical back: Neck supple.      Right lower leg: No edema.      Left lower leg: No edema.   Skin:     General: Skin is warm.   Neurological:      Mental Status: He is alert and oriented to person, place, and time.          Significant Labs: BMP:   Recent Labs   Lab 03/22/24  1109 03/23/24  0230   GLU 93 91    141   K 4.0 4.2    111*   CO2 22* 20*   BUN 17 18   CREATININE 0.9 0.8   CALCIUM 9.3 9.1   MG 2.1 2.1   , CMP   Recent Labs   Lab 03/22/24  1109 03/23/24  0230    141   K 4.0 4.2    111*   CO2 22* 20*   GLU 93 91   BUN 17 18   CREATININE 0.9 0.8   CALCIUM 9.3 9.1   PROT 7.1 6.7   ALBUMIN 4.0 3.6   BILITOT 0.8 0.7   ALKPHOS 39* 40*   AST 22 22   ALT 22 20   ANIONGAP 8 10   , CBC   Recent Labs   Lab 03/22/24  1109 03/23/24  0230   WBC 7.59 8.49   HGB 15.8 15.5   HCT 48.6 47.8    199   , INR No results for input(s): "INR", "PROTIME" in the last 48 hours., Lipid Panel No results for input(s): "CHOL", "HDL", "LDLCALC", "TRIG", "CHOLHDL" in the last 48 hours., and Troponin   Recent Labs   Lab 03/22/24  1109 03/22/24  1336   TROPONINI <0.006 0.022       Significant Imaging:   Reviewed  "

## 2024-03-22 NOTE — ED NOTES
Assumed care of pt at this time. RR even and unlabored. Resting in bed comfortably. No voiced compaints of pain or discomfort at this time. Safety protocols remain.  Patient remains on continuous cardiac monitoring, pulse ox and BP cuff.  Wife at bedside.  Patient also remains in gown.  Report taken from BRODIE Quiroga.  White board updated.  Patient updated on plan of care.     Gerald Wilkinson, a 73 y.o. male presents to the ED w/ complaint of CP while receiving stress test outpatient.     Triage note:  Chief Complaint   Patient presents with    Chest Pain     Pt arrives from [facility] where he was undergoing a stress test. He developed chest pain and received 2 sprays of NTG prior to EMS arrival.      Review of patient's allergies indicates:  No Known Allergies  History reviewed. No pertinent past medical history.

## 2024-03-22 NOTE — ASSESSMENT & PLAN NOTE
New onset; CHADSVASC 4 (age, HTN, CAD).     - Start Lopressor 25 mg BID and IV metoprolol; goal HR < 120 BPM  - Heparin gtt for anticoagulation can convert to DOAC afterwards  - f/u TTE  - Monitor on telemetry  - K>4, Mag>2  - EP referral on DC

## 2024-03-22 NOTE — ASSESSMENT & PLAN NOTE
Patient with Paroxysmal (<7 days) atrial fibrillation which is controlled currently with Beta Blocker. Patient is currently in sinus rhythm.[id number] Score: 2. HASBLED Score: . Anticoagulation not indicated due to Low [id number] .    -New onset  -EKG with atrial fibrillation RVR  -s/p IV metoprolol push followed by po lopressor 50mg with improvement  Plan:  -IV metoprolol prn ordered for now  -Monitor on tele  -[facility] as per above

## 2024-03-22 NOTE — SUBJECTIVE & OBJECTIVE
No past medical history on file.    No past surgical history on file.    Review of patient's allergies indicates:  No Known Allergies    (Not in a hospital admission)    Family History       Problem Relation (Age of Onset)    Cancer Mother, Sister    Hypertension Mother, Sister          Tobacco Use    Smoking status: Never    Smokeless tobacco: Never   Substance and Sexual Activity    Alcohol use: No    Drug use: Yes    Sexual activity: Yes     Partners: Female     Review of Systems   Constitutional: Negative for chills and fever.   HENT: Negative.     Cardiovascular:  Negative for chest pain.   Respiratory:  Negative for shortness of breath.    Endocrine: Negative.    Hematologic/Lymphatic: Negative.    Musculoskeletal: Negative.    All other systems reviewed and are negative.    Objective:     Vital Signs (Most Recent):    Vital Signs (24h Range):  BP: ()/()   Arterial Line BP: ()/()         There is no height or weight on file to calculate BMI.            No intake or output data in the 24 hours ending 03/22/24 1014    Lines/Drains/Airways       Peripheral Intravenous Line  Duration                  Peripheral IV - Single Lumen 10/02/20 1107 20 G Left Antecubital 1266 days                     Physical Exam  Constitutional:       General: He is not in acute distress.     Appearance: Normal appearance.   HENT:      Head: Normocephalic.      Mouth/Throat:      Mouth: Mucous membranes are moist.   Eyes:      Extraocular Movements: Extraocular movements intact.   Cardiovascular:      Rate and Rhythm: Normal rate and regular rhythm.      Pulses: Normal pulses.      Heart sounds: No murmur heard.  Pulmonary:      Effort: Pulmonary effort is normal. No respiratory distress.      Breath sounds: Normal breath sounds.   Abdominal:      Palpations: Abdomen is soft.   Musculoskeletal:      Cervical back: Neck supple.      Right lower leg: No edema.      Left lower leg: No edema.   Skin:     General: Skin is warm.  "  Neurological:      Mental Status: He is alert and oriented to person, place, and time. Mental status is at baseline.   Psychiatric:         Mood and Affect: Mood normal.         Behavior: Behavior normal.         Thought Content: Thought content normal.            Significant Labs: BMP: No results for input(s): "GLU", "NA", "K", "CL", "CO2", "BUN", "CREATININE", "CALCIUM", "MG" in the last 48 hours., CMP No results for input(s): "NA", "K", "CL", "CO2", "GLU", "BUN", "CREATININE", "CALCIUM", "PROT", "ALBUMIN", "BILITOT", "ALKPHOS", "AST", "ALT", "ANIONGAP", "ESTGFRAFRICA", "EGFRNONAA" in the last 48 hours., CBC No results for input(s): "WBC", "HGB", "HCT", "PLT" in the last 48 hours., INR No results for input(s): "INR", "PROTIME" in the last 48 hours., Lipid Panel No results for input(s): "CHOL", "HDL", "LDLCALC", "TRIG", "CHOLHDL" in the last 48 hours., and Troponin No results for input(s): "TROPONINI" in the last 48 hours.    Significant Imaging:   Reviewed  "

## 2024-03-22 NOTE — HPI
Mr Wilkinson is a 72 yo M with PMH HTN that is presenting to the ED this morning after a positive exercise stress test. He had been having some chest discomfort at rest and saw his PCP 3/11. Baseline EKG with RBBB, but developed diffuse ST depressions with DELICIA in AVR. Contacted by Dr Howard and advised to go to the ED for coronary angiogram today.     In the ED, -130s, but vitally stable. EKG with Afib with RVR with RBBB. Cardiology consulted for positive stress test and Afib.

## 2024-03-22 NOTE — SUBJECTIVE & OBJECTIVE
History reviewed. No pertinent past medical history.    History reviewed. No pertinent surgical history.    Review of patient's allergies indicates:  No Known Allergies    No current facility-administered medications on file prior to encounter.     Current Outpatient Medications on File Prior to Encounter   Medication Sig    multivitamin capsule Take 1 capsule by mouth once daily.    valsartan (DIOVAN) 160 MG tablet Take 1 tablet (160 mg total) by mouth once daily.     Family History       Problem Relation (Age of Onset)    Cancer Mother, Sister    Hypertension Mother, Sister          Tobacco Use    Smoking status: Never    Smokeless tobacco: Never   Substance and Sexual Activity    Alcohol use: No    Drug use: Yes    Sexual activity: Yes     Partners: Female     Review of Systems   Constitutional:  Negative for activity change and appetite change.   HENT:  Negative for congestion and dental problem.    Eyes:  Negative for discharge and itching.   Respiratory:  Positive for chest tightness. Negative for apnea, shortness of breath and wheezing.    Cardiovascular:  Positive for chest pain. Negative for leg swelling.   Gastrointestinal:  Negative for abdominal distention and abdominal pain.   Endocrine: Negative for cold intolerance and heat intolerance.   Genitourinary:  Negative for difficulty urinating and dysuria.   Musculoskeletal:  Negative for arthralgias and back pain.   Skin:  Negative for color change and pallor.   Allergic/Immunologic: Negative for environmental allergies and food allergies.   Neurological:  Negative for dizziness and facial asymmetry.   Hematological:  Negative for adenopathy. Does not bruise/bleed easily.   Psychiatric/Behavioral:  Negative for agitation and behavioral problems.      Objective:     Vital Signs (Most Recent):  Temp: 98.3 °F (36.8 °C) (03/22/24 1030)  Pulse: 89 (03/22/24 1232)  Resp: 16 (03/22/24 1232)  BP: (!) 150/85 (03/22/24 1232)  SpO2: 95 % (03/22/24 1232) Vital Signs  (24h Range):  Temp:  [98.1 °F (36.7 °C)-98.3 °F (36.8 °C)] 98.3 °F (36.8 °C)  Pulse:  [] 89  Resp:  [15-20] 16  SpO2:  [95 %-99 %] 95 %  BP: (130-180)/(75-98) 150/85        There is no height or weight on file to calculate BMI.     Physical Exam  Constitutional:       Appearance: Normal appearance.   HENT:      Head: Normocephalic and atraumatic.      Right Ear: Tympanic membrane normal.      Left Ear: Tympanic membrane normal.   Eyes:      Extraocular Movements: Extraocular movements intact.      Conjunctiva/sclera: Conjunctivae normal.      Pupils: Pupils are equal, round, and reactive to light.   Cardiovascular:      Rate and Rhythm: Tachycardia present. Rhythm irregular.      Pulses: Normal pulses.      Heart sounds: Normal heart sounds.   Pulmonary:      Effort: Pulmonary effort is normal. No respiratory distress.      Breath sounds: Normal breath sounds. No wheezing.   Abdominal:      General: Abdomen is flat. Bowel sounds are normal. There is no distension.      Tenderness: There is no abdominal tenderness.   Musculoskeletal:         General: No swelling or tenderness. Normal range of motion.      Cervical back: Normal range of motion.      Right lower leg: No edema.      Left lower leg: No edema.   Skin:     General: Skin is warm and dry.      Capillary Refill: Capillary refill takes less than 2 seconds.   Neurological:      General: No focal deficit present.      Mental Status: He is alert and oriented to person, place, and time.      Cranial Nerves: No cranial nerve deficit.   Psychiatric:         Mood and Affect: Mood normal.         Behavior: Behavior normal.         Thought Content: Thought content normal.              CRANIAL NERVES     CN III, IV, VI   Pupils are equal, round, and reactive to light.       Significant Labs: All pertinent labs within the past 24 hours have been reviewed.    Significant Imaging: I have reviewed all pertinent imaging results/findings within the past 24 hours.

## 2024-03-22 NOTE — ED NOTES
I-STAT Chem-8+ Results:   Value Reference Range   Sodium 141 136-145 mmol/L   Potassium  4.0 3.5-5.1 mmol/L   Chloride 106  mmol/L   Ionized Calcium 1.18 1.06-1.42 mmol/L   CO2 (measured) 23 23-29 mmol/L   Glucose 99  mg/dL   BUN 18 6-30 mg/dL   Creatinine 0.9 0.5-1.4 mg/dL   Hematocrit 48 36-54%

## 2024-03-22 NOTE — ASSESSMENT & PLAN NOTE
Chronic, controlled. Latest blood pressure and vitals reviewed-     Temp:  [98.1 °F (36.7 °C)-98.3 °F (36.8 °C)]   Pulse:  []   Resp:  [15-20]   BP: (130-180)/(75-98)   SpO2:  [95 %-99 %] .   Home meds for hypertension were reviewed and noted below.   Hypertension Medications               valsartan (DIOVAN) 160 MG tablet Take 1 tablet (160 mg total) by mouth once daily.            While in the hospital, will manage blood pressure as follows; Continue home antihypertensive regimen    Will utilize p.r.n. blood pressure medication only if patient's blood pressure greater than 180/110 and he develops symptoms such as worsening chest pain or shortness of breath.

## 2024-03-22 NOTE — Clinical Note
Diagnosis: Chest pain [425239]   Future Attending Provider: BHARTI RICHTER [[id number]]   Reason for IP Medical Treatment  (Clinical interventions that can only be accomplished in the IP setting? ) :: CP, abnormal EKG   I certify that Inpatient services for greater than or equal to 2 midnights are medically necessary:: Yes   Plans for Post-Acute care--if anticipated (pick the single best option):: A. No post acute care anticipated at this time

## 2024-03-22 NOTE — HPI
This is a 73-year-old gentleman with a history of HTN who presents to the ED after positive stress test.  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  About 10 days ago he says he presented to PCP where EKG was done and was unremarkable.  Opted to watch and wait.  About 2 days ago while he was lifting equipment at work he started experiencing chest tightness, across his chest, more severe than usual.  After resolved, he experienced tightness once again.  Describes as 5/10 in severity, nonradiating.  Previously located around his right armpit, now near the center of his chest.  He then message to his PCP, stress test was performed the next day on 03/22.  Reported chest discomfort during stress test with EKG changes, was referred to the ED with planned LHC from Interventional Cardiology.    ED course: Troponins negative on arrival with no active chest pain. Loaded w ASA/plavis. Found to be in new onset atrial fibrillation w RVR, status post IV metoprolol push followed by po lopressor 50mg with improvement

## 2024-03-22 NOTE — ED NOTES
Telemetry Verification   Patient placed on Telemetry Box  Verified with War Room  Box # [id number]   Monitor Tech War room   Rate 95   Rhythm Afib

## 2024-03-22 NOTE — BRIEF OP NOTE
Brief Operative Note:    : Yumi Bal MD     Referring Physician: Self,Aaareferral     All Operators: Surgeon(s):  Dakotah Beasley MD Sahai, Achal, MD     Preoperative Diagnosis: Chest pain [R07.9]     Postop Diagnosis: Chest pain [R07.9]    Treatments/Procedures: Procedure(s) (LRB):  ANGIOGRAM, CORONARY ARTERY (N/A)    Access: Right radial artery    Findings:Severe coronary artery disease is present.  Severe LM disease noted     See catheterization report for full details.    Intervention:     See catheterization report for full details.    Closure device:  VascBand        Plan:  - Post cath protocol   - IVF @ 250 cc/hr x 2 hours  - Bed rest x 1 hours   - Continue aspirin 81 mg daily   - Continue high intensity statin therapy (LDL goal < 70)  - Risk factor reduction (BP <130/80 mmHg, glycemic control, etc)  - CTS consult placed and will place pt in CCU   - Follow up with outpatient cardiologist    Estimated Blood loss: 20 cc    Specimens removed: No    Dakotah Beasley MD  Interventional Cardiology PGY-6

## 2024-03-22 NOTE — CONSULTS
[facility] - Emergency Dept  Interventional Cardiology  Consult Note    Patient Name: Gerald Wilkinson  MRN: [id number]  Admission Date: 3/22/2024  Hospital Length of Stay: 0 days  Code Status: No Order   Attending Provider: Gerald Crespo MD  Consulting Provider: Marina Mattson MD  Primary Care Physician: Phil Tellez MD  Principal Problem:<principal problem not specified>    Patient information was obtained from patient, past medical records, and ER records.     Inpatient consult to Interventional Cardiology  Consult performed by: Marina Mattson MD  Consult ordered by: Manuel Osorio MD        Subjective:     Chief Complaint:  positive stress test, chest pain     HPI:  Mr Wilkinson is a 74 yo M with PMH HTN that presents to the ED after a positive stress test. He has been having chest discomfort at rest, but not associated with activity (exercise/golf). He saw his PCP 3/11 and EKG showed NSR with RBBB and PVCs. The reports chest pain that's started months ago, lats for a few minutes, associated with actvitiy and relieves with rest. 2 nights ago while moving heavy items , he developed angina that resovled few minutes after resting. He alerted his PCP who ordered an exercise stress today. During the procedure he developed angina. Patient contacted by Dr Howard and advised to go to the ED for coronary angiogram. He is currently chest pain free. Denies any dyspnea, LH, LE edema. He only takes valsartan for his BP. He denies palpitations.      In the ED, his BP was elevated SBP 180s that improved to 150s. ECG shows RBBB, ST depressions in inferolateral leads and new onset atrial fib with rates 90s-120bpms. IC consulted for LHC    No past medical history on file.    No past surgical history on file.    Review of patient's allergies indicates:  No Known Allergies    (Not in a hospital admission)    Family History       Problem Relation (Age of Onset)    Cancer Mother, Sister    Hypertension Mother, Sister           Tobacco Use    Smoking status: Never    Smokeless tobacco: Never   Substance and Sexual Activity    Alcohol use: No    Drug use: Yes    Sexual activity: Yes     Partners: Female     Review of Systems   Constitutional: Negative for chills and fever.   HENT: Negative.     Cardiovascular:  Negative for chest pain.   Respiratory:  Negative for shortness of breath.    Endocrine: Negative.    Hematologic/Lymphatic: Negative.    Musculoskeletal: Negative.    All other systems reviewed and are negative.    Objective:     Vital Signs (Most Recent):    Vital Signs (24h Range):  BP: ()/()   Arterial Line BP: ()/()         There is no height or weight on file to calculate BMI.            No intake or output data in the 24 hours ending 03/22/24 1014    Lines/Drains/Airways       Peripheral Intravenous Line  Duration                  Peripheral IV - Single Lumen 10/02/20 1107 20 G Left Antecubital 1266 days                     Physical Exam  Constitutional:       General: He is not in acute distress.     Appearance: Normal appearance.   HENT:      Head: Normocephalic.      Mouth/Throat:      Mouth: Mucous membranes are moist.   Eyes:      Extraocular Movements: Extraocular movements intact.   Cardiovascular:      Rate and Rhythm: Tahcyardia rate and irregular rhythm.      Pulses: Normal pulses.      Heart sounds: No murmur heard.  Pulmonary:      Effort: Pulmonary effort is normal. No respiratory distress.      Breath sounds: Normal breath sounds.   Abdominal:      Palpations: Abdomen is soft.   Musculoskeletal:      Cervical back: Neck supple.      Right lower leg: No edema.      Left lower leg: No edema.   Skin:     General: Skin is warm.   Neurological:      Mental Status: He is alert and oriented to person, place, and time. Mental status is at baseline.   Psychiatric:         Mood and Affect: Mood normal.         Behavior: Behavior normal.         Thought Content: Thought content normal.            Significant Labs: BMP:  "No results for input(s): "GLU", "NA", "K", "CL", "CO2", "BUN", "CREATININE", "CALCIUM", "MG" in the last 48 hours., CMP No results for input(s): "NA", "K", "CL", "CO2", "GLU", "BUN", "CREATININE", "CALCIUM", "PROT", "ALBUMIN", "BILITOT", "ALKPHOS", "AST", "ALT", "ANIONGAP", "ESTGFRAFRICA", "EGFRNONAA" in the last 48 hours., CBC No results for input(s): "WBC", "HGB", "HCT", "PLT" in the last 48 hours., INR No results for input(s): "INR", "PROTIME" in the last 48 hours., Lipid Panel No results for input(s): "CHOL", "HDL", "LDLCALC", "TRIG", "CHOLHDL" in the last 48 hours., and Troponin No results for input(s): "TROPONINI" in the last 48 hours.    Significant Imaging:   Reviewed  Assessment and Plan:     Cardiac/Vascular  Positive cardiac stress test  Patient hemodynamically stable and plan for coronary angiogram today.   Stable for admission to hospital medicine as no needs for cardiac ICU at this time.  Consider cardiology consult when admitted for new onset atrial fibrillation, if patient doesn't convert to NSR.  Given CHADsVASc of 3, will need anticoagulation  Needs TTE.    --LHC +/- PCI, patient is a SHANE candidate  - Anti-platelet Therapy: ASA / clopidogrel  - Access: Right radial, R CFA back-up  - Catheters: Trever  - Creatinine/CrCl: pending   - Allergies: No shellfish / Iodine allergy  - Pre-Hydration: NS  - Pre-Op Med: Bendaryl 50mg pO   - All patient's questions were answered.  -The risks, benefits and alternatives of the procedure were explained to the patient.   -The risks of coronary angiography include but are not limited to: bleeding, infection, heart rhythm abnormalities, allergic reactions, kidney injury and potential need for dialysis, stroke and death.   - Should stenting be indicated, the patient has agreed to dual anti-platelet therapy for 1-consecutive year with a drug-eluting stent and a minimum of 1-month with the use of a bare metal stent  - Additionally, pt is aware that non-compliance is " likely to result in stent clotting with heart attack, heart failure, and/or death  -The risks of moderate sedation include hypotension, respiratory depression, arrhythmias, bronchospasm, and death.   - Informed consent was obtained and the patient is agreeable to proceed with the procedure.        VTE Risk Mitigation (From admission, onward)      None                Marina Mattson MD  Interventional Cardiology   [facility] - Emergency Dept

## 2024-03-22 NOTE — ASSESSMENT & PLAN NOTE
Patient hemodynamically stable and plan for coronary angiogram today.   Stable for admission to hospital medicine as no needs for cardiac ICU at this time.  Consider cardiology consult when admitted for new onset atrial fibrillation, if patient doesn't convert to NSR.  Given CHADsVASc of 3, will need anticoagulation  Needs TTE.    --LHC +/- PCI, patient is a SHANE candidate  - Anti-platelet Therapy: ASA / clopidogrel  - Access: Right radial, R CFA back-up  - Catheters: Trever  - Creatinine/CrCl: pending   - Allergies: No shellfish / Iodine allergy  - Pre-Hydration: NS  - Pre-Op Med: Bendaryl 50mg pO   - All patient's questions were answered.  -The risks, benefits and alternatives of the procedure were explained to the patient.   -The risks of coronary angiography include but are not limited to: bleeding, infection, heart rhythm abnormalities, allergic reactions, kidney injury and potential need for dialysis, stroke and death.   - Should stenting be indicated, the patient has agreed to dual anti-platelet therapy for 1-consecutive year with a drug-eluting stent and a minimum of 1-month with the use of a bare metal stent  - Additionally, pt is aware that non-compliance is likely to result in stent clotting with heart attack, heart failure, and/or death  -The risks of moderate sedation include hypotension, respiratory depression, arrhythmias, bronchospasm, and death.   - Informed consent was obtained and the patient is agreeable to proceed with the procedure.

## 2024-03-22 NOTE — ED NOTES
Cardiology at bedside consenting for stent placement. Per cardiology patient going for stent placement today and should remain NPO.

## 2024-03-22 NOTE — H&P
[facility] - Emergency Dept  [facility] Medicine  History & Physical    Patient Name: Geradl Wilkinson  MRN: [id number]  Patient Class: IP- Inpatient  Admission Date: 3/22/2024  Attending Physician: Will Albert DO   Primary Care Provider: Phil Tellez MD         Patient information was obtained from patient and ER records.     Subjective:     Principal Problem:<principal problem not specified>    Chief Complaint:   Chief Complaint   Patient presents with    Chest Pain     Pt arrives from [facility] where he was undergoing a stress test. He developed chest pain and received 2 sprays of NTG prior to EMS arrival.         HPI: This is a 73-year-old gentleman with a history of HTN who presents to the ED after positive stress test.  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  About 10 days ago he says he presented to PCP where EKG was done and was unremarkable.  Opted to watch and wait.  About 2 days ago while he was lifting equipment at work he started experiencing chest tightness, across his chest, more severe than usual.  After resolved, he experienced tightness once again.  Describes as 5/10 in severity, nonradiating.  Previously located around his right armpit, now near the center of his chest.  He then message to his PCP, stress test was performed the next day on 03/22.  Reported chest discomfort during stress test with EKG changes, was referred to the ED with planned LHC from Interventional Cardiology.    ED course: Troponins negative on arrival with no active chest pain. Loaded w ASA/plavis. Found to be in new onset atrial fibrillation w RVR, status post IV metoprolol push followed by po lopressor 50mg with improvement    History reviewed. No pertinent past medical history.    History reviewed. No pertinent surgical history.    Review of patient's allergies indicates:  No  Known Allergies    No current facility-administered medications on file prior to encounter.     Current Outpatient Medications on File Prior to Encounter   Medication Sig    multivitamin capsule Take 1 capsule by mouth once daily.    valsartan (DIOVAN) 160 MG tablet Take 1 tablet (160 mg total) by mouth once daily.     Family History       Problem Relation (Age of Onset)    Cancer Mother, Sister    Hypertension Mother, Sister          Tobacco Use    Smoking status: Never    Smokeless tobacco: Never   Substance and Sexual Activity    Alcohol use: No    Drug use: Yes    Sexual activity: Yes     Partners: Female     Review of Systems   Constitutional:  Negative for activity change and appetite change.   HENT:  Negative for congestion and dental problem.    Eyes:  Negative for discharge and itching.   Respiratory:  Positive for chest tightness. Negative for apnea, shortness of breath and wheezing.    Cardiovascular:  Positive for chest pain. Negative for leg swelling.   Gastrointestinal:  Negative for abdominal distention and abdominal pain.   Endocrine: Negative for cold intolerance and heat intolerance.   Genitourinary:  Negative for difficulty urinating and dysuria.   Musculoskeletal:  Negative for arthralgias and back pain.   Skin:  Negative for color change and pallor.   Allergic/Immunologic: Negative for environmental allergies and food allergies.   Neurological:  Negative for dizziness and facial asymmetry.   Hematological:  Negative for adenopathy. Does not bruise/bleed easily.   Psychiatric/Behavioral:  Negative for agitation and behavioral problems.      Objective:     Vital Signs (Most Recent):  Temp: 98.3 °F (36.8 °C) (03/22/24 1030)  Pulse: 89 (03/22/24 1232)  Resp: 16 (03/22/24 1232)  BP: (!) 150/85 (03/22/24 1232)  SpO2: 95 % (03/22/24 1232) Vital Signs (24h Range):  Temp:  [98.1 °F (36.7 °C)-98.3 °F (36.8 °C)] 98.3 °F (36.8 °C)  Pulse:  [] 89  Resp:  [15-20] 16  SpO2:  [95 %-99 %] 95 %  BP:  (130-180)/(75-98) 150/85        There is no height or weight on file to calculate BMI.     Physical Exam  Constitutional:       Appearance: Normal appearance.   HENT:      Head: Normocephalic and atraumatic.      Right Ear: Tympanic membrane normal.      Left Ear: Tympanic membrane normal.   Eyes:      Extraocular Movements: Extraocular movements intact.      Conjunctiva/sclera: Conjunctivae normal.      Pupils: Pupils are equal, round, and reactive to light.   Cardiovascular:      Rate and Rhythm: Tachycardia present. Rhythm irregular.      Pulses: Normal pulses.      Heart sounds: Normal heart sounds.   Pulmonary:      Effort: Pulmonary effort is normal. No respiratory distress.      Breath sounds: Normal breath sounds. No wheezing.   Abdominal:      General: Abdomen is flat. Bowel sounds are normal. There is no distension.      Tenderness: There is no abdominal tenderness.   Musculoskeletal:         General: No swelling or tenderness. Normal range of motion.      Cervical back: Normal range of motion.      Right lower leg: No edema.      Left lower leg: No edema.   Skin:     General: Skin is warm and dry.      Capillary Refill: Capillary refill takes less than 2 seconds.   Neurological:      General: No focal deficit present.      Mental Status: He is alert and oriented to person, place, and time.      Cranial Nerves: No cranial nerve deficit.   Psychiatric:         Mood and Affect: Mood normal.         Behavior: Behavior normal.         Thought Content: Thought content normal.              CRANIAL NERVES     CN III, IV, VI   Pupils are equal, round, and reactive to light.       Significant Labs: All pertinent labs within the past 24 hours have been reviewed.    Significant Imaging: I have reviewed all pertinent imaging results/findings within the past 24 hours.  Assessment/Plan:     Paroxysmal atrial fibrillation with rapid ventricular response  Patient with Paroxysmal (<7 days) atrial fibrillation which is  controlled currently with Beta Blocker. Patient is currently in sinus rhythm.[id number] Score: 2. HASBLED Score: . Anticoagulation not indicated due to Low [id number] .    -New onset  -EKG with atrial fibrillation RVR  -s/p IV metoprolol push followed by po lopressor 50mg with improvement  Plan:  -IV metoprolol prn ordered for now  -Monitor on tele  -[facility] as per above    Benign essential hypertension  Chronic, controlled. Latest blood pressure and vitals reviewed-     Temp:  [98.1 °F (36.7 °C)-98.3 °F (36.8 °C)]   Pulse:  []   Resp:  [15-20]   BP: (130-180)/(75-98)   SpO2:  [95 %-99 %] .   Home meds for hypertension were reviewed and noted below.   Hypertension Medications               valsartan (DIOVAN) 160 MG tablet Take 1 tablet (160 mg total) by mouth once daily.            While in the hospital, will manage blood pressure as follows; Continue home antihypertensive regimen    Will utilize p.r.n. blood pressure medication only if patient's blood pressure greater than 180/110 and he develops symptoms such as worsening chest pain or shortness of breath.      VTE Risk Mitigation (From admission, onward)           Ordered     IP VTE HIGH RISK PATIENT  Once         03/22/24 1312     Place sequential compression device  Until discontinued         03/22/24 1312                                    Will Albert DO  Department of Hospital Medicine  [facility] - Emergency Dept

## 2024-03-22 NOTE — ED TRIAGE NOTES
74 y/o M presents to ER with c/c c/p. Pt started experiencing intermittent c/p when exerting about a week and half ago, today was having a stress test and experienced c/p. Given two sprays of NG en route, arrives without c/p. Denies SOB, N/V/D, fevers, chills, and h/x A-fib,

## 2024-03-23 PROBLEM — I24.0: Status: ACTIVE | Noted: 2024-03-23

## 2024-03-23 LAB
ALBUMIN SERPL BCP-MCNC: 3.6 G/DL (ref 3.5–5.2)
ALP SERPL-CCNC: 40 U/L (ref 55–135)
ALT SERPL W/O P-5'-P-CCNC: 20 U/L (ref 10–44)
ANION GAP SERPL CALC-SCNC: 10 MMOL/L (ref 8–16)
APTT PPP: 42.8 SEC (ref 21–32)
APTT PPP: 47.6 SEC (ref 21–32)
AST SERPL-CCNC: 22 U/L (ref 10–40)
BASOPHILS # BLD AUTO: 0.05 K/UL (ref 0–0.2)
BASOPHILS NFR BLD: 0.6 % (ref 0–1.9)
BILIRUB SERPL-MCNC: 0.7 MG/DL (ref 0.1–1)
BUN SERPL-MCNC: 18 MG/DL (ref 8–23)
CALCIUM SERPL-MCNC: 9.1 MG/DL (ref 8.7–10.5)
CHLORIDE SERPL-SCNC: 111 MMOL/L (ref 95–110)
CO2 SERPL-SCNC: 20 MMOL/L (ref 23–29)
CREAT SERPL-MCNC: 0.8 MG/DL (ref 0.5–1.4)
DIFFERENTIAL METHOD BLD: NORMAL
EOSINOPHIL # BLD AUTO: 0.3 K/UL (ref 0–0.5)
EOSINOPHIL NFR BLD: 3.4 % (ref 0–8)
ERYTHROCYTE [DISTWIDTH] IN BLOOD BY AUTOMATED COUNT: 12.5 % (ref 11.5–14.5)
EST. GFR  (NO RACE VARIABLE): >60 ML/MIN/1.73 M^2
GLUCOSE SERPL-MCNC: 91 MG/DL (ref 70–110)
HCT VFR BLD AUTO: 47.8 % (ref 40–54)
HGB BLD-MCNC: 15.5 G/DL (ref 14–18)
IMM GRANULOCYTES # BLD AUTO: 0.03 K/UL (ref 0–0.04)
IMM GRANULOCYTES NFR BLD AUTO: 0.4 % (ref 0–0.5)
LYMPHOCYTES # BLD AUTO: 1.8 K/UL (ref 1–4.8)
LYMPHOCYTES NFR BLD: 21 % (ref 18–48)
MAGNESIUM SERPL-MCNC: 2.1 MG/DL (ref 1.6–2.6)
MCH RBC QN AUTO: 29.5 PG (ref 27–31)
MCHC RBC AUTO-ENTMCNC: 32.4 G/DL (ref 32–36)
MCV RBC AUTO: 91 FL (ref 82–98)
MONOCYTES # BLD AUTO: 0.9 K/UL (ref 0.3–1)
MONOCYTES NFR BLD: 10.2 % (ref 4–15)
NEUTROPHILS # BLD AUTO: 5.5 K/UL (ref 1.8–7.7)
NEUTROPHILS NFR BLD: 64.4 % (ref 38–73)
NRBC BLD-RTO: 0 /100 WBC
OHS QRS DURATION: 122 MS
OHS QRS DURATION: 124 MS
OHS QTC CALCULATION: 431 MS
OHS QTC CALCULATION: 432 MS
PHOSPHATE SERPL-MCNC: 2.7 MG/DL (ref 2.7–4.5)
PLATELET # BLD AUTO: 199 K/UL (ref 150–450)
PMV BLD AUTO: 10.6 FL (ref 9.2–12.9)
POTASSIUM SERPL-SCNC: 4.2 MMOL/L (ref 3.5–5.1)
PROT SERPL-MCNC: 6.7 G/DL (ref 6–8.4)
RBC # BLD AUTO: 5.26 M/UL (ref 4.6–6.2)
SODIUM SERPL-SCNC: 141 MMOL/L (ref 136–145)
WBC # BLD AUTO: 8.49 K/UL (ref 3.9–12.7)

## 2024-03-23 PROCEDURE — 83735 ASSAY OF MAGNESIUM: CPT | Performed by: STUDENT IN AN ORGANIZED HEALTH CARE EDUCATION/TRAINING PROGRAM

## 2024-03-23 PROCEDURE — 93010 ELECTROCARDIOGRAM REPORT: CPT | Mod: ,,, | Performed by: INTERNAL MEDICINE

## 2024-03-23 PROCEDURE — 25000003 PHARM REV CODE 250: Performed by: STUDENT IN AN ORGANIZED HEALTH CARE EDUCATION/TRAINING PROGRAM

## 2024-03-23 PROCEDURE — 85025 COMPLETE CBC W/AUTO DIFF WBC: CPT | Performed by: STUDENT IN AN ORGANIZED HEALTH CARE EDUCATION/TRAINING PROGRAM

## 2024-03-23 PROCEDURE — 94761 N-INVAS EAR/PLS OXIMETRY MLT: CPT

## 2024-03-23 PROCEDURE — 85730 THROMBOPLASTIN TIME PARTIAL: CPT | Performed by: INTERNAL MEDICINE

## 2024-03-23 PROCEDURE — 99900035 HC TECH TIME PER 15 MIN (STAT)

## 2024-03-23 PROCEDURE — 84100 ASSAY OF PHOSPHORUS: CPT | Performed by: STUDENT IN AN ORGANIZED HEALTH CARE EDUCATION/TRAINING PROGRAM

## 2024-03-23 PROCEDURE — 93005 ELECTROCARDIOGRAM TRACING: CPT

## 2024-03-23 PROCEDURE — 63600175 PHARM REV CODE 636 W HCPCS: Performed by: STUDENT IN AN ORGANIZED HEALTH CARE EDUCATION/TRAINING PROGRAM

## 2024-03-23 PROCEDURE — 20000000 HC ICU ROOM

## 2024-03-23 PROCEDURE — 80053 COMPREHEN METABOLIC PANEL: CPT | Performed by: STUDENT IN AN ORGANIZED HEALTH CARE EDUCATION/TRAINING PROGRAM

## 2024-03-23 RX ORDER — METOPROLOL TARTRATE 25 MG/1
25 TABLET, FILM COATED ORAL 2 TIMES DAILY
Status: DISCONTINUED | OUTPATIENT
Start: 2024-03-23 | End: 2024-03-27

## 2024-03-23 RX ORDER — METOPROLOL TARTRATE 25 MG/1
25 TABLET, FILM COATED ORAL ONCE
Status: COMPLETED | OUTPATIENT
Start: 2024-03-23 | End: 2024-03-23

## 2024-03-23 RX ORDER — HEPARIN SODIUM,PORCINE/D5W 25000/250
0-40 INTRAVENOUS SOLUTION INTRAVENOUS CONTINUOUS
Status: DISCONTINUED | OUTPATIENT
Start: 2024-03-23 | End: 2024-03-27

## 2024-03-23 RX ORDER — METOPROLOL TARTRATE 1 MG/ML
5 INJECTION, SOLUTION INTRAVENOUS ONCE
Status: COMPLETED | OUTPATIENT
Start: 2024-03-23 | End: 2024-03-23

## 2024-03-23 RX ADMIN — METOPROLOL TARTRATE 25 MG: 25 TABLET, FILM COATED ORAL at 08:03

## 2024-03-23 RX ADMIN — ATORVASTATIN CALCIUM 80 MG: 40 TABLET, FILM COATED ORAL at 08:03

## 2024-03-23 RX ADMIN — METOROPROLOL TARTRATE 5 MG: 5 INJECTION, SOLUTION INTRAVENOUS at 07:03

## 2024-03-23 RX ADMIN — HYDRALAZINE HYDROCHLORIDE 25 MG: 25 TABLET, FILM COATED ORAL at 02:03

## 2024-03-23 RX ADMIN — THERA TABS 1 TABLET: TAB at 08:03

## 2024-03-23 RX ADMIN — VALSARTAN 160 MG: 40 TABLET, FILM COATED ORAL at 08:03

## 2024-03-23 RX ADMIN — HEPARIN SODIUM AND DEXTROSE 12 UNITS/KG/HR: 10000; 5 INJECTION INTRAVENOUS at 08:03

## 2024-03-23 RX ADMIN — POLYETHYLENE GLYCOL 3350 17 G: 17 POWDER, FOR SOLUTION ORAL at 08:03

## 2024-03-23 RX ADMIN — ASPIRIN 81 MG: 81 TABLET, COATED ORAL at 08:03

## 2024-03-23 NOTE — PROGRESS NOTES
[facility] - Cardiac Intensive Care  Cardiology  Progress Note    Patient Name: Gerald Wilkinson  MRN: [id number]  Admission Date: 3/22/2024  Hospital Length of Stay: 1 days  Code Status: Full Code   Attending Physician: Arturo Salmon MD   Primary Care Physician: Phil Tellez MD  Expected Discharge Date:   Principal Problem:Positive cardiac stress test    Subjective:     Hospital Course:   Cath on admission with severe LM disease prompting CTS evaluation. New onset A fib treated with metoprolol and AC started for [id number] 4.     Interval History: NAEON, asymptomatic Afib continues.     Review of Systems   Constitutional: Negative for chills, diaphoresis and night sweats.   Cardiovascular:  Positive for irregular heartbeat. Negative for chest pain, dyspnea on exertion, leg swelling, near-syncope and palpitations.   Respiratory:  Negative for cough, shortness of breath and wheezing.    Skin:  Negative for color change and dry skin.   Musculoskeletal:  Negative for joint pain and joint swelling.   Gastrointestinal:  Negative for abdominal pain, diarrhea, nausea and vomiting.   Genitourinary:  Negative for dysuria.   Neurological:  Negative for dizziness, headaches, light-headedness and weakness.   All other systems reviewed and are negative.    Objective:     Vital Signs (Most Recent):  Temp: 98.1 °F (36.7 °C) (03/23/24 0301)  Pulse: 105 (03/23/24 0940)  Resp: 20 (03/23/24 0940)  BP: (!) 156/73 (03/23/24 0601)  SpO2: 98 % (03/23/24 0940) Vital Signs (24h Range):  Temp:  [98 °F (36.7 °C)-98.3 °F (36.8 °C)] 98.1 °F (36.7 °C)  Pulse:  [] 105  Resp:  [13-28] 20  SpO2:  [95 %-99 %] 98 %  BP: (113-178)/(57-92) 156/73     Weight: 80.5 kg (177 lb 8.2 oz)  Body mass index is 26.21 kg/m².     SpO2: 98 %         Intake/Output Summary (Last 24 hours) at 3/23/2024 1217  Last data filed at 3/23/2024 0301  Gross per 24 hour   Intake 240 ml   Output 1300 ml   Net -1060 ml       Lines/Drains/Airways       Peripheral  Intravenous Line  Duration                  Peripheral IV - Single Lumen 10/02/20 1107 20 G Left Antecubital 1268 days                       Physical Exam  Vitals and nursing note reviewed.   Constitutional:       Appearance: Normal appearance.   Eyes:      Extraocular Movements: Extraocular movements intact.      Conjunctiva/sclera: Conjunctivae normal.   Cardiovascular:      Rate and Rhythm: Tachycardia present. Rhythm irregular.      Comments: No JVD  Pulmonary:      Effort: Pulmonary effort is normal.      Breath sounds: Normal breath sounds. No rales.   Abdominal:      General: Bowel sounds are normal.      Palpations: Abdomen is soft.   Musculoskeletal:      Cervical back: Normal range of motion.      Right lower leg: No edema.      Left lower leg: No edema.   Skin:     General: Skin is warm and dry.   Neurological:      General: No focal deficit present.      Mental Status: He is alert and oriented to person, place, and time.            Significant Labs: All pertinent lab results from the last 24 hours have been reviewed.    Significant Imaging:  reviewed    Assessment and Plan:       * Left main coronary artery thrombosis  Interventional cardiology consulted with cath demonstrating LM disease. S/p ASA & Plavix load. Lipid panel 3 mo. prior WNL.    - Planned surgical intervention 03/28/24 with CABG x2 (LIMA-LAD, SVG-OM) and MAZE to address his new afib. Tentative surgery date 3/28/24  - CTS ordered and f/u pre-op studies (chest CT, carotid ultrasound, TTE)   - Continue Heparin gtt  - Hold Plavix. Continue ASA 81  - Holding ARB  - Atorvastatin 80 mg daily  - General cardiology follow-up on DC    Paroxysmal atrial fibrillation with rapid ventricular response  New onset; CHADSVASC 4 (age, HTN, CAD).     - Start Lopressor 25 mg BID and IV metoprolol; goal HR < 120 BPM  - Heparin gtt for anticoagulation can convert to DOAC afterwards  - f/u TTE  - Monitor on telemetry  - K>4, Mag>2  - EP referral on  DC      Positive cardiac stress test  See LM    Benign essential hypertension  Continue Valsartan  BP goal < 130/80 mmHg        VTE Risk Mitigation (From admission, onward)           Ordered     heparin 25,000 units in dextrose 5% (100 units/ml) IV bolus from bag LOW INTENSITY nomogram - [facility]  As needed (PRN)        Question:  Heparin Infusion Adjustment (DO NOT MODIFY ANSWER)  Answer:  [email or web address]\epic\Images\Pharmacy\HeparinInfusions\heparin LOW INTENSITY nomogram for [facility] [id number]    03/23/24 0818     heparin 25,000 units in dextrose 5% (100 units/ml) IV bolus from bag LOW INTENSITY nomogram - [facility]  As needed (PRN)        Question:  Heparin Infusion Adjustment (DO NOT MODIFY ANSWER)  Answer:  [email or web address]\epic\Images\Pharmacy\HeparinInfusions\heparin LOW INTENSITY nomogram for [facility] [id number].pdf    03/23/24 0818     heparin 25,000 units in dextrose 5% 250 mL (100 units/mL) infusion LOW INTENSITY nomogram - [facility]  Continuous        Question:  Begin at (units/kg/hr)  Answer:  12    03/23/24 0818     IP VTE HIGH RISK PATIENT  Once         03/22/24 1312     Place sequential compression device  Until discontinued         03/22/24 1312                    Pascual Das MD  Cardiology  [facility] - Cardiac Intensive Care

## 2024-03-23 NOTE — NURSING
Nurses Note -- 4 Eyes      3/22/2024   6: 15 PM      Skin assessed during: Admit      [x] No Altered Skin Integrity Present    []Prevention Measures Documented      [] Yes- Altered Skin Integrity Present or Discovered   [] LDA Added if Not in Epic (Describe Wound)   [] New Altered Skin Integrity was Present on Admit and Documented in LDA   [] Wound Image Taken    Wound Care Consulted? No    Attending Nurse:  Aaron WIN RN     Second RN/Staff Member: BRODIE Clark

## 2024-03-23 NOTE — HOSPITAL COURSE
Cath on admission with severe LM disease prompting CTS evaluation. New onset A fib treated with metoprolol and AC started for [id number] 4. CTS with plans for CABG 3/27 LIMA-LAD, SVG-OM. TTE EF 60-65% and normal diastolic dysfunction.

## 2024-03-23 NOTE — SUBJECTIVE & OBJECTIVE
Interval History: NAEON, asymptomatic Afib continues.     Review of Systems   Constitutional: Negative for chills, diaphoresis and night sweats.   Cardiovascular:  Positive for irregular heartbeat. Negative for chest pain, dyspnea on exertion, leg swelling, near-syncope and palpitations.   Respiratory:  Negative for cough, shortness of breath and wheezing.    Skin:  Negative for color change and dry skin.   Musculoskeletal:  Negative for joint pain and joint swelling.   Gastrointestinal:  Negative for abdominal pain, diarrhea, nausea and vomiting.   Genitourinary:  Negative for dysuria.   Neurological:  Negative for dizziness, headaches, light-headedness and weakness.   All other systems reviewed and are negative.    Objective:     Vital Signs (Most Recent):  Temp: 98.1 °F (36.7 °C) (03/23/24 0301)  Pulse: 105 (03/23/24 0940)  Resp: 20 (03/23/24 0940)  BP: (!) 156/73 (03/23/24 0601)  SpO2: 98 % (03/23/24 0940) Vital Signs (24h Range):  Temp:  [98 °F (36.7 °C)-98.3 °F (36.8 °C)] 98.1 °F (36.7 °C)  Pulse:  [] 105  Resp:  [13-28] 20  SpO2:  [95 %-99 %] 98 %  BP: (113-178)/(57-92) 156/73     Weight: 80.5 kg (177 lb 8.2 oz)  Body mass index is 26.21 kg/m².     SpO2: 98 %         Intake/Output Summary (Last 24 hours) at 3/23/2024 1217  Last data filed at 3/23/2024 0301  Gross per 24 hour   Intake 240 ml   Output 1300 ml   Net -1060 ml       Lines/Drains/Airways       Peripheral Intravenous Line  Duration                  Peripheral IV - Single Lumen 10/02/20 1107 20 G Left Antecubital 1268 days                       Physical Exam  Vitals and nursing note reviewed.   Constitutional:       Appearance: Normal appearance.   Eyes:      Extraocular Movements: Extraocular movements intact.      Conjunctiva/sclera: Conjunctivae normal.   Cardiovascular:      Rate and Rhythm: Tachycardia present. Rhythm irregular.      Comments: No JVD  Pulmonary:      Effort: Pulmonary effort is normal.      Breath sounds: Normal breath  sounds. No rales.   Abdominal:      General: Bowel sounds are normal.      Palpations: Abdomen is soft.   Musculoskeletal:      Cervical back: Normal range of motion.      Right lower leg: No edema.      Left lower leg: No edema.   Skin:     General: Skin is warm and dry.   Neurological:      General: No focal deficit present.      Mental Status: He is alert and oriented to person, place, and time.            Significant Labs: All pertinent lab results from the last 24 hours have been reviewed.    Significant Imaging:  reviewed

## 2024-03-23 NOTE — H&P
[facility] - Cardiac Intensive Care  Cardiology  History and Physical     Patient Name: Gerald Wilkinson  MRN: [id number]  Admission Date: 3/22/2024  Code Status: Full Code   Attending Provider: Arturo Salmon MD   Primary Care Physician: Phil Tellez MD  Principal Problem:Positive cardiac stress test    Patient information was obtained from patient, past medical records, and ER records.     Subjective:     Chief Complaint:  Chest pain     HPI:  Mr Wilkinson is a 72 yo M with PMH HTN that is presenting to the ED this morning after a positive exercise stress test. He had been having some chest discomfort at rest and saw his PCP 3/11. Baseline EKG with RBBB, but developed diffuse ST depressions with DELICIA in AVR. Contacted by Dr Howard and advised to go to the ED for coronary angiogram today.     In the ED, -130s, but vitally stable. EKG with Afib with RVR with RBBB. Cardiology consulted for positive stress test and Afib.    History reviewed. No pertinent past medical history.    History reviewed. No pertinent surgical history.    Review of patient's allergies indicates:  No Known Allergies    No current facility-administered medications on file prior to encounter.     Current Outpatient Medications on File Prior to Encounter   Medication Sig    multivitamin capsule Take 1 capsule by mouth once daily.    valsartan (DIOVAN) 160 MG tablet Take 1 tablet (160 mg total) by mouth once daily.     Family History       Problem Relation (Age of Onset)    Cancer Mother, Sister    Hypertension Mother, Sister          Tobacco Use    Smoking status: Never    Smokeless tobacco: Never   Substance and Sexual Activity    Alcohol use: No    Drug use: Yes    Sexual activity: Yes     Partners: Female     Review of Systems   Cardiovascular:  Positive for chest pain.   All other systems reviewed and are negative.    Objective:     Vital Signs (Most Recent):  Temp: 98.3 °F (36.8 °C) (03/22/24 1030)  Pulse: (!) 130 (03/22/24  1030)  Resp: 19 (03/22/24 1030)  BP: 138/75 (03/22/24 1030)  SpO2: 96 % (03/22/24 1030) Vital Signs (24h Range):  Temp:  [98.1 °F (36.7 °C)-98.3 °F (36.8 °C)] 98.3 °F (36.8 °C)  Pulse:  [113-130] 130  Resp:  [15-19] 19  SpO2:  [96 %-99 %] 96 %  BP: (130-138)/(75-88) 138/75        There is no height or weight on file to calculate BMI.    SpO2: 96 %       No intake or output data in the 24 hours ending 03/22/24 1044    Lines/Drains/Airways       Peripheral Intravenous Line  Duration                  Peripheral IV - Single Lumen 10/02/20 1107 20 G Left Antecubital 1266 days                     Physical Exam  Constitutional:       Appearance: Normal appearance. He is not ill-appearing.   HENT:      Head: Normocephalic.      Mouth/Throat:      Mouth: Mucous membranes are moist.   Eyes:      Extraocular Movements: Extraocular movements intact.   Cardiovascular:      Rate and Rhythm: Tachycardia present. Rhythm irregular.      Pulses: Normal pulses.   Pulmonary:      Effort: Pulmonary effort is normal.   Abdominal:      Palpations: Abdomen is soft.   Musculoskeletal:      Cervical back: Neck supple.      Right lower leg: No edema.      Left lower leg: No edema.   Skin:     General: Skin is warm.   Neurological:      Mental Status: He is alert and oriented to person, place, and time.          Significant Labs: BMP:   Recent Labs   Lab 03/22/24  1109 03/23/24  0230   GLU 93 91    141   K 4.0 4.2    111*   CO2 22* 20*   BUN 17 18   CREATININE 0.9 0.8   CALCIUM 9.3 9.1   MG 2.1 2.1   , CMP   Recent Labs   Lab 03/22/24  1109 03/23/24  0230    141   K 4.0 4.2    111*   CO2 22* 20*   GLU 93 91   BUN 17 18   CREATININE 0.9 0.8   CALCIUM 9.3 9.1   PROT 7.1 6.7   ALBUMIN 4.0 3.6   BILITOT 0.8 0.7   ALKPHOS 39* 40*   AST 22 22   ALT 22 20   ANIONGAP 8 10   , CBC   Recent Labs   Lab 03/22/24  1109 03/23/24  0230   WBC 7.59 8.49   HGB 15.8 15.5   HCT 48.6 47.8    199   , INR No results for input(s):  ""INR", "PROTIME" in the last 48 hours., Lipid Panel No results for input(s): "CHOL", "HDL", "LDLCALC", "TRIG", "CHOLHDL" in the last 48 hours., and Troponin   Recent Labs   Lab 03/22/24  1109 03/22/24  1336   TROPONINI <0.006 0.022       Significant Imaging:   Reviewed  Assessment and Plan:     * Positive cardiac stress test  Interventional cardiology consulted with cath demonstrating LM disease. S/p ASA & Plavix load. Lipid panel 3 mo. prior WNL.    - continue Heparin gtt  - Atorvastatin 80 mg daily  - beta blocker and ARB   - Cardiac rehab and general cardiology follow-up on DC    Paroxysmal atrial fibrillation with rapid ventricular response  New onset; CHADSVASC 4 (age, HTN, CAD).     - Start Lopressor 25 mg BID and IV metoprolol; goal HR < 120 BPM  - Heparin gtt for anticoagulation can convert to DOAC afterwards  - f/u TTE  - Monitor on telemetry  - K>4, Mag>2  - EP referral on DC      Benign essential hypertension  Continue Valsartan  BP goal < 130/80 mmHg        VTE Risk Mitigation (From admission, onward)           Ordered     heparin 25,000 units in dextrose 5% (100 units/ml) IV bolus from bag LOW INTENSITY nomogram - [facility]  As needed (PRN)        Question:  Heparin Infusion Adjustment (DO NOT MODIFY ANSWER)  Answer:  [email or web address]\Images\Pharmacy\HeparinInfusions\heparin LOW INTENSITY nomogram for [facility] [id number]    03/23/24 0818     heparin 25,000 units in dextrose 5% (100 units/ml) IV bolus from bag LOW INTENSITY nomogram - [facility]  As needed (PRN)        Question:  Heparin Infusion Adjustment (DO NOT MODIFY ANSWER)  Answer:  [email or web address]\[email or web address]\Images\Pharmacy\HeparinInfusions\heparin LOW INTENSITY nomogram for [facility] [id number]    03/23/24 0818     heparin 25,000 units in dextrose 5% 250 mL (100 units/mL) infusion LOW INTENSITY nomogram - [facility]  Continuous        Question:  Begin at (units/kg/hr)  Answer:  12    03/23/24 0818     IP VTE HIGH RISK PATIENT  Once         03/22/24 1312     Place sequential " compression device  Until discontinued         03/22/24 3122                    Pascual Das MD  Cardiology   [facility] - Cardiac Intensive Care

## 2024-03-23 NOTE — PLAN OF CARE
"Cardiac ICU Care Plan    POC reviewed with Gerald G Minh and family. Questions and concerns addressed. No acute events today. Pt progressing toward goals. Will continue to monitor. See below and flowsheets for full assessment and VS info.     -consult CTS  -pt slightly hypertensive with systolics in 160s, MD Valadez  notified. PRN Hydralazine given     Neuro:  [place] Coma Scale  Best Eye Response: 4-->(E4) spontaneous  Best Motor Response: 6-->(M6) obeys commands  Best Verbal Response: 5-->(V5) oriented  [place] Coma Scale Score: 15  Assessment Qualifiers: patient not sedated/intubated  Pupil PERRLA: yes    24 hr Temp:  [98 °F (36.7 °C)-98.3 °F (36.8 °C)]      CV:  Rhythm: atrial rhythm   DVT prophylaxis: VTE Required Core Measure: Pharmacological prophylaxis initiated/maintained                            Pulses  Right Radial Pulse: 2+ (normal)  Left Radial Pulse: 2+ (normal)  Right Dorsalis Pedis Pulse: 1+ (weak)  Left Dorsalis Pedis Pulse: 1+ (weak)  Right Posterior Tibial Pulse: 1+ (weak)  Left Posterior Tibial Pulse: 1+ (weak)    Resp:  Flow (L/min): 2       GI/:  GI prophylaxis: no  Diet/Nutrition Received: low saturated fat/low cholesterol, 2 gram sodium  Last Bowel Movement: 03/22/24  Voiding Characteristics: voids spontaneously without difficulty   Intake/Output Summary (Last 24 hours) at 3/23/2024 0533  Last data filed at 3/23/2024 0301  Gross per 24 hour   Intake 240 ml   Output 1300 ml   Net -1060 ml          Labs/Accuchecks:  Recent Labs   Lab 03/22/24  1105 03/22/24  1109 03/23/24  0230   WBC  --  7.59 8.49   RBC  --  5.32 5.26   HGB  --  15.8 15.5   HCT 48 48.6 47.8   PLT  --  187 199    No results for input(s): "PT", "INR", "APTT" in the last 168 hours.   Recent Labs     03/23/24  0230      K 4.2   CO2 20*   *   BUN 18   CREATININE 0.8   ALKPHOS 40*   ALT 20   AST 22   BILITOT 0.7       Recent Labs   Lab 03/22/24  1109 03/22/24  1336   TROPONINI <0.006 0.022    No results for " "input(s): "PH", "PCO2", "PO2", "HCO3", "POCSATURATED", "BE" in the last 72 hours.    Electrolytes: N/A - electrolytes WDL  Accuchecks: none    Gtts/LDAs:      Lines/Drains/Airways       Peripheral Intravenous Line  Duration                  Peripheral IV - Single Lumen 10/02/20 1107 20 G Left Antecubital 1267 days                    Skin/Wounds  Bathing/Skin Care: (S) back care;bath, complete;dressed/undressed;electrode patches/site rotation;linen changed (03/22/24 1815)  Wounds: No  Wound care consulted: No    Problem: Adult Inpatient Plan of Care  Goal: Plan of Care Review  3/23/2024 0533 by Michaela Love RN  Outcome: Ongoing, Progressing  3/23/2024 0532 by Michaela Love RN  Outcome: Ongoing, Progressing  Goal: Patient-Specific Goal (Individualized)  Outcome: Ongoing, Progressing  Goal: Absence of Hospital-Acquired Illness or Injury  Outcome: Ongoing, Progressing  Goal: Optimal Comfort and Wellbeing  Outcome: Ongoing, Progressing  Goal: Readiness for Transition of Care  Outcome: Ongoing, Progressing     Problem: Infection  Goal: Absence of Infection Signs and Symptoms  Outcome: Ongoing, Progressing     Problem: Fall Injury Risk  Goal: Absence of Fall and Fall-Related Injury  Outcome: Ongoing, Progressing     "

## 2024-03-23 NOTE — ASSESSMENT & PLAN NOTE
Interventional cardiology consulted with cath demonstrating LM disease. S/p ASA & Plavix load. Lipid panel 3 mo. prior WNL.    - Planned surgical intervention 03/28/24 with CABG x2 (LIMA-LAD, SVG-OM) and MAZE to address his new afib. Tentative surgery date 3/28/24  - CTS ordered and f/u pre-op studies (chest CT, carotid ultrasound, TTE)   - Continue Heparin gtt  - Hold Plavix. Continue ASA 81  - Holding ARB  - Atorvastatin 80 mg daily  - General cardiology follow-up on DC

## 2024-03-23 NOTE — PLAN OF CARE
"Cardiac ICU Care Plan    POC reviewed with Gerald DONNA Wilkinson and family. Questions and concerns addressed. Pt progressing toward goals. Will continue to monitor. See below and flowsheets for full assessment and VS info.     -R radial a-line placed and levo titrated d/t hypotension, maintaining MAPs >60 per MD Krystle Fuentes order  -Insulin drip increased to 1.5. Pt and family educated on the importance of reducing sugar intake (i.e, sugar free popsicles)   -CVPs 9, 13, 11  -SVO2 66%  -one time IVP lasix given      Neuro:  [place] Coma Scale  Best Eye Response: 4-->(E4) spontaneous  Best Motor Response: 6-->(M6) obeys commands  Best Verbal Response: 5-->(V5) oriented  [place] Coma Scale Score: 15  Assessment Qualifiers: patient not sedated/intubated  Pupil PERRLA: yes    24 hr Temp:  [98 °F (36.7 °C)-98.3 °F (36.8 °C)]      CV:  Rhythm: atrial rhythm   DVT prophylaxis: VTE Required Core Measure: Pharmacological prophylaxis initiated/maintained                            Pulses  Right Radial Pulse: 2+ (normal)  Left Radial Pulse: 2+ (normal)  Right Dorsalis Pedis Pulse: 1+ (weak)  Left Dorsalis Pedis Pulse: 1+ (weak)  Right Posterior Tibial Pulse: 1+ (weak)  Left Posterior Tibial Pulse: 1+ (weak)    Resp:  Flow (L/min): 2       GI/:  GI prophylaxis: no  Diet/Nutrition Received: low saturated fat/low cholesterol, 2 gram sodium  Last Bowel Movement: 03/22/24  Voiding Characteristics: voids spontaneously without difficulty   Intake/Output Summary (Last 24 hours) at 3/23/2024 0616  Last data filed at 3/23/2024 0301  Gross per 24 hour   Intake 240 ml   Output 1300 ml   Net -1060 ml          Labs/Accuchecks:  Recent Labs   Lab 03/22/24  1105 03/22/24  1109 03/23/24  0230   WBC  --  7.59 8.49   RBC  --  5.32 5.26   HGB  --  15.8 15.5   HCT 48 48.6 47.8   PLT  --  187 199    No results for input(s): "PT", "INR", "APTT" in the last 168 hours.   Recent Labs     03/23/24  0230      K 4.2   CO2 20*   *   BUN 18 " "  CREATININE 0.8   ALKPHOS 40*   ALT 20   AST 22   BILITOT 0.7       Recent Labs   Lab 03/22/24  1109 03/22/24  1336   TROPONINI <0.006 0.022    No results for input(s): "PH", "PCO2", "PO2", "HCO3", "POCSATURATED", "BE" in the last 72 hours.    Electrolytes: N/A - electrolytes WDL  Accuchecks: Q1H    Gtts/LDAs:      Lines/Drains/Airways       Peripheral Intravenous Line  Duration                  Peripheral IV - Single Lumen 10/02/20 1107 20 G Left Antecubital 1267 days                    Skin/Wounds  Bathing/Skin Care: (S) back care;bath, complete;dressed/undressed;electrode patches/site rotation;linen changed (03/22/24 1815)  Wounds: Yes  Wound care consulted: Yes     Problem: Adult Inpatient Plan of Care  Goal: Plan of Care Review  3/23/2024 0616 by Michaela Love RN  Outcome: Ongoing, Progressing  3/23/2024 0533 by Michaela Love RN  Outcome: Ongoing, Progressing  3/23/2024 0532 by Michaela Love RN  Outcome: Ongoing, Progressing  Goal: Patient-Specific Goal (Individualized)  3/23/2024 0616 by Michaela Love RN  Outcome: Ongoing, Progressing  3/23/2024 0533 by Michaela Love RN  Outcome: Ongoing, Progressing  Goal: Absence of Hospital-Acquired Illness or Injury  3/23/2024 0616 by Michaela Love RN  Outcome: Ongoing, Progressing  3/23/2024 0533 by Michaela Love RN  Outcome: Ongoing, Progressing  Goal: Optimal Comfort and Wellbeing  3/23/2024 0616 by Michaela Love RN  Outcome: Ongoing, Progressing  3/23/2024 0533 by Michaela Love RN  Outcome: Ongoing, Progressing  Goal: Readiness for Transition of Care  3/23/2024 0616 by Michaela Love RN  Outcome: Ongoing, Progressing  3/23/2024 0533 by Michaela Love RN  Outcome: Ongoing, Progressing     Problem: Infection  Goal: Absence of Infection Signs and Symptoms  3/23/2024 0616 by Michaela Love RN  Outcome: Ongoing, Progressing  3/23/2024 0533 by Ivan, Michaela, RN  Outcome: Ongoing, Progressing     Problem: Fall Injury Risk  Goal: Absence of Fall and Fall-Related Injury  3/23/2024 " 0616 by Michaela Love, RN  Outcome: Ongoing, Progressing  3/23/2024 0533 by Michaela Love, RN  Outcome: Ongoing, Progressing

## 2024-03-23 NOTE — CONSULTS
Consult Note  Cardiothoracic Surgery    Inpatient consult to Cardiothoracic Surgery  Consult performed by: Fernando Ray MD  Consult ordered by: Dakotah Beasley MD        SUBJECTIVE:     History of Present Illness:  Gerald Wilkinson is a 73 y.o. male with PMH of HTN admitted following positive stress test. He reports intermittent chest pain and tightness for the past 10 days. This led to outpatient stress test which was positive. He was then told to go to the ED for admission and LHC. LHC done yesterday revealved multivessel CAD including severe LM disease. He was loaded with Plavix and started on Heparin gtt. Currently the patient denies any chest pain. This morning he did go into afib w/ RVR while getting up to use the rest room.   He reports normally being very active. His work involves lifting heavy equipment routinely.     Denies tobacco, EtOH. No prior sternotomies.     Scheduled Meds:   aspirin  81 mg Oral Daily    atorvastatin  80 mg Oral Daily    metoprolol tartrate  25 mg Oral BID    multivitamin  1 tablet Oral Daily    polyethylene glycol  17 g Oral Daily     Infusions/Drips:   heparin (porcine) in D5W 12 Units/kg/hr (03/23/24 0859)     PRN Meds:acetaminophen, albuterol-ipratropium, dextrose 10%, dextrose 10%, glucagon (human recombinant), glucose, glucose, heparin (PORCINE), heparin (PORCINE), hydrALAZINE, melatonin, metoprolol, naloxone, ondansetron, prochlorperazine, sodium chloride 0.9%, sodium chloride 0.9%    Review of patient's allergies indicates:  No Known Allergies    History reviewed. No pertinent past medical history.  History reviewed. No pertinent surgical history.  Family History   Problem Relation Age of Onset    Hypertension Mother     Cancer Mother     Hypertension Sister     Cancer Sister      Social History     Tobacco Use    Smoking status: Never    Smokeless tobacco: Never   Substance Use Topics    Alcohol use: No    Drug use: Yes        Review of Systems:  Review of Systems   HENT:  Negative.     Eyes: Negative.    Respiratory: Negative.     Cardiovascular:  Positive for chest pain. Negative for palpitations, orthopnea, claudication, leg swelling and PND.   Gastrointestinal: Negative.    Genitourinary: Negative.    Musculoskeletal: Negative.    Skin: Negative.    Neurological: Negative.    Endo/Heme/Allergies: Negative.    Psychiatric/Behavioral: Negative.     All other systems reviewed and are negative.       OBJECTIVE:     Vital Signs (Most Recent)  Temp: 98.1 °F (36.7 °C) (03/23/24 0301)  Pulse: 105 (03/23/24 0940)  Resp: 20 (03/23/24 0940)  BP: (!) 156/73 (03/23/24 0601)  SpO2: 98 % (03/23/24 0940)    Admission Weight: 80.5 kg (177 lb 8.2 oz) (03/22/24 1815)   Most Recent Weight: 80.5 kg (177 lb 8.2 oz) (03/22/24 1815)    Vital Signs Range (Last 24H):  Temp:  [98 °F (36.7 °C)-98.3 °F (36.8 °C)]   Pulse:  []   Resp:  [13-28]   BP: (113-178)/(57-92)   SpO2:  [95 %-99 %]     Physical Exam:  Physical Exam  Vitals and nursing note reviewed.   Constitutional:       Appearance: Normal appearance.   HENT:      Head: Normocephalic.      Mouth/Throat:      Mouth: Mucous membranes are moist.   Eyes:      Extraocular Movements: Extraocular movements intact.      Pupils: Pupils are equal, round, and reactive to light.   Cardiovascular:      Rate and Rhythm: Normal rate and regular rhythm.      Heart sounds: Normal heart sounds.   Pulmonary:      Effort: Pulmonary effort is normal.   Abdominal:      General: Abdomen is flat.      Palpations: Abdomen is soft.   Musculoskeletal:      Cervical back: Neck supple.   Skin:     General: Skin is warm.   Neurological:      General: No focal deficit present.      Mental Status: He is alert and oriented to person, place, and time.   Psychiatric:         Mood and Affect: Mood normal.         Behavior: Behavior normal.          Laboratory:  I have reviewed all pertinent lab results within the past 24 hours.    Diagnostic Results:  ECG: Reviewed  X-Ray:  Reviewed  Cardiac Cath: Reviewed    ASSESSMENT/PLAN:     73 y.o. male with HTN admitted following positive stress test for unstable angina. [facility] report pending but he has severe LM disease and ostial RCA disease. Plavix loaded 3/22    Recommend CABG x2 (LIMA-LAD, SVG-OM) and MAZE to address his new afib. Tentative surgery date 3/28/24  He will need pre-op studies including chest CT, carotid ultrasound, TTE  Continue Heparin gtt  Hold Plavix. Ok to continue ASA 81  Discontinue ARB    Fernando Ray MD  Cardiothoracic Surgery Fellow

## 2024-03-24 LAB
ALBUMIN SERPL BCP-MCNC: 3.5 G/DL (ref 3.5–5.2)
ALP SERPL-CCNC: 37 U/L (ref 55–135)
ALT SERPL W/O P-5'-P-CCNC: 19 U/L (ref 10–44)
ANION GAP SERPL CALC-SCNC: 6 MMOL/L (ref 8–16)
APTT PPP: 43 SEC (ref 21–32)
AST SERPL-CCNC: 17 U/L (ref 10–40)
BASOPHILS # BLD AUTO: 0.05 K/UL (ref 0–0.2)
BASOPHILS NFR BLD: 0.6 % (ref 0–1.9)
BILIRUB SERPL-MCNC: 0.7 MG/DL (ref 0.1–1)
BUN SERPL-MCNC: 18 MG/DL (ref 8–23)
CALCIUM SERPL-MCNC: 9.1 MG/DL (ref 8.7–10.5)
CHLORIDE SERPL-SCNC: 111 MMOL/L (ref 95–110)
CO2 SERPL-SCNC: 21 MMOL/L (ref 23–29)
CREAT SERPL-MCNC: 0.9 MG/DL (ref 0.5–1.4)
DIFFERENTIAL METHOD BLD: NORMAL
EOSINOPHIL # BLD AUTO: 0.3 K/UL (ref 0–0.5)
EOSINOPHIL NFR BLD: 3.1 % (ref 0–8)
ERYTHROCYTE [DISTWIDTH] IN BLOOD BY AUTOMATED COUNT: 12.7 % (ref 11.5–14.5)
EST. GFR  (NO RACE VARIABLE): >60 ML/MIN/1.73 M^2
GLUCOSE SERPL-MCNC: 100 MG/DL (ref 70–110)
HCT VFR BLD AUTO: 48.4 % (ref 40–54)
HGB BLD-MCNC: 15.9 G/DL (ref 14–18)
IMM GRANULOCYTES # BLD AUTO: 0.04 K/UL (ref 0–0.04)
IMM GRANULOCYTES NFR BLD AUTO: 0.5 % (ref 0–0.5)
LYMPHOCYTES # BLD AUTO: 1.9 K/UL (ref 1–4.8)
LYMPHOCYTES NFR BLD: 23.9 % (ref 18–48)
MAGNESIUM SERPL-MCNC: 2.1 MG/DL (ref 1.6–2.6)
MCH RBC QN AUTO: 30.1 PG (ref 27–31)
MCHC RBC AUTO-ENTMCNC: 32.9 G/DL (ref 32–36)
MCV RBC AUTO: 92 FL (ref 82–98)
MONOCYTES # BLD AUTO: 0.7 K/UL (ref 0.3–1)
MONOCYTES NFR BLD: 9 % (ref 4–15)
NEUTROPHILS # BLD AUTO: 5.1 K/UL (ref 1.8–7.7)
NEUTROPHILS NFR BLD: 62.9 % (ref 38–73)
NRBC BLD-RTO: 0 /100 WBC
PHOSPHATE SERPL-MCNC: 2.5 MG/DL (ref 2.7–4.5)
PLATELET # BLD AUTO: 234 K/UL (ref 150–450)
PMV BLD AUTO: 10.8 FL (ref 9.2–12.9)
POTASSIUM SERPL-SCNC: 3.9 MMOL/L (ref 3.5–5.1)
PROT SERPL-MCNC: 6.6 G/DL (ref 6–8.4)
RBC # BLD AUTO: 5.28 M/UL (ref 4.6–6.2)
SODIUM SERPL-SCNC: 138 MMOL/L (ref 136–145)
WBC # BLD AUTO: 8.11 K/UL (ref 3.9–12.7)

## 2024-03-24 PROCEDURE — 85025 COMPLETE CBC W/AUTO DIFF WBC: CPT | Performed by: STUDENT IN AN ORGANIZED HEALTH CARE EDUCATION/TRAINING PROGRAM

## 2024-03-24 PROCEDURE — 85730 THROMBOPLASTIN TIME PARTIAL: CPT | Performed by: STUDENT IN AN ORGANIZED HEALTH CARE EDUCATION/TRAINING PROGRAM

## 2024-03-24 PROCEDURE — 63600175 PHARM REV CODE 636 W HCPCS: Performed by: STUDENT IN AN ORGANIZED HEALTH CARE EDUCATION/TRAINING PROGRAM

## 2024-03-24 PROCEDURE — 25000003 PHARM REV CODE 250: Performed by: STUDENT IN AN ORGANIZED HEALTH CARE EDUCATION/TRAINING PROGRAM

## 2024-03-24 PROCEDURE — 99900035 HC TECH TIME PER 15 MIN (STAT)

## 2024-03-24 PROCEDURE — 99232 SBSQ HOSP IP/OBS MODERATE 35: CPT | Mod: GC,,, | Performed by: INTERNAL MEDICINE

## 2024-03-24 PROCEDURE — 84100 ASSAY OF PHOSPHORUS: CPT | Performed by: INTERNAL MEDICINE

## 2024-03-24 PROCEDURE — 94761 N-INVAS EAR/PLS OXIMETRY MLT: CPT

## 2024-03-24 PROCEDURE — 83735 ASSAY OF MAGNESIUM: CPT | Performed by: INTERNAL MEDICINE

## 2024-03-24 PROCEDURE — 80053 COMPREHEN METABOLIC PANEL: CPT | Performed by: INTERNAL MEDICINE

## 2024-03-24 PROCEDURE — 20600001 HC STEP DOWN PRIVATE ROOM

## 2024-03-24 RX ADMIN — METOPROLOL TARTRATE 25 MG: 25 TABLET, FILM COATED ORAL at 08:03

## 2024-03-24 RX ADMIN — POTASSIUM BICARBONATE 20 MEQ: 391 TABLET, EFFERVESCENT ORAL at 06:03

## 2024-03-24 RX ADMIN — HEPARIN SODIUM AND DEXTROSE 12 UNITS/KG/HR: 10000; 5 INJECTION INTRAVENOUS at 03:03

## 2024-03-24 RX ADMIN — ASPIRIN 81 MG: 81 TABLET, COATED ORAL at 08:03

## 2024-03-24 RX ADMIN — ATORVASTATIN CALCIUM 80 MG: 40 TABLET, FILM COATED ORAL at 08:03

## 2024-03-24 RX ADMIN — THERA TABS 1 TABLET: TAB at 08:03

## 2024-03-24 NOTE — PROGRESS NOTES
[facility] - Cardiology Stepdown  Cardiology  Progress Note    Patient Name: Gerald Wilkinson  MRN: [id number]  Admission Date: 3/22/2024  Hospital Length of Stay: 2 days  Code Status: Full Code   Attending Physician: Arturo Salmon MD   Primary Care Physician: Phil Tellez MD  Expected Discharge Date:   Principal Problem:Left main coronary artery thrombosis    Subjective:         Interval History: did well overnight. AF rates controlled. Plan for CABG on 3/28    Review of Systems   Constitutional: Negative for chills, diaphoresis and night sweats.   Cardiovascular:  Positive for irregular heartbeat. Negative for chest pain, dyspnea on exertion, leg swelling, near-syncope and palpitations.   Respiratory:  Negative for cough, shortness of breath and wheezing.    Skin:  Negative for color change and dry skin.   Musculoskeletal:  Negative for joint pain and joint swelling.   Gastrointestinal:  Negative for abdominal pain, diarrhea, nausea and vomiting.   Genitourinary:  Negative for dysuria.   Neurological:  Negative for dizziness, headaches, light-headedness and weakness.   All other systems reviewed and are negative.    Objective:     Vital Signs (Most Recent):  Temp: 97.9 °F (36.6 °C) (03/24/24 1259)  Pulse: (!) 116 (03/24/24 1339)  Resp: 18 (03/24/24 1259)  BP: 139/69 (03/24/24 1259)  SpO2: 98 % (03/24/24 1259) Vital Signs (24h Range):  Temp:  [97.8 °F (36.6 °C)-98.3 °F (36.8 °C)] 97.9 °F (36.6 °C)  Pulse:  [] 116  Resp:  [11-28] 18  SpO2:  [95 %-99 %] 98 %  BP: ()/(53-81) 139/69     Weight: 73.9 kg (162 lb 14.7 oz)  Body mass index is 24.06 kg/m².     SpO2: 98 %         Intake/Output Summary (Last 24 hours) at 3/24/2024 1448  Last data filed at 3/24/2024 1356  Gross per 24 hour   Intake 1036.96 ml   Output 2150 ml   Net -1113.04 ml         Lines/Drains/Airways       Peripheral Intravenous Line  Duration                  Peripheral IV - Single Lumen 03/22/24 1300 18 G Left Antecubital 2 days          Peripheral IV - Single Lumen 03/23/24 1430 18 G Anterior;Right Forearm 1 day                       Physical Exam  Vitals and nursing note reviewed.   Constitutional:       Appearance: Normal appearance.   Eyes:      Extraocular Movements: Extraocular movements intact.      Conjunctiva/sclera: Conjunctivae normal.   Cardiovascular:      Rate and Rhythm: Normal rate. Rhythm irregular.      Comments: No JVD  Pulmonary:      Effort: Pulmonary effort is normal.      Breath sounds: Normal breath sounds. No rales.   Abdominal:      General: Bowel sounds are normal.      Palpations: Abdomen is soft.   Musculoskeletal:      Cervical back: Normal range of motion.      Right lower leg: No edema.      Left lower leg: No edema.   Skin:     General: Skin is warm and dry.   Neurological:      General: No focal deficit present.      Mental Status: He is alert and oriented to person, place, and time.            Significant Labs: All pertinent lab results from the last 24 hours have been reviewed.    Significant Imaging:  reviewed  Assessment and Plan:         * Left main coronary artery thrombosis  Interventional cardiology consulted with cath demonstrating LM disease. S/p ASA & Plavix load. Lipid panel 3 mo. prior WNL.    - Planned surgical intervention 03/28/24 with CABG x2 (LIMA-LAD, SVG-OM) and MAZE to address his new afib. Tentative surgery date 3/28/24  - CTS ordered and f/u pre-op studies (chest CT, carotid ultrasound, TTE)   - Continue Heparin gtt  - Hold Plavix. Continue ASA 81  - Holding ARB  - Atorvastatin 80 mg daily  - General cardiology follow-up on DC    Paroxysmal atrial fibrillation with rapid ventricular response  New onset; CHADSVASC 4 (age, HTN, CAD).     - Lopressor 25 mg BID and IV metoprolol; goal HR < 120 BPM  - Heparin gtt for anticoagulation can convert to DOAC afterwards  - f/u TTE  - Monitor on telemetry  - K>4, Mag>2  - EP referral on DC      Positive cardiac stress test  See LM    Benign essential  hypertension  Continue Valsartan  BP goal < 130/80 mmHg        VTE Risk Mitigation (From admission, onward)           Ordered     heparin 25,000 units in dextrose 5% (100 units/ml) IV bolus from bag LOW INTENSITY nomogram - [facility]  As needed (PRN)        Question:  Heparin Infusion Adjustment (DO NOT MODIFY ANSWER)  Answer:  [email or web address]\epic\Images\Pharmacy\HeparinInfusions\heparin LOW INTENSITY nomogram for [facility] [id number].pdf    03/23/24 0818     heparin 25,000 units in dextrose 5% (100 units/ml) IV bolus from bag LOW INTENSITY nomogram - [facility]  As needed (PRN)        Question:  Heparin Infusion Adjustment (DO NOT MODIFY ANSWER)  Answer:  [email or web address]\epic\Images\Pharmacy\HeparinInfusions\heparin LOW INTENSITY nomogram for [facility] [id number].pdf    03/23/24 0818     heparin 25,000 units in dextrose 5% 250 mL (100 units/mL) infusion LOW INTENSITY nomogram - [facility]  Continuous        Question:  Begin at (units/kg/hr)  Answer:  12    03/23/24 0818     IP VTE HIGH RISK PATIENT  Once         03/22/24 1312     Place sequential compression device  Until discontinued         03/22/24 1312                    Rory Keenor, MD  Cardiology  [facility] - Cardiology Stepdown

## 2024-03-24 NOTE — PLAN OF CARE
"Cardiac ICU Care Plan    POC reviewed with Gerald THOMPSON Minh and family. Questions and concerns addressed. No acute events today. Pt progressing toward goals. Will continue to monitor. See below and flowsheets for full assessment and VS info.       Neuro:  [place] Coma Scale  Best Eye Response: 4-->(E4) spontaneous  Best Motor Response: 6-->(M6) obeys commands  Best Verbal Response: 5-->(V5) oriented  [place] Coma Scale Score: 15  Assessment Qualifiers: patient not sedated/intubated  Pupil PERRLA: yes    24 hr Temp:  [98 °F (36.7 °C)-98.5 °F (36.9 °C)]      CV:  Rhythm: atrial rhythm   DVT prophylaxis: VTE Required Core Measure: Pharmacological prophylaxis initiated/maintained                            Pulses  Right Radial Pulse: 2+ (normal)  Left Radial Pulse: 2+ (normal)  Right Dorsalis Pedis Pulse: 1+ (weak)  Left Dorsalis Pedis Pulse: 1+ (weak)  Right Posterior Tibial Pulse: 1+ (weak)  Left Posterior Tibial Pulse: 1+ (weak)    Resp:  Flow (L/min): 2       GI/:  GI prophylaxis: no  Diet/Nutrition Received: 2 gram sodium, low saturated fat/low cholesterol  Last Bowel Movement: 03/22/24  Voiding Characteristics: voids spontaneously without difficulty   Intake/Output Summary (Last 24 hours) at 3/24/2024 0537  Last data filed at 3/24/2024 0501  Gross per 24 hour   Intake 749.08 ml   Output 2250 ml   Net -1500.92 ml      Labs/Accuchecks:  Recent Labs   Lab 03/22/24  1109 03/23/24  0230 03/24/24  0346   WBC 7.59 8.49 8.11   RBC 5.32 5.26 5.28   HGB 15.8 15.5 15.9   HCT 48.6 47.8 48.4    199 234      Recent Labs   Lab 03/23/24  1446 03/23/24 2029 03/24/24  0346   APTT 47.6* 42.8* 43.0*      Recent Labs     03/23/24  0230      K 4.2   CO2 20*   *   BUN 18   CREATININE 0.8   ALKPHOS 40*   ALT 20   AST 22   BILITOT 0.7       Recent Labs   Lab 03/22/24  1109 03/22/24  1336   TROPONINI <0.006 0.022    No results for input(s): "PH", "PCO2", "PO2", "HCO3", "POCSATURATED", "BE" in the last 72 " hours.    Electrolytes: Replacements given  Accuchecks: none    Gtts/LDAs:   heparin (porcine) in D5W 12 Units/kg/hr (03/24/24 0501)       Lines/Drains/Airways       Peripheral Intravenous Line  Duration                  Peripheral IV - Single Lumen 03/22/24 1300 18 G Left Antecubital 1 day         Peripheral IV - Single Lumen 03/23/24 1430 18 G Anterior;Right Forearm <1 day                    Skin/Wounds  Bathing/Skin Care: (S) back care;bath, complete;cervical collar care;electrode patches/site rotation;linen changed (03/23/24 1350)  Wounds: No  Wound care consulted: No    Problem: Adult Inpatient Plan of Care  Goal: Plan of Care Review  Outcome: Ongoing, Progressing  Goal: Patient-Specific Goal (Individualized)  Outcome: Ongoing, Progressing  Goal: Absence of Hospital-Acquired Illness or Injury  Outcome: Ongoing, Progressing  Goal: Optimal Comfort and Wellbeing  Outcome: Ongoing, Progressing  Goal: Readiness for Transition of Care  Outcome: Ongoing, Progressing     Problem: Infection  Goal: Absence of Infection Signs and Symptoms  Outcome: Ongoing, Progressing     Problem: Fall Injury Risk  Goal: Absence of Fall and Fall-Related Injury  Outcome: Ongoing, Progressing

## 2024-03-24 NOTE — NURSING
Nurses Note -- 4 Eyes      3/24/2024   12:50 PM      Skin assessed during: Transfer      [x] No Altered Skin Integrity Present    []Prevention Measures Documented      [] Yes- Altered Skin Integrity Present or Discovered   [] LDA Added if Not in Epic (Describe Wound)   [] New Altered Skin Integrity was Present on Admit and Documented in LDA   [] Wound Image Taken    Wound Care Consulted? No    Attending Nurse: LINDA Buenrostro RN     Second RN/Staff Member:  RACHEL Walker RN

## 2024-03-24 NOTE — PLAN OF CARE
Problem: Adult Inpatient Plan of Care  Goal: Plan of Care Review  Outcome: Ongoing, Progressing  Goal: Patient-Specific Goal (Individualized)  Outcome: Ongoing, Progressing  Goal: Optimal Comfort and Wellbeing  Outcome: Ongoing, Progressing   Cardiac ICU Care Plan    POC reviewed with Gerald Wilkinson and family. Questions and concerns addressed. See below and flowsheets for full assessment and VS info.       Per CTS plan for possible CABG x2 on Thursday 03/28. Heparin gtt started, and is at 12. 5mg lopressor IVP given when HR jumped to 160s. MD  aware and came to bedside. Lopressor 25mg PO given as well.     Neuro:  [place] Coma Scale  Best Eye Response: 4-->(E4) spontaneous  Best Motor Response: 6-->(M6) obeys commands  Best Verbal Response: 5-->(V5) oriented  [place] Coma Scale Score: 15  Assessment Qualifiers: patient not sedated/intubated  Pupil PERRLA: yes    24 hr Temp:  [98 °F (36.7 °C)-98.5 °F (36.9 °C)]      CV:  Rhythm: atrial rhythm   DVT prophylaxis: VTE Required Core Measure: Pharmacological prophylaxis initiated/maintained                            Pulses  Right Radial Pulse: 2+ (normal)  Left Radial Pulse: 2+ (normal)  Right Dorsalis Pedis Pulse: 1+ (weak)  Left Dorsalis Pedis Pulse: 1+ (weak)  Right Posterior Tibial Pulse: 1+ (weak)  Left Posterior Tibial Pulse: 1+ (weak)    Resp:  Flow (L/min): 2       GI/:  GI prophylaxis: yes  Diet/Nutrition Received: 2 gram sodium, low saturated fat/low cholesterol  Last Bowel Movement: 03/22/24  Voiding Characteristics: voids spontaneously without difficulty   Intake/Output Summary (Last 24 hours) at 3/23/2024 1934  Last data filed at 3/23/2024 1824  Gross per 24 hour   Intake 650.56 ml   Output 2200 ml   Net -1549.44 ml         Labs/Accuchecks:  Recent Labs   Lab 03/22/24  1105 03/22/24  1109 03/23/24  0230   WBC  --  7.59 8.49   RBC  --  5.32 5.26   HGB  --  15.8 15.5   HCT 48 48.6 47.8   PLT  --  187 199      Recent Labs   Lab 03/23/24  1446   APTT  "47.6*      Recent Labs     03/23/24  0230      K 4.2   CO2 20*   *   BUN 18   CREATININE 0.8   ALKPHOS 40*   ALT 20   AST 22   BILITOT 0.7       Recent Labs   Lab 03/22/24  1109 03/22/24  1336   TROPONINI <0.006 0.022    No results for input(s): "PH", "PCO2", "PO2", "HCO3", "POCSATURATED", "BE" in the last 72 hours.    Electrolytes: N/A - electrolytes WDL  Accuchecks: none    Gtts/LDAs:   heparin (porcine) in D5W 12 Units/kg/hr (03/23/24 1801)       Lines/Drains/Airways       Peripheral Intravenous Line  Duration                  Peripheral IV - Single Lumen 10/02/20 1107 20 G Left Antecubital 1268 days         Peripheral IV - Single Lumen 03/23/24 1430 18 G Anterior;Right Forearm <1 day                    Skin/Wounds  Bathing/Skin Care: (S) back care;bath, complete;cervical collar care;electrode patches/site rotation;linen changed (03/23/24 1350)  Wounds: No  Wound care consulted: No   "

## 2024-03-24 NOTE — PLAN OF CARE
Problem: Adult Inpatient Plan of Care  Goal: Plan of Care Review  Outcome: Ongoing, Progressing  Goal: Optimal Comfort and Wellbeing  Outcome: Ongoing, Progressing     Problem: Infection  Goal: Absence of Infection Signs and Symptoms  Outcome: Ongoing, Progressing     Problem: Fall Injury Risk  Goal: Absence of Fall and Fall-Related Injury  Outcome: Ongoing, Progressing

## 2024-03-24 NOTE — SUBJECTIVE & OBJECTIVE
Interval History: did well overnight. AF rates controlled. Plan for CABG on 3/28    Review of Systems   Constitutional: Negative for chills, diaphoresis and night sweats.   Cardiovascular:  Positive for irregular heartbeat. Negative for chest pain, dyspnea on exertion, leg swelling, near-syncope and palpitations.   Respiratory:  Negative for cough, shortness of breath and wheezing.    Skin:  Negative for color change and dry skin.   Musculoskeletal:  Negative for joint pain and joint swelling.   Gastrointestinal:  Negative for abdominal pain, diarrhea, nausea and vomiting.   Genitourinary:  Negative for dysuria.   Neurological:  Negative for dizziness, headaches, light-headedness and weakness.   All other systems reviewed and are negative.    Objective:     Vital Signs (Most Recent):  Temp: 97.9 °F (36.6 °C) (03/24/24 1259)  Pulse: (!) 116 (03/24/24 1339)  Resp: 18 (03/24/24 1259)  BP: 139/69 (03/24/24 1259)  SpO2: 98 % (03/24/24 1259) Vital Signs (24h Range):  Temp:  [97.8 °F (36.6 °C)-98.3 °F (36.8 °C)] 97.9 °F (36.6 °C)  Pulse:  [] 116  Resp:  [11-28] 18  SpO2:  [95 %-99 %] 98 %  BP: ()/(53-81) 139/69     Weight: 73.9 kg (162 lb 14.7 oz)  Body mass index is 24.06 kg/m².     SpO2: 98 %         Intake/Output Summary (Last 24 hours) at 3/24/2024 1448  Last data filed at 3/24/2024 1356  Gross per 24 hour   Intake 1036.96 ml   Output 2150 ml   Net -1113.04 ml         Lines/Drains/Airways       Peripheral Intravenous Line  Duration                  Peripheral IV - Single Lumen 03/22/24 1300 18 G Left Antecubital 2 days         Peripheral IV - Single Lumen 03/23/24 1430 18 G Anterior;Right Forearm 1 day                       Physical Exam  Vitals and nursing note reviewed.   Constitutional:       Appearance: Normal appearance.   Eyes:      Extraocular Movements: Extraocular movements intact.      Conjunctiva/sclera: Conjunctivae normal.   Cardiovascular:      Rate and Rhythm: Normal rate. Rhythm irregular.       Comments: No JVD  Pulmonary:      Effort: Pulmonary effort is normal.      Breath sounds: Normal breath sounds. No rales.   Abdominal:      General: Bowel sounds are normal.      Palpations: Abdomen is soft.   Musculoskeletal:      Cervical back: Normal range of motion.      Right lower leg: No edema.      Left lower leg: No edema.   Skin:     General: Skin is warm and dry.   Neurological:      General: No focal deficit present.      Mental Status: He is alert and oriented to person, place, and time.            Significant Labs: All pertinent lab results from the last 24 hours have been reviewed.    Significant Imaging:  reviewed

## 2024-03-24 NOTE — NURSING
Patient transferred to CSU by RN x1 on R. No s/s of distress reported or assessed by RN. All belongings transported w patient. RN gave report and placed pt on telemetry prior to CSU transfer.

## 2024-03-25 LAB
ALBUMIN SERPL BCP-MCNC: 3.6 G/DL (ref 3.5–5.2)
ALP SERPL-CCNC: 35 U/L (ref 55–135)
ALT SERPL W/O P-5'-P-CCNC: 18 U/L (ref 10–44)
ANION GAP SERPL CALC-SCNC: 10 MMOL/L (ref 8–16)
APTT PPP: 48.1 SEC (ref 21–32)
ASCENDING AORTA: 3.42 CM
AST SERPL-CCNC: 16 U/L (ref 10–40)
AV INDEX (PROSTH): 0.81
AV MEAN GRADIENT: 4 MMHG
AV PEAK GRADIENT: 6 MMHG
AV VALVE AREA BY VELOCITY RATIO: 2.1 CM²
AV VALVE AREA: 2.7 CM²
AV VELOCITY RATIO: 0.63
BASOPHILS # BLD AUTO: 0.06 K/UL (ref 0–0.2)
BASOPHILS NFR BLD: 0.8 % (ref 0–1.9)
BILIRUB SERPL-MCNC: 0.5 MG/DL (ref 0.1–1)
BSA FOR ECHO PROCEDURE: 1.86 M2
BUN SERPL-MCNC: 21 MG/DL (ref 8–23)
CALCIUM SERPL-MCNC: 9.4 MG/DL (ref 8.7–10.5)
CHLORIDE SERPL-SCNC: 107 MMOL/L (ref 95–110)
CO2 SERPL-SCNC: 22 MMOL/L (ref 23–29)
CREAT SERPL-MCNC: 1.1 MG/DL (ref 0.5–1.4)
CV ECHO LV RWT: 0.43 CM
DIFFERENTIAL METHOD BLD: NORMAL
DOP CALC AO PEAK VEL: 1.27 M/S
DOP CALC AO VTI: 22.06 CM
DOP CALC LVOT AREA: 3.3 CM2
DOP CALC LVOT DIAMETER: 2.06 CM
DOP CALC LVOT PEAK VEL: 0.8 M/S
DOP CALC LVOT STROKE VOLUME: 59.63 CM3
DOP CALCLVOT PEAK VEL VTI: 17.9 CM
E WAVE DECELERATION TIME: 265.5 MSEC
E/A RATIO: 0.91
E/E' RATIO: 8.29 M/S
ECHO LV POSTERIOR WALL: 0.91 CM (ref 0.6–1.1)
EOSINOPHIL # BLD AUTO: 0.4 K/UL (ref 0–0.5)
EOSINOPHIL NFR BLD: 4.8 % (ref 0–8)
ERYTHROCYTE [DISTWIDTH] IN BLOOD BY AUTOMATED COUNT: 12.6 % (ref 11.5–14.5)
EST. GFR  (NO RACE VARIABLE): >60 ML/MIN/1.73 M^2
FRACTIONAL SHORTENING: 33 % (ref 28–44)
GLUCOSE SERPL-MCNC: 90 MG/DL (ref 70–110)
HCT VFR BLD AUTO: 49.8 % (ref 40–54)
HGB BLD-MCNC: 16.2 G/DL (ref 14–18)
IMM GRANULOCYTES # BLD AUTO: 0.03 K/UL (ref 0–0.04)
IMM GRANULOCYTES NFR BLD AUTO: 0.4 % (ref 0–0.5)
INTERVENTRICULAR SEPTUM: 0.7 CM (ref 0.6–1.1)
LA MAJOR: 3.96 CM
LA MINOR: 4.33 CM
LA WIDTH: 3.44 CM
LEFT ATRIUM SIZE: 3 CM
LEFT ATRIUM VOLUME INDEX MOD: 19.8 ML/M2
LEFT ATRIUM VOLUME INDEX: 19.4 ML/M2
LEFT ATRIUM VOLUME MOD: 37.03 CM3
LEFT ATRIUM VOLUME: 36.29 CM3
LEFT INTERNAL DIMENSION IN SYSTOLE: 2.79 CM (ref 2.1–4)
LEFT VENTRICLE DIASTOLIC VOLUME INDEX: 41.7 ML/M2
LEFT VENTRICLE DIASTOLIC VOLUME: 77.97 ML
LEFT VENTRICLE MASS INDEX: 54 G/M2
LEFT VENTRICLE SYSTOLIC VOLUME INDEX: 15.7 ML/M2
LEFT VENTRICLE SYSTOLIC VOLUME: 29.4 ML
LEFT VENTRICULAR INTERNAL DIMENSION IN DIASTOLE: 4.19 CM (ref 3.5–6)
LEFT VENTRICULAR MASS: 101.73 G
LV LATERAL E/E' RATIO: 6.44 M/S
LV SEPTAL E/E' RATIO: 11.6 M/S
LYMPHOCYTES # BLD AUTO: 1.9 K/UL (ref 1–4.8)
LYMPHOCYTES NFR BLD: 25.2 % (ref 18–48)
MAGNESIUM SERPL-MCNC: 2.1 MG/DL (ref 1.6–2.6)
MCH RBC QN AUTO: 29.6 PG (ref 27–31)
MCHC RBC AUTO-ENTMCNC: 32.5 G/DL (ref 32–36)
MCV RBC AUTO: 91 FL (ref 82–98)
MONOCYTES # BLD AUTO: 0.7 K/UL (ref 0.3–1)
MONOCYTES NFR BLD: 9 % (ref 4–15)
MV A" WAVE DURATION": 4.57 MSEC
MV PEAK A VEL: 0.64 M/S
MV PEAK E VEL: 0.58 M/S
MV STENOSIS PRESSURE HALF TIME: 76.99 MS
MV VALVE AREA P 1/2 METHOD: 2.86 CM2
NEUTROPHILS # BLD AUTO: 4.6 K/UL (ref 1.8–7.7)
NEUTROPHILS NFR BLD: 59.8 % (ref 38–73)
NRBC BLD-RTO: 0 /100 WBC
PHOSPHATE SERPL-MCNC: 3 MG/DL (ref 2.7–4.5)
PISA TR MAX VEL: 2.42 M/S
PLATELET # BLD AUTO: 232 K/UL (ref 150–450)
PMV BLD AUTO: 11 FL (ref 9.2–12.9)
POTASSIUM SERPL-SCNC: 3.8 MMOL/L (ref 3.5–5.1)
PROT SERPL-MCNC: 6.6 G/DL (ref 6–8.4)
PULM VEIN S/D RATIO: 2.06
PV PEAK D VEL: 0.33 M/S
PV PEAK S VEL: 0.68 M/S
RA MAJOR: 4.35 CM
RA PRESSURE ESTIMATED: 3 MMHG
RA WIDTH: 3.81 CM
RBC # BLD AUTO: 5.48 M/UL (ref 4.6–6.2)
RIGHT VENTRICULAR END-DIASTOLIC DIMENSION: 4.2 CM
RV TB RVSP: 5 MMHG
SINUS: 3.46 CM
SODIUM SERPL-SCNC: 139 MMOL/L (ref 136–145)
STJ: 3.26 CM
TDI LATERAL: 0.09 M/S
TDI SEPTAL: 0.05 M/S
TDI: 0.07 M/S
TR MAX PG: 23 MMHG
TRICUSPID ANNULAR PLANE SYSTOLIC EXCURSION: 1.81 CM
TV REST PULMONARY ARTERY PRESSURE: 26 MMHG
WBC # BLD AUTO: 7.66 K/UL (ref 3.9–12.7)
Z-SCORE OF LEFT VENTRICULAR DIMENSION IN END DIASTOLE: -2.18
Z-SCORE OF LEFT VENTRICULAR DIMENSION IN END SYSTOLE: -1.12

## 2024-03-25 PROCEDURE — 99231 SBSQ HOSP IP/OBS SF/LOW 25: CPT | Mod: GC,,, | Performed by: INTERNAL MEDICINE

## 2024-03-25 PROCEDURE — 25000003 PHARM REV CODE 250: Performed by: STUDENT IN AN ORGANIZED HEALTH CARE EDUCATION/TRAINING PROGRAM

## 2024-03-25 PROCEDURE — 94761 N-INVAS EAR/PLS OXIMETRY MLT: CPT

## 2024-03-25 PROCEDURE — 85730 THROMBOPLASTIN TIME PARTIAL: CPT | Performed by: STUDENT IN AN ORGANIZED HEALTH CARE EDUCATION/TRAINING PROGRAM

## 2024-03-25 PROCEDURE — 20600001 HC STEP DOWN PRIVATE ROOM

## 2024-03-25 PROCEDURE — 85025 COMPLETE CBC W/AUTO DIFF WBC: CPT | Performed by: STUDENT IN AN ORGANIZED HEALTH CARE EDUCATION/TRAINING PROGRAM

## 2024-03-25 PROCEDURE — 80053 COMPREHEN METABOLIC PANEL: CPT | Performed by: STUDENT IN AN ORGANIZED HEALTH CARE EDUCATION/TRAINING PROGRAM

## 2024-03-25 PROCEDURE — 63600175 PHARM REV CODE 636 W HCPCS: Performed by: STUDENT IN AN ORGANIZED HEALTH CARE EDUCATION/TRAINING PROGRAM

## 2024-03-25 PROCEDURE — 84100 ASSAY OF PHOSPHORUS: CPT | Performed by: STUDENT IN AN ORGANIZED HEALTH CARE EDUCATION/TRAINING PROGRAM

## 2024-03-25 PROCEDURE — 83735 ASSAY OF MAGNESIUM: CPT | Performed by: STUDENT IN AN ORGANIZED HEALTH CARE EDUCATION/TRAINING PROGRAM

## 2024-03-25 PROCEDURE — 25000003 PHARM REV CODE 250

## 2024-03-25 PROCEDURE — 36415 COLL VENOUS BLD VENIPUNCTURE: CPT | Performed by: STUDENT IN AN ORGANIZED HEALTH CARE EDUCATION/TRAINING PROGRAM

## 2024-03-25 RX ADMIN — METOPROLOL TARTRATE 25 MG: 25 TABLET, FILM COATED ORAL at 09:03

## 2024-03-25 RX ADMIN — POTASSIUM BICARBONATE 20 MEQ: 391 TABLET, EFFERVESCENT ORAL at 09:03

## 2024-03-25 RX ADMIN — HEPARIN SODIUM AND DEXTROSE 12 UNITS/KG/HR: 10000; 5 INJECTION INTRAVENOUS at 03:03

## 2024-03-25 RX ADMIN — ASPIRIN 81 MG: 81 TABLET, COATED ORAL at 09:03

## 2024-03-25 RX ADMIN — METOPROLOL TARTRATE 25 MG: 25 TABLET, FILM COATED ORAL at 08:03

## 2024-03-25 RX ADMIN — ATORVASTATIN CALCIUM 80 MG: 40 TABLET, FILM COATED ORAL at 09:03

## 2024-03-25 RX ADMIN — THERA TABS 1 TABLET: TAB at 09:03

## 2024-03-25 NOTE — ASSESSMENT & PLAN NOTE
Interventional cardiology consulted with cath demonstrating LM disease. S/p ASA & Plavix load. Lipid panel 3 mo. prior WNL. CT chest with coronary atherosclerosis, pulmonary nodules (will need repeat imaging in the future), hepatic steatosis, mesenteric panniculitis. Carotid ultrasound: no evidence significant carotid bifurcation stenosis. TTE with EF 60-65% with normal diastolic dysfunction.    - Planned surgical intervention 03/28/24 with CABG x2 (LIMA-LAD, SVG-OM) and MAZE to address his new afib. Tentative surgery date 3/28/24  - CTS ordered and f/u pre-op studies   - Continue Heparin gtt  - Hold Plavix. Continue ASA 81  - Holding ARB  - Atorvastatin 80 mg daily  - General cardiology follow-up on DC

## 2024-03-25 NOTE — SUBJECTIVE & OBJECTIVE
Interval History: Mr. Wilkinson is doing well. He remains asymptomatic. Afib rate controlled on lopressor. TTE today.    Review of Systems   Constitutional: Negative for chills and diaphoresis.   Cardiovascular:  Positive for irregular heartbeat. Negative for chest pain and dyspnea on exertion.   Respiratory:  Negative for cough, shortness of breath and wheezing.    Skin:  Negative for dry skin.   Gastrointestinal:  Negative for abdominal pain, diarrhea, nausea and vomiting.   Genitourinary:  Negative for dysuria.   Neurological:  Negative for dizziness, headaches, light-headedness and weakness.   All other systems reviewed and are negative.    Objective:     Vital Signs (Most Recent):  Temp: 97.8 °F (36.6 °C) (03/25/24 0504)  Pulse: 85 (03/25/24 0745)  Resp: 17 (03/25/24 0504)  BP: 129/68 (03/25/24 0745)  SpO2: 99 % (03/25/24 0504) Vital Signs (24h Range):  Temp:  [97.1 °F (36.2 °C)-98 °F (36.7 °C)] 97.8 °F (36.6 °C)  Pulse:  [] 85  Resp:  [17-25] 17  SpO2:  [95 %-99 %] 99 %  BP: (106-141)/(62-84) 129/68     Weight: 71.4 kg (157 lb 6.5 oz)  Body mass index is 23.25 kg/m².     SpO2: 99 %         Intake/Output Summary (Last 24 hours) at 3/25/2024 0920  Last data filed at 3/25/2024 0710  Gross per 24 hour   Intake 266.87 ml   Output 800 ml   Net -533.13 ml       Lines/Drains/Airways       Peripheral Intravenous Line  Duration                  Peripheral IV - Single Lumen 03/22/24 1300 18 G Left Antecubital 2 days         Peripheral IV - Single Lumen 03/23/24 1430 18 G Anterior;Right Forearm 1 day                       Physical Exam  Vitals and nursing note reviewed.   Constitutional:       Appearance: Normal appearance.   Eyes:      Extraocular Movements: Extraocular movements intact.      Conjunctiva/sclera: Conjunctivae normal.   Cardiovascular:      Rate and Rhythm: Normal rate. Rhythm irregular.      Comments: No JVD  Pulmonary:      Effort: Pulmonary effort is normal.      Breath sounds: Normal breath sounds.  No rales.   Abdominal:      General: Bowel sounds are normal.      Palpations: Abdomen is soft.   Musculoskeletal:      Cervical back: Normal range of motion.      Right lower leg: No edema.      Left lower leg: No edema.   Skin:     General: Skin is warm and dry.   Neurological:      General: No focal deficit present.      Mental Status: He is alert and oriented to person, place, and time.            Significant Labs: CMP   Recent Labs   Lab 03/24/24  0514 03/25/24  0501    139   K 3.9 3.8   * 107   CO2 21* 22*    90   BUN 18 21   CREATININE 0.9 1.1   CALCIUM 9.1 9.4   PROT 6.6 6.6   ALBUMIN 3.5 3.6   BILITOT 0.7 0.5   ALKPHOS 37* 35*   AST 17 16   ALT 19 18   ANIONGAP 6* 10    and CBC   Recent Labs   Lab 03/24/24  0346 03/25/24  0501   WBC 8.11 7.66   HGB 15.9 16.2   HCT 48.4 49.8    232       Significant Imaging:  All pertinent lab results from the last 24 hours have been reviewed.

## 2024-03-25 NOTE — PLAN OF CARE
[facility] - Cardiology Stepdown  Initial Discharge Assessment       Primary Care Provider: Phil Tellez MD    Admission Diagnosis: Atrial fibrillation with rapid ventricular response [I48.91]  Chest pain [R07.9]    Admission Date: 3/22/2024  Expected Discharge Date: 4/1/2024    Transition of Care Barriers: None    Payor: [facility] MCARE / Plan: [employer] GROUP MEDICARE ADVANTAGE / Product Type: Medicare Advantage /     Extended Emergency Contact Information  Primary Emergency Contact: Shanna Wilkinson  Address: [facility] [place] [address] [facility]  Home Phone: [phone]  Mobile Phone: [phone]  Relation: Spouse    Discharge Plan A: Home with family  Discharge Plan B: [id number] - [place], [place] - [address] AT [facility] [address]  [address]  [facility] [address]  Phone: [phone] Fax: [phone]      Initial Assessment (most recent)       Adult Discharge Assessment - 03/25/24 1200          Discharge Assessment    Assessment Type Discharge Planning Assessment     Confirmed/corrected address, phone number and insurance Yes     Confirmed Demographics Correct on Facesheet     Source of Information patient;health record     Communicated AISHWARYA with patient/caregiver Yes     Reason For Admission Left main coronary artery thrombosis     People in Home spouse     Facility Arrived From: [facility] ([facility]]     Do you expect to return to your current living situation? Yes     Do you have help at home or someone to help you manage your care at home? Yes     Who are your caregiver(s) and their phone number(s)? Shanna Wilkinson (wife) [phone]     Prior to hospitilization cognitive status: Alert/Oriented     Current cognitive status: Alert/Oriented     Walking or Climbing Stairs Difficulty no     Dressing/Bathing Difficulty no     Equipment Currently Used at Home none     Readmission within 30  days? No     Patient currently being followed by outpatient case management? No     Do you currently have service(s) that help you manage your care at home? No     Do you take prescription medications? Yes     Do you have prescription coverage? Yes     Do you have any problems affording any of your prescribed medications? No     Is the patient taking medications as prescribed? yes     Who is going to help you get home at discharge? Wife or son     How do you get to doctors appointments? car, drives self     Are you on dialysis? No     Do you take coumadin? No     Discharge Plan A Home with family     Discharge Plan B Home Health     DME Needed Upon Discharge  none     Discharge Plan discussed with: Patient     Transition of Care Barriers None                 Pt scheduled for CABG on 3/27.  SW met with pt to discuss discharge planning.  Pt lives with his wife and is independent with ambulation and ADLs.  Pt will have transportation and assistance from his wife and sons at discharge.  Discharge Plan A and Plan B have been determined by review of patient's clinical status, future medical and therapeutic needs, and coverage/benefits for post-acute care in coordination with multidisciplinary team members.  BRIDGETTE name and ext on whiteboard; discharge planning booklet provided.  Will continue to follow.      Elsa Garcia LMSW  [facility]  [id number]

## 2024-03-25 NOTE — PROGRESS NOTES
[facility]  Cardiology  Progress Note    Patient Name: Gerald Wilkinson  MRN: [id number]  Admission Date: 3/22/2024  Hospital Length of Stay: 3 days  Code Status: Full Code   Attending Physician: Arturo Salmon MD   Primary Care Physician: Phil Tellez MD  Expected Discharge Date: 4/1/2024  Principal Problem:Left main coronary artery thrombosis    Subjective:     Hospital Course:   Cath on admission with severe LM disease prompting CTS evaluation. New onset A fib treated with metoprolol and AC started for [id number] 4. CTS with plans for CABG 3/28 LIMA-LAD, SVG-OM. TTE EF 60-65% and normal diastolic dysfunction.     Interval History: Mr. Wilkinson is doing well. He remains asymptomatic. Afib rate controlled on lopressor. TTE today.    Review of Systems   Constitutional: Negative for chills and diaphoresis.   Cardiovascular:  Positive for irregular heartbeat. Negative for chest pain and dyspnea on exertion.   Respiratory:  Negative for cough, shortness of breath and wheezing.    Skin:  Negative for dry skin.   Gastrointestinal:  Negative for abdominal pain, diarrhea, nausea and vomiting.   Genitourinary:  Negative for dysuria.   Neurological:  Negative for dizziness, headaches, light-headedness and weakness.   All other systems reviewed and are negative.    Objective:     Vital Signs (Most Recent):  Temp: 97.8 °F (36.6 °C) (03/25/24 0504)  Pulse: 85 (03/25/24 0745)  Resp: 17 (03/25/24 0504)  BP: 129/68 (03/25/24 0745)  SpO2: 99 % (03/25/24 0504) Vital Signs (24h Range):  Temp:  [97.1 °F (36.2 °C)-98 °F (36.7 °C)] 97.8 °F (36.6 °C)  Pulse:  [] 85  Resp:  [17-25] 17  SpO2:  [95 %-99 %] 99 %  BP: (106-141)/(62-84) 129/68     Weight: 71.4 kg (157 lb 6.5 oz)  Body mass index is 23.25 kg/m².     SpO2: 99 %         Intake/Output Summary (Last 24 hours) at 3/25/2024 0920  Last data filed at 3/25/2024 0710  Gross per 24 hour   Intake 266.87 ml   Output 800 ml   Net -533.13 ml        Lines/Drains/Airways       Peripheral Intravenous Line  Duration                  Peripheral IV - Single Lumen 03/22/24 1300 18 G Left Antecubital 2 days         Peripheral IV - Single Lumen 03/23/24 1430 18 G Anterior;Right Forearm 1 day                       Physical Exam  Vitals and nursing note reviewed.   Constitutional:       Appearance: Normal appearance.   Eyes:      Extraocular Movements: Extraocular movements intact.      Conjunctiva/sclera: Conjunctivae normal.   Cardiovascular:      Rate and Rhythm: Normal rate. Rhythm irregular.      Comments: No JVD  Pulmonary:      Effort: Pulmonary effort is normal.      Breath sounds: Normal breath sounds. No rales.   Abdominal:      General: Bowel sounds are normal.      Palpations: Abdomen is soft.   Musculoskeletal:      Cervical back: Normal range of motion.      Right lower leg: No edema.      Left lower leg: No edema.   Skin:     General: Skin is warm and dry.   Neurological:      General: No focal deficit present.      Mental Status: He is alert and oriented to person, place, and time.            Significant Labs: CMP   Recent Labs   Lab 03/24/24  0514 03/25/24  0501    139   K 3.9 3.8   * 107   CO2 21* 22*    90   BUN 18 21   CREATININE 0.9 1.1   CALCIUM 9.1 9.4   PROT 6.6 6.6   ALBUMIN 3.5 3.6   BILITOT 0.7 0.5   ALKPHOS 37* 35*   AST 17 16   ALT 19 18   ANIONGAP 6* 10    and CBC   Recent Labs   Lab 03/24/24  0346 03/25/24  0501   WBC 8.11 7.66   HGB 15.9 16.2   HCT 48.4 49.8    232       Significant Imaging:  All pertinent lab results from the last 24 hours have been reviewed.  Assessment and Plan:       * Left main coronary artery thrombosis  Interventional cardiology consulted with cath demonstrating LM disease. S/p ASA & Plavix load. Lipid panel 3 mo. prior WNL. CT chest with coronary atherosclerosis, pulmonary nodules (will need repeat imaging in the future), hepatic steatosis, mesenteric panniculitis. Carotid  ultrasound: no evidence significant carotid bifurcation stenosis. TTE with EF 60-65% with normal diastolic dysfunction.    - Planned surgical intervention 03/28/24 with CABG x2 (LIMA-LAD, SVG-OM) and MAZE to address his new afib. Tentative surgery date 3/28/24  - CTS ordered and f/u pre-op studies   - Continue Heparin gtt  - Hold Plavix. Continue ASA 81  - Holding ARB  - Atorvastatin 80 mg daily  - General cardiology follow-up on DC    Paroxysmal atrial fibrillation with rapid ventricular response  New onset; CHADSVASC 4 (age, HTN, CAD).     - Lopressor 25 mg BID; goal HR < 120 BPM  - Heparin gtt for anticoagulation can convert to DOAC afterwards  - Monitor on telemetry  - K>4, Mag>2  - EP referral on DC      Positive cardiac stress test  See LM    Benign essential hypertension  Continue Valsartan  BP goal < 130/80 mmHg        VTE Risk Mitigation (From admission, onward)           Ordered     heparin 25,000 units in dextrose 5% (100 units/ml) IV bolus from bag LOW INTENSITY nomogram - [facility]  As needed (PRN)        Question:  Heparin Infusion Adjustment (DO NOT MODIFY ANSWER)  Answer:  [email or web address]\epic\Images\Pharmacy\HeparinInfusions\heparin LOW INTENSITY nomogram for [facility] [id number].pdf    03/23/24 0818     heparin 25,000 units in dextrose 5% (100 units/ml) IV bolus from bag LOW INTENSITY nomogram - [facility]  As needed (PRN)        Question:  Heparin Infusion Adjustment (DO NOT MODIFY ANSWER)  Answer:  [email or web address]\epic\Images\Pharmacy\HeparinInfusions\heparin LOW INTENSITY nomogram for [facility] [id number].pdf    03/23/24 0818     heparin 25,000 units in dextrose 5% 250 mL (100 units/mL) infusion LOW INTENSITY nomogram - [facility]  Continuous        Question:  Begin at (units/kg/hr)  Answer:  12    03/23/24 0818     IP VTE HIGH RISK PATIENT  Once         03/22/24 1312     Place sequential compression device  Until discontinued         03/22/24 1312                    Soha Leonard MD  Cardiology  [facility]

## 2024-03-25 NOTE — ASSESSMENT & PLAN NOTE
New onset; CHADSVASC 4 (age, HTN, CAD).     - Lopressor 25 mg BID; goal HR < 120 BPM  - Heparin gtt for anticoagulation can convert to DOAC afterwards  - Monitor on telemetry  - K>4, Mag>2  - EP referral on DC

## 2024-03-26 ENCOUNTER — ANESTHESIA EVENT (OUTPATIENT)
Dept: SURGERY | Facility: HOSPITAL | Age: 74
DRG: 234 | End: 2024-03-26
Payer: MEDICARE

## 2024-03-26 LAB
ABO + RH BLD: NORMAL
ALBUMIN SERPL BCP-MCNC: 3.5 G/DL (ref 3.5–5.2)
ALP SERPL-CCNC: 35 U/L (ref 55–135)
ALT SERPL W/O P-5'-P-CCNC: 20 U/L (ref 10–44)
ANION GAP SERPL CALC-SCNC: 9 MMOL/L (ref 8–16)
APTT PPP: 43 SEC (ref 21–32)
AST SERPL-CCNC: 17 U/L (ref 10–40)
BASOPHILS # BLD AUTO: 0.04 K/UL (ref 0–0.2)
BASOPHILS NFR BLD: 0.5 % (ref 0–1.9)
BILIRUB SERPL-MCNC: 0.6 MG/DL (ref 0.1–1)
BLD GP AB SCN CELLS X3 SERPL QL: NORMAL
BUN SERPL-MCNC: 24 MG/DL (ref 8–23)
CALCIUM SERPL-MCNC: 9 MG/DL (ref 8.7–10.5)
CHLORIDE SERPL-SCNC: 109 MMOL/L (ref 95–110)
CO2 SERPL-SCNC: 21 MMOL/L (ref 23–29)
CREAT SERPL-MCNC: 0.9 MG/DL (ref 0.5–1.4)
DIFFERENTIAL METHOD BLD: NORMAL
EOSINOPHIL # BLD AUTO: 0.3 K/UL (ref 0–0.5)
EOSINOPHIL NFR BLD: 4.3 % (ref 0–8)
ERYTHROCYTE [DISTWIDTH] IN BLOOD BY AUTOMATED COUNT: 12.6 % (ref 11.5–14.5)
EST. GFR  (NO RACE VARIABLE): >60 ML/MIN/1.73 M^2
FIBRINOGEN PPP-MCNC: 368 MG/DL (ref 182–400)
GLUCOSE SERPL-MCNC: 88 MG/DL (ref 70–110)
HCT VFR BLD AUTO: 45.7 % (ref 40–54)
HGB BLD-MCNC: 15.2 G/DL (ref 14–18)
IMM GRANULOCYTES # BLD AUTO: 0.03 K/UL (ref 0–0.04)
IMM GRANULOCYTES NFR BLD AUTO: 0.4 % (ref 0–0.5)
INR PPP: 1 (ref 0.8–1.2)
LYMPHOCYTES # BLD AUTO: 1.9 K/UL (ref 1–4.8)
LYMPHOCYTES NFR BLD: 24.6 % (ref 18–48)
MAGNESIUM SERPL-MCNC: 2.1 MG/DL (ref 1.6–2.6)
MCH RBC QN AUTO: 29.9 PG (ref 27–31)
MCHC RBC AUTO-ENTMCNC: 33.3 G/DL (ref 32–36)
MCV RBC AUTO: 90 FL (ref 82–98)
MONOCYTES # BLD AUTO: 0.7 K/UL (ref 0.3–1)
MONOCYTES NFR BLD: 9.7 % (ref 4–15)
NEUTROPHILS # BLD AUTO: 4.5 K/UL (ref 1.8–7.7)
NEUTROPHILS NFR BLD: 60.5 % (ref 38–73)
NRBC BLD-RTO: 0 /100 WBC
PHOSPHATE SERPL-MCNC: 3.5 MG/DL (ref 2.7–4.5)
PLATELET # BLD AUTO: 207 K/UL (ref 150–450)
PMV BLD AUTO: 11 FL (ref 9.2–12.9)
POTASSIUM SERPL-SCNC: 4.1 MMOL/L (ref 3.5–5.1)
PROT SERPL-MCNC: 6 G/DL (ref 6–8.4)
PROTHROMBIN TIME: 10.9 SEC (ref 9–12.5)
RBC # BLD AUTO: 5.08 M/UL (ref 4.6–6.2)
SODIUM SERPL-SCNC: 139 MMOL/L (ref 136–145)
SPECIMEN OUTDATE: NORMAL
WBC # BLD AUTO: 7.51 K/UL (ref 3.9–12.7)

## 2024-03-26 PROCEDURE — 83735 ASSAY OF MAGNESIUM: CPT | Performed by: STUDENT IN AN ORGANIZED HEALTH CARE EDUCATION/TRAINING PROGRAM

## 2024-03-26 PROCEDURE — 86920 COMPATIBILITY TEST SPIN: CPT

## 2024-03-26 PROCEDURE — 80053 COMPREHEN METABOLIC PANEL: CPT | Performed by: STUDENT IN AN ORGANIZED HEALTH CARE EDUCATION/TRAINING PROGRAM

## 2024-03-26 PROCEDURE — 20600001 HC STEP DOWN PRIVATE ROOM

## 2024-03-26 PROCEDURE — 85025 COMPLETE CBC W/AUTO DIFF WBC: CPT | Performed by: STUDENT IN AN ORGANIZED HEALTH CARE EDUCATION/TRAINING PROGRAM

## 2024-03-26 PROCEDURE — 25000003 PHARM REV CODE 250: Performed by: STUDENT IN AN ORGANIZED HEALTH CARE EDUCATION/TRAINING PROGRAM

## 2024-03-26 PROCEDURE — 99231 SBSQ HOSP IP/OBS SF/LOW 25: CPT | Mod: GC,,, | Performed by: INTERNAL MEDICINE

## 2024-03-26 PROCEDURE — 85384 FIBRINOGEN ACTIVITY: CPT

## 2024-03-26 PROCEDURE — 63600175 PHARM REV CODE 636 W HCPCS: Performed by: STUDENT IN AN ORGANIZED HEALTH CARE EDUCATION/TRAINING PROGRAM

## 2024-03-26 PROCEDURE — 86850 RBC ANTIBODY SCREEN: CPT

## 2024-03-26 PROCEDURE — 36415 COLL VENOUS BLD VENIPUNCTURE: CPT | Mod: XB

## 2024-03-26 PROCEDURE — 36415 COLL VENOUS BLD VENIPUNCTURE: CPT | Performed by: STUDENT IN AN ORGANIZED HEALTH CARE EDUCATION/TRAINING PROGRAM

## 2024-03-26 PROCEDURE — 84100 ASSAY OF PHOSPHORUS: CPT | Performed by: STUDENT IN AN ORGANIZED HEALTH CARE EDUCATION/TRAINING PROGRAM

## 2024-03-26 PROCEDURE — 85610 PROTHROMBIN TIME: CPT

## 2024-03-26 PROCEDURE — 85730 THROMBOPLASTIN TIME PARTIAL: CPT | Performed by: STUDENT IN AN ORGANIZED HEALTH CARE EDUCATION/TRAINING PROGRAM

## 2024-03-26 RX ORDER — HYDROCODONE BITARTRATE AND ACETAMINOPHEN 500; 5 MG/1; MG/1
TABLET ORAL
Status: DISCONTINUED | OUTPATIENT
Start: 2024-03-26 | End: 2024-03-27

## 2024-03-26 RX ORDER — MUPIROCIN 20 MG/G
OINTMENT TOPICAL
Status: CANCELLED | OUTPATIENT
Start: 2024-03-26

## 2024-03-26 RX ADMIN — ATORVASTATIN CALCIUM 80 MG: 40 TABLET, FILM COATED ORAL at 08:03

## 2024-03-26 RX ADMIN — METOPROLOL TARTRATE 25 MG: 25 TABLET, FILM COATED ORAL at 08:03

## 2024-03-26 RX ADMIN — HEPARIN SODIUM AND DEXTROSE 12 UNITS/KG/HR: 10000; 5 INJECTION INTRAVENOUS at 05:03

## 2024-03-26 RX ADMIN — ASPIRIN 81 MG: 81 TABLET, COATED ORAL at 08:03

## 2024-03-26 NOTE — SUBJECTIVE & OBJECTIVE
Interval History: Mr. Wilkinson is doing well. Pending CABG 3/27    Review of Systems   Constitutional: Negative for chills and diaphoresis.   Cardiovascular:  Negative for chest pain and dyspnea on exertion.   Respiratory:  Negative for cough, shortness of breath and wheezing.    Gastrointestinal:  Negative for abdominal pain, diarrhea, nausea and vomiting.   Genitourinary:  Negative for dysuria.   Neurological:  Negative for dizziness, headaches, light-headedness and weakness.   All other systems reviewed and are negative.    Objective:     Vital Signs (Most Recent):  Temp: 97.8 °F (36.6 °C) (03/26/24 0750)  Pulse: 78 (03/26/24 0750)  Resp: 18 (03/26/24 0750)  BP: 139/78 (03/26/24 0750)  SpO2: 98 % (03/26/24 0750) Vital Signs (24h Range):  Temp:  [97.3 °F (36.3 °C)-98.1 °F (36.7 °C)] 97.8 °F (36.6 °C)  Pulse:  [71-95] 78  Resp:  [12-18] 18  SpO2:  [96 %-98 %] 98 %  BP: (131-149)/(69-78) 139/78     Weight: 71.2 kg (156 lb 15.5 oz)  Body mass index is 23.18 kg/m².     SpO2: 98 %         Intake/Output Summary (Last 24 hours) at 3/26/2024 0854  Last data filed at 3/26/2024 0607  Gross per 24 hour   Intake 1424 ml   Output 1300 ml   Net 124 ml       Lines/Drains/Airways       Peripheral Intravenous Line  Duration                  Peripheral IV - Single Lumen 03/22/24 1300 18 G Left Antecubital 3 days         Peripheral IV - Single Lumen 03/23/24 1430 18 G Anterior;Right Forearm 2 days                       Physical Exam  Vitals and nursing note reviewed.   Constitutional:       Appearance: Normal appearance.   Eyes:      Extraocular Movements: Extraocular movements intact.      Conjunctiva/sclera: Conjunctivae normal.   Cardiovascular:      Rate and Rhythm: Normal rate. Rhythm irregular.      Comments: No JVD  Pulmonary:      Effort: Pulmonary effort is normal.      Breath sounds: Normal breath sounds. No rales.   Abdominal:      General: Bowel sounds are normal.      Palpations: Abdomen is soft.   Musculoskeletal:       Cervical back: Normal range of motion.      Right lower leg: No edema.      Left lower leg: No edema.   Skin:     General: Skin is warm and dry.   Neurological:      General: No focal deficit present.      Mental Status: He is alert and oriented to person, place, and time.            Significant Labs: CMP   Recent Labs   Lab 03/25/24  0501 03/26/24  0325    139   K 3.8 4.1    109   CO2 22* 21*   GLU 90 88   BUN 21 24*   CREATININE 1.1 0.9   CALCIUM 9.4 9.0   PROT 6.6 6.0   ALBUMIN 3.6 3.5   BILITOT 0.5 0.6   ALKPHOS 35* 35*   AST 16 17   ALT 18 20   ANIONGAP 10 9    and CBC   Recent Labs   Lab 03/25/24  0501 03/26/24  0325   WBC 7.66 7.51   HGB 16.2 15.2   HCT 49.8 45.7    207       Significant Imaging:  All imaging reviewed last 24 hours.

## 2024-03-26 NOTE — PROGRESS NOTES
[facility]  Cardiology  Progress Note    Patient Name: Gerald Wilkinson  MRN: [id number]  Admission Date: 3/22/2024  Hospital Length of Stay: 4 days  Code Status: Full Code   Attending Physician: Arturo Salmon MD   Primary Care Physician: Phil Tellez MD  Expected Discharge Date: 4/1/2024  Principal Problem:Left main coronary artery thrombosis    Subjective:     Hospital Course:   Cath on admission with severe LM disease prompting CTS evaluation. New onset A fib treated with metoprolol and AC started for [id number] 4. CTS with plans for CABG 3/27 LIMA-LAD, SVG-OM. TTE EF 60-65% and normal diastolic dysfunction.     Interval History: Mr. Wilkinson is doing well. Pending CABG 3/27    Review of Systems   Constitutional: Negative for chills and diaphoresis.   Cardiovascular:  Negative for chest pain and dyspnea on exertion.   Respiratory:  Negative for cough, shortness of breath and wheezing.    Gastrointestinal:  Negative for abdominal pain, diarrhea, nausea and vomiting.   Genitourinary:  Negative for dysuria.   Neurological:  Negative for dizziness, headaches, light-headedness and weakness.   All other systems reviewed and are negative.    Objective:     Vital Signs (Most Recent):  Temp: 97.8 °F (36.6 °C) (03/26/24 0750)  Pulse: 78 (03/26/24 0750)  Resp: 18 (03/26/24 0750)  BP: 139/78 (03/26/24 0750)  SpO2: 98 % (03/26/24 0750) Vital Signs (24h Range):  Temp:  [97.3 °F (36.3 °C)-98.1 °F (36.7 °C)] 97.8 °F (36.6 °C)  Pulse:  [71-95] 78  Resp:  [12-18] 18  SpO2:  [96 %-98 %] 98 %  BP: (131-149)/(69-78) 139/78     Weight: 71.2 kg (156 lb 15.5 oz)  Body mass index is 23.18 kg/m².     SpO2: 98 %         Intake/Output Summary (Last 24 hours) at 3/26/2024 0837  Last data filed at 3/26/2024 0607  Gross per 24 hour   Intake 1424 ml   Output 1300 ml   Net 124 ml       Lines/Drains/Airways       Peripheral Intravenous Line  Duration                  Peripheral IV - Single Lumen 03/22/24 1300 18 G Left  Antecubital 3 days         Peripheral IV - Single Lumen 03/23/24 1430 18 G Anterior;Right Forearm 2 days                       Physical Exam  Vitals and nursing note reviewed.   Constitutional:       Appearance: Normal appearance.   Eyes:      Extraocular Movements: Extraocular movements intact.      Conjunctiva/sclera: Conjunctivae normal.   Cardiovascular:      Rate and Rhythm: Normal rate. Rhythm irregular.      Comments: No JVD  Pulmonary:      Effort: Pulmonary effort is normal.      Breath sounds: Normal breath sounds. No rales.   Abdominal:      General: Bowel sounds are normal.      Palpations: Abdomen is soft.   Musculoskeletal:      Cervical back: Normal range of motion.      Right lower leg: No edema.      Left lower leg: No edema.   Skin:     General: Skin is warm and dry.   Neurological:      General: No focal deficit present.      Mental Status: He is alert and oriented to person, place, and time.            Significant Labs: CMP   Recent Labs   Lab 03/25/24  0501 03/26/24  0325    139   K 3.8 4.1    109   CO2 22* 21*   GLU 90 88   BUN 21 24*   CREATININE 1.1 0.9   CALCIUM 9.4 9.0   PROT 6.6 6.0   ALBUMIN 3.6 3.5   BILITOT 0.5 0.6   ALKPHOS 35* 35*   AST 16 17   ALT 18 20   ANIONGAP 10 9    and CBC   Recent Labs   Lab 03/25/24  0501 03/26/24  0325   WBC 7.66 7.51   HGB 16.2 15.2   HCT 49.8 45.7    207       Significant Imaging:  All imaging reviewed last 24 hours.  Assessment and Plan:       * Left main coronary artery thrombosis  Interventional cardiology consulted with cath demonstrating LM disease. S/p ASA & Plavix load. Lipid panel 3 mo. prior WNL. CT chest with coronary atherosclerosis, pulmonary nodules (will need repeat imaging in the future), hepatic steatosis, mesenteric panniculitis. Carotid ultrasound: no evidence significant carotid bifurcation stenosis. TTE with EF 60-65% with normal diastolic dysfunction.    - NPO at midnight for planned surgical intervention 03/27/24  with CABG x2 (LIMA-LAD, SVG-OM) and MAZE to address his new afib.   - Continue Heparin gtt  - Hold Plavix. Continue ASA 81  - Holding ARB  - Atorvastatin 80 mg daily  - General cardiology follow-up on DC    Paroxysmal atrial fibrillation with rapid ventricular response  New onset; CHADSVASC 4 (age, HTN, CAD).     - Lopressor 25 mg BID; goal HR < 120 BPM  - Heparin gtt for anticoagulation can convert to DOAC afterwards  - Monitor on telemetry  - K>4, Mag>2  - EP referral on DC      Positive cardiac stress test  See LM    Benign essential hypertension  Continue Valsartan  BP goal < 130/80 mmHg        VTE Risk Mitigation (From admission, onward)           Ordered     heparin 25,000 units in dextrose 5% (100 units/ml) IV bolus from bag LOW INTENSITY nomogram - [facility]  As needed (PRN)        Question:  Heparin Infusion Adjustment (DO NOT MODIFY ANSWER)  Answer:  [email or web address]\epic\Images\Pharmacy\HeparinInfusions\heparin LOW INTENSITY nomogram for [facility] [id number]    03/23/24 0818     heparin 25,000 units in dextrose 5% (100 units/ml) IV bolus from bag LOW INTENSITY nomogram - [facility]  As needed (PRN)        Question:  Heparin Infusion Adjustment (DO NOT MODIFY ANSWER)  Answer:  [email or web address]\epic\Images\Pharmacy\HeparinInfusions\heparin LOW INTENSITY nomogram for [facility] [id number]    03/23/24 0818     heparin 25,000 units in dextrose 5% 250 mL (100 units/mL) infusion LOW INTENSITY nomogram - [facility]  Continuous        Question:  Begin at (units/kg/hr)  Answer:  12    03/23/24 0818     IP VTE HIGH RISK PATIENT  Once         03/22/24 1312     Place sequential compression device  Until discontinued         03/22/24 1312                    Soha Leonard MD  Cardiology  [facility]

## 2024-03-26 NOTE — ANESTHESIA PREPROCEDURE EVALUATION
[facility]  Anesthesia Pre-Operative Evaluation         Patient Name: Gerald Wilkinson  YOB: 1950  MRN: [id number]    SUBJECTIVE:     Pre-operative evaluation for Procedure(s) (LRB):  CORONARY ARTERY BYPASS GRAFT (CABG) (N/A)  HARVEST-VEIN-ENDOVASCULAR (N/A)     03/26/2024    Gerald Wilkinson is a 73 y.o. male w/ a significant PMHx of HTN and GERD, who presented to the ED after a poitive stress test. Been experiencing intermittent chest pain. Loaded with ASA and plavix upon arrival. C/b AF with RVR. Found to have severe LM disease and ostial RCA disese. CTS planning for CABG x2 (LIMA-LAD, SVG-OM) and MAZE to address his new afib. On heparin gtt. Rate controlled with lopressor.       Patient now presents for the above procedure(s).    TTE Summary 3/25/24:    Left Ventricle: The left ventricle is normal in size. Normal wall thickness. There is concentric remodeling. Normal wall motion. There is normal systolic function with a visually estimated ejection fraction of 60 - 65%. There is normal diastolic function.    Right Ventricle: Mild right ventricular enlargement. Wall thickness is normal. Right ventricle wall motion  is normal. Systolic function is normal.    Pulmonary Artery: The estimated pulmonary artery systolic pressure is 26 mmHg.    IVC/SVC: Normal venous pressure at 3 mmHg.    Stress Test Summary 3/22/24:    The ECG portion of the study is positive for ischemia.    The patient reported chest pain during the stress test.    There were no arrhythmias during stress.     Cardiac Catheterization 3/22/24:  Critical LM stenosis with multiple targets in a left dominant system.  Case discussed with CTS who plans for CABG.  Admission to CCU.    Prev airway: None documented.    LDA:        Peripheral IV - Single Lumen 03/23/24 1430 18 G Anterior;Right Forearm (Active)   Site Assessment Clean;Dry;Intact 03/26/24 1200   Extremity Assessment Distal to IV No abnormal discoloration 03/26/24 1200    Line Status Saline locked;Flushed 03/26/24 1200   Dressing Status Clean;Dry;Intact 03/26/24 1200   Dressing Intervention Integrity maintained 03/26/24 1200   Dressing Change Due 03/27/24 03/26/24 1200   Site Change Due 03/27/24 03/26/24 0800   Reason Not Rotated Not due 03/26/24 1200   Number of days: 2            Peripheral IV - Single Lumen 03/22/24 1300 18 G Left Antecubital (Active)   Site Assessment Clean;Dry;Intact 03/26/24 1200   Extremity Assessment Distal to IV No abnormal discoloration 03/26/24 1200   Line Status Infusing 03/26/24 1200   Dressing Status Clean;Dry;Intact 03/26/24 1200   Dressing Intervention Integrity maintained 03/26/24 1200   Dressing Change Due 03/26/24 03/26/24 1200   Site Change Due 03/26/24 03/26/24 1056   Reason Not Rotated Not due 03/26/24 1200   Number of days: 4       Drips:    heparin (porcine) in D5W 12 Units/kg/hr (03/26/24 0552)       Patient Active Problem List   Diagnosis    Benign essential hypertension    Gastroesophageal reflux disease without esophagitis    Primary insomnia    Erectile dysfunction    Loose stools    Borderline hyperlipidemia    Prostate cancer screening    Encounter for hepatitis C screening test for low risk patient    Chronic right shoulder pain    Chest pain    Positive cardiac stress test    Paroxysmal atrial fibrillation with rapid ventricular response    Left main coronary artery thrombosis       Review of patient's allergies indicates:  No Known Allergies    Current Inpatient Medications:   aspirin  81 mg Oral Daily    atorvastatin  80 mg Oral Daily    metoprolol tartrate  25 mg Oral BID    multivitamin  1 tablet Oral Daily    polyethylene glycol  17 g Oral Daily       No current facility-administered medications on file prior to encounter.     Current Outpatient Medications on File Prior to Encounter   Medication Sig Dispense Refill    valsartan (DIOVAN) 160 MG tablet Take 1 tablet (160 mg total) by mouth once daily. 90 tablet 3    multivitamin  capsule Take 1 capsule by mouth once daily.         Past Surgical History:   Procedure Laterality Date    CORONARY ANGIOGRAPHY N/A 3/22/2024    Procedure: ANGIOGRAM, CORONARY ARTERY;  Surgeon: Yumi Bal MD;  Location: [facility] CATH LAB;  Service: Cardiology;  Laterality: N/A;    LEFT HEART CATHETERIZATION Left 3/22/2024    Procedure: Left heart cath;  Surgeon: Yumi Bal MD;  Location: [facility] CATH LAB;  Service: Cardiology;  Laterality: Left;       Social History:  Tobacco Use: Low Risk  (3/25/2024)    Patient History     Smoking Tobacco Use: Never     Smokeless Tobacco Use: Never     Passive Exposure: Not on file      Alcohol Use: Not on file        OBJECTIVE:     Vital Signs Range (Last 24H):  Temp:  [36.3 °C (97.3 °F)-36.7 °C (98.1 °F)]   Pulse:  [67-87]   Resp:  [16-18]   BP: (131-149)/(69-78)   SpO2:  [96 %-98 %]       Significant Labs:  Lab Results   Component Value Date    WBC 7.51 03/26/2024    HGB 15.2 03/26/2024    HCT 45.7 03/26/2024     03/26/2024    CHOL 169 04/25/2023    TRIG 43 04/25/2023    HDL 48 04/25/2023    ALT 20 03/26/2024    AST 17 03/26/2024     03/26/2024    K 4.1 03/26/2024     03/26/2024    CREATININE 0.9 03/26/2024    BUN 24 (H) 03/26/2024    CO2 21 (L) 03/26/2024    TSH 1.192 09/29/2020    PSA 3.5 04/25/2023    INR 1.0 03/26/2024       Diagnostic Studies: No relevant studies.    EKG:   Results for orders placed or performed during the hospital encounter of 03/22/24   EKG 12-lead    Collection Time: 03/23/24  7:28 AM   Result Value Ref Range    QRS Duration 122 ms    [facility] QTC Calculation 432 ms    Narrative    Test Reason : R07.9,    Vent. Rate : 130 BPM     Atrial Rate : 159 BPM     P-R Int : 000 ms          QRS Dur : 122 ms      QT Int : 294 ms       P-R-T Axes : 000 054 038 degrees     QTc Int : 432 ms    Atrial fibrillation with rapid ventricular response with premature  ventricular or aberrantly conducted complexes  Right bundle branch block  Abnormal ECG  When  "compared with ECG of 23-MAR-2024 07:22,  The axis Shifted left  Confirmed by Carlos Manuel STODDARD MD ([id number]) on 3/23/2024 9:40:17 AM    Referred By: [email or web address]   SELF           Confirmed By:Carlos Manuel STODDADR MD       2D ECHO:  TTE:  Results for orders placed or performed during the hospital encounter of 03/22/24   Echo   Result Value Ref Range    RA Width 3.81 cm    LA volume (mod) 37.03 cm3    Left Atrium Major Axis 3.96 cm    Left Atrium Minor Axis 4.33 cm    RA Major Axis 4.35 cm    LV Diastolic Volume 77.97 mL    LV Systolic Volume 29.40 mL    PV Peak D Yoni 0.33 m/s    PV Peak S Yoni 0.68 m/s    MV Peak A Yoni 0.64 m/s    MV stenosis pressure 1/2 time 76.99 ms    TR Max Yoni 2.42 m/s    MV Peak E Yoni 0.58 m/s    Ao VTI 22.06 cm    Ao peak yoni 1.27 m/s    LVOT peak VTI 17.90 cm    LVOT peak yoni 0.80 m/s    LVOT diameter 2.06 cm    MV "A" wave duration 4.57 msec    E wave deceleration time 265.50 msec    AV mean gradient 4 mmHg    TAPSE 1.81 cm    RVDD 4.20 cm    LA size 3.00 cm    Ascending aorta 3.42 cm    STJ 3.26 cm    Sinus 3.46 cm    LVIDs 2.79 2.1 - 4.0 cm    Posterior Wall 0.91 0.6 - 1.1 cm    IVS 0.70 0.6 - 1.1 cm    LVIDd 4.19 3.5 - 6.0 cm    TDI LATERAL 0.09 m/s    LA WIDTH 3.44 cm    TDI SEPTAL 0.05 m/s    LV LATERAL E/E' RATIO 6.44 m/s    LV SEPTAL E/E' RATIO 11.60 m/s    FS 33 28 - 44 %    LA volume 36.29 cm3    LV mass 101.73 g    ZLVIDD -2.18     ZLVIDS -1.12     Left Ventricle Relative Wall Thickness 0.43 cm    AV valve area 2.70 cm²    AV Velocity Ratio 0.63     AV index (prosthetic) 0.81     MV valve area p 1/2 method 2.86 cm2    E/A ratio 0.91     Mean e' 0.07 m/s    Pulm vein S/D ratio 2.06     LVOT area 3.3 cm2    LVOT stroke volume 59.63 cm3    AV peak gradient 6 mmHg    E/E' ratio 8.29 m/s    LV Systolic Volume Index 15.7 mL/m2    LV Diastolic Volume Index 41.70 mL/m2    LA Volume Index 19.4 mL/m2    LV Mass Index 54 g/m2    Triscuspid Valve Regurgitation Peak Gradient 23 mmHg    LA Volume Index (Mod) 19.8 mL/m2 "    JUAN CARLOS by Velocity Ratio 2.10 cm²    BSA 1.86 m2    TV resting pulmonary artery pressure 26 mmHg    RV TB RVSP 5 mmHg    Est. RA pres 3 mmHg    Narrative      Left Ventricle: The left ventricle is normal in size. Normal wall   thickness. There is concentric remodeling. Normal wall motion. There is   normal systolic function with a visually estimated ejection fraction of 60   - 65%. There is normal diastolic function.    Right Ventricle: Mild right ventricular enlargement. Wall thickness is   normal. Right ventricle wall motion  is normal. Systolic function is   normal.    Pulmonary Artery: The estimated pulmonary artery systolic pressure is   26 mmHg.    IVC/SVC: Normal venous pressure at 3 mmHg.         MARIA C:  No results found for this or any previous visit.    ASSESSMENT/PLAN:       ASSESSMENT/PLAN:       Pre-op Assessment    I have reviewed the Patient Summary Reports.     I have reviewed the Nursing Notes. I have reviewed the NPO Status.   I have reviewed the Medications.     Review of Systems  Anesthesia Hx:             Denies Family Hx of Anesthesia complications.    Denies Personal Hx of Anesthesia complications.                    Cardiovascular:     Hypertension   CAD                                        Hepatic/GI:     GERD                 Physical Exam  General: Cooperative, Alert and Oriented    Airway:  Mallampati: / II  TM Distance: Normal  Tongue: Normal  Neck ROM: Normal ROM    Dental:  Intact        Anesthesia Plan  Type of Anesthesia, risks & benefits discussed:    Anesthesia Type: Gen ETT  Intra-op Monitoring Plan: Standard ASA Monitors, Art Line, Central Line, MARIA C, PA and CO  Post Op Pain Control Plan: multimodal analgesia and IV/PO Opioids PRN  Induction:  IV  Airway Plan: Direct and Video  Informed Consent: Informed consent signed with the Patient and all parties understand the risks and agree with anesthesia plan.  All questions answered. Patient consented to blood products? Yes  ASA Score:  4  Day of Surgery Review of History & Physical: H&P Update referred to the surgeon/provider.    Ready For Surgery From Anesthesia Perspective.     .

## 2024-03-26 NOTE — ASSESSMENT & PLAN NOTE
Interventional cardiology consulted with cath demonstrating LM disease. S/p ASA & Plavix load. Lipid panel 3 mo. prior WNL. CT chest with coronary atherosclerosis, pulmonary nodules (will need repeat imaging in the future), hepatic steatosis, mesenteric panniculitis. Carotid ultrasound: no evidence significant carotid bifurcation stenosis. TTE with EF 60-65% with normal diastolic dysfunction.    - NPO at midnight for planned surgical intervention 03/27/24 with CABG x2 (LIMA-LAD, SVG-OM) and MAZE to address his new afib.   - Continue Heparin gtt  - Hold Plavix. Continue ASA 81  - Holding ARB  - Atorvastatin 80 mg daily  - General cardiology follow-up on DC

## 2024-03-26 NOTE — PLAN OF CARE
VSS. Continue heparin  gtt 12 units/kg/hr. Pt ambulated in tovar during the day. Pt educated on fall risk and remained free from falls/trauma/injury. Denies chest pain, SOB, palpitations, dizziness, pain, or discomfort. Plan of care reviewed with pt, all questions answered. Bed locked in lowest position, call bell within reach, no acute distress noted, will continue to monitor.

## 2024-03-27 ENCOUNTER — ANESTHESIA (OUTPATIENT)
Dept: SURGERY | Facility: HOSPITAL | Age: 74
DRG: 234 | End: 2024-03-27
Payer: MEDICARE

## 2024-03-27 DIAGNOSIS — Z95.1 S/P CABG (CORONARY ARTERY BYPASS GRAFT): Primary | ICD-10-CM

## 2024-03-27 PROBLEM — R73.9 STRESS HYPERGLYCEMIA: Status: ACTIVE | Noted: 2024-03-27

## 2024-03-27 LAB
ALBUMIN SERPL BCP-MCNC: 2.9 G/DL (ref 3.5–5.2)
ALBUMIN SERPL BCP-MCNC: 4 G/DL (ref 3.5–5.2)
ALLENS TEST: ABNORMAL
ALLENS TEST: NORMAL
ALP SERPL-CCNC: 38 U/L (ref 55–135)
ALP SERPL-CCNC: 42 U/L (ref 55–135)
ALT SERPL W/O P-5'-P-CCNC: 39 U/L (ref 10–44)
ALT SERPL W/O P-5'-P-CCNC: 45 U/L (ref 10–44)
ANION GAP SERPL CALC-SCNC: 10 MMOL/L (ref 8–16)
ANION GAP SERPL CALC-SCNC: 9 MMOL/L (ref 8–16)
APTT PPP: 27.3 SEC (ref 21–32)
APTT PPP: 27.6 SEC (ref 21–32)
APTT PPP: 29 SEC (ref 21–32)
APTT PPP: 44.1 SEC (ref 21–32)
AST SERPL-CCNC: 38 U/L (ref 10–40)
AST SERPL-CCNC: 45 U/L (ref 10–40)
BASOPHILS # BLD AUTO: 0.01 K/UL (ref 0–0.2)
BASOPHILS # BLD AUTO: 0.02 K/UL (ref 0–0.2)
BASOPHILS # BLD AUTO: 0.04 K/UL (ref 0–0.2)
BASOPHILS # BLD AUTO: 0.05 K/UL (ref 0–0.2)
BASOPHILS # BLD AUTO: 0.06 K/UL (ref 0–0.2)
BASOPHILS NFR BLD: 0.1 % (ref 0–1.9)
BASOPHILS NFR BLD: 0.1 % (ref 0–1.9)
BASOPHILS NFR BLD: 0.3 % (ref 0–1.9)
BASOPHILS NFR BLD: 0.4 % (ref 0–1.9)
BASOPHILS NFR BLD: 0.7 % (ref 0–1.9)
BILIRUB SERPL-MCNC: 0.7 MG/DL (ref 0.1–1)
BILIRUB SERPL-MCNC: 0.8 MG/DL (ref 0.1–1)
BLD PROD TYP BPU: NORMAL
BLOOD UNIT EXPIRATION DATE: NORMAL
BLOOD UNIT TYPE CODE: 6200
BLOOD UNIT TYPE CODE: 6200
BLOOD UNIT TYPE CODE: 8400
BLOOD UNIT TYPE: NORMAL
BUN SERPL-MCNC: 18 MG/DL (ref 8–23)
BUN SERPL-MCNC: 21 MG/DL (ref 8–23)
CA-I BLDV-SCNC: 1.06 MMOL/L (ref 1.06–1.42)
CALCIUM SERPL-MCNC: 8.1 MG/DL (ref 8.7–10.5)
CALCIUM SERPL-MCNC: 9.6 MG/DL (ref 8.7–10.5)
CHLORIDE SERPL-SCNC: 106 MMOL/L (ref 95–110)
CHLORIDE SERPL-SCNC: 106 MMOL/L (ref 95–110)
CO2 SERPL-SCNC: 22 MMOL/L (ref 23–29)
CO2 SERPL-SCNC: 23 MMOL/L (ref 23–29)
CODING SYSTEM: NORMAL
CREAT SERPL-MCNC: 1 MG/DL (ref 0.5–1.4)
CREAT SERPL-MCNC: 1 MG/DL (ref 0.5–1.4)
CROSSMATCH INTERPRETATION: NORMAL
DELSYS: ABNORMAL
DELSYS: NORMAL
DIFFERENTIAL METHOD BLD: ABNORMAL
DIFFERENTIAL METHOD BLD: NORMAL
DISPENSE STATUS: NORMAL
EOSINOPHIL # BLD AUTO: 0 K/UL (ref 0–0.5)
EOSINOPHIL # BLD AUTO: 0 K/UL (ref 0–0.5)
EOSINOPHIL # BLD AUTO: 0.1 K/UL (ref 0–0.5)
EOSINOPHIL # BLD AUTO: 0.2 K/UL (ref 0–0.5)
EOSINOPHIL # BLD AUTO: 0.3 K/UL (ref 0–0.5)
EOSINOPHIL NFR BLD: 0 % (ref 0–8)
EOSINOPHIL NFR BLD: 0 % (ref 0–8)
EOSINOPHIL NFR BLD: 0.5 % (ref 0–8)
EOSINOPHIL NFR BLD: 1.3 % (ref 0–8)
EOSINOPHIL NFR BLD: 3.7 % (ref 0–8)
ERYTHROCYTE [DISTWIDTH] IN BLOOD BY AUTOMATED COUNT: 12.3 % (ref 11.5–14.5)
ERYTHROCYTE [DISTWIDTH] IN BLOOD BY AUTOMATED COUNT: 12.3 % (ref 11.5–14.5)
ERYTHROCYTE [DISTWIDTH] IN BLOOD BY AUTOMATED COUNT: 12.4 % (ref 11.5–14.5)
ERYTHROCYTE [DISTWIDTH] IN BLOOD BY AUTOMATED COUNT: 12.6 % (ref 11.5–14.5)
ERYTHROCYTE [DISTWIDTH] IN BLOOD BY AUTOMATED COUNT: 12.6 % (ref 11.5–14.5)
EST. GFR  (NO RACE VARIABLE): >60 ML/MIN/1.73 M^2
EST. GFR  (NO RACE VARIABLE): >60 ML/MIN/1.73 M^2
FIBRINOGEN PPP-MCNC: 212 MG/DL (ref 182–400)
FIBRINOGEN PPP-MCNC: 256 MG/DL (ref 182–400)
FIO2: 21
FIO2: 21
FLOW: 6
FLOW: 6
GLUCOSE SERPL-MCNC: 110 MG/DL (ref 70–110)
GLUCOSE SERPL-MCNC: 111 MG/DL (ref 70–110)
GLUCOSE SERPL-MCNC: 138 MG/DL (ref 70–110)
GLUCOSE SERPL-MCNC: 140 MG/DL (ref 70–110)
GLUCOSE SERPL-MCNC: 152 MG/DL (ref 70–110)
GLUCOSE SERPL-MCNC: 163 MG/DL (ref 70–110)
GLUCOSE SERPL-MCNC: 87 MG/DL (ref 70–110)
HCO3 UR-SCNC: 20.6 MMOL/L (ref 24–28)
HCO3 UR-SCNC: 21.3 MMOL/L (ref 24–28)
HCO3 UR-SCNC: 21.3 MMOL/L (ref 24–28)
HCO3 UR-SCNC: 22.3 MMOL/L (ref 24–28)
HCO3 UR-SCNC: 22.5 MMOL/L (ref 24–28)
HCO3 UR-SCNC: 22.6 MMOL/L (ref 24–28)
HCO3 UR-SCNC: 22.7 MMOL/L (ref 24–28)
HCO3 UR-SCNC: 23 MMOL/L (ref 24–28)
HCO3 UR-SCNC: 24 MMOL/L (ref 24–28)
HCT VFR BLD AUTO: 26.7 % (ref 40–54)
HCT VFR BLD AUTO: 30 % (ref 40–54)
HCT VFR BLD AUTO: 34.2 % (ref 40–54)
HCT VFR BLD AUTO: 35.9 % (ref 40–54)
HCT VFR BLD AUTO: 50.6 % (ref 40–54)
HCT VFR BLD CALC: 26 %PCV (ref 36–54)
HCT VFR BLD CALC: 26 %PCV (ref 36–54)
HCT VFR BLD CALC: 29 %PCV (ref 36–54)
HCT VFR BLD CALC: 31 %PCV (ref 36–54)
HCT VFR BLD CALC: 33 %PCV (ref 36–54)
HCT VFR BLD CALC: 34 %PCV (ref 36–54)
HCT VFR BLD CALC: 42 %PCV (ref 36–54)
HGB BLD-MCNC: 11.5 G/DL (ref 14–18)
HGB BLD-MCNC: 12 G/DL (ref 14–18)
HGB BLD-MCNC: 16.4 G/DL (ref 14–18)
HGB BLD-MCNC: 8.9 G/DL (ref 14–18)
HGB BLD-MCNC: 9.9 G/DL (ref 14–18)
IMM GRANULOCYTES # BLD AUTO: 0.03 K/UL (ref 0–0.04)
IMM GRANULOCYTES # BLD AUTO: 0.04 K/UL (ref 0–0.04)
IMM GRANULOCYTES # BLD AUTO: 0.07 K/UL (ref 0–0.04)
IMM GRANULOCYTES # BLD AUTO: 0.12 K/UL (ref 0–0.04)
IMM GRANULOCYTES # BLD AUTO: 0.14 K/UL (ref 0–0.04)
IMM GRANULOCYTES NFR BLD AUTO: 0.3 % (ref 0–0.5)
IMM GRANULOCYTES NFR BLD AUTO: 0.4 % (ref 0–0.5)
IMM GRANULOCYTES NFR BLD AUTO: 0.5 % (ref 0–0.5)
IMM GRANULOCYTES NFR BLD AUTO: 0.8 % (ref 0–0.5)
IMM GRANULOCYTES NFR BLD AUTO: 0.9 % (ref 0–0.5)
INR PPP: 1.1 (ref 0.8–1.2)
INR PPP: 1.1 (ref 0.8–1.2)
INR PPP: 1.2 (ref 0.8–1.2)
LDH SERPL L TO P-CCNC: 0.52 MMOL/L (ref 0.36–1.25)
LDH SERPL L TO P-CCNC: 0.93 MMOL/L (ref 0.36–1.25)
LDH SERPL L TO P-CCNC: 0.96 MMOL/L (ref 0.36–1.25)
LDH SERPL L TO P-CCNC: 1 MMOL/L (ref 0.36–1.25)
LDH SERPL L TO P-CCNC: 1.22 MMOL/L (ref 0.36–1.25)
LDH SERPL L TO P-CCNC: 1.38 MMOL/L (ref 0.36–1.25)
LYMPHOCYTES # BLD AUTO: 0.3 K/UL (ref 1–4.8)
LYMPHOCYTES # BLD AUTO: 0.6 K/UL (ref 1–4.8)
LYMPHOCYTES # BLD AUTO: 0.9 K/UL (ref 1–4.8)
LYMPHOCYTES # BLD AUTO: 0.9 K/UL (ref 1–4.8)
LYMPHOCYTES # BLD AUTO: 2.1 K/UL (ref 1–4.8)
LYMPHOCYTES NFR BLD: 2.7 % (ref 18–48)
LYMPHOCYTES NFR BLD: 27.4 % (ref 18–48)
LYMPHOCYTES NFR BLD: 3.6 % (ref 18–48)
LYMPHOCYTES NFR BLD: 5.5 % (ref 18–48)
LYMPHOCYTES NFR BLD: 6.1 % (ref 18–48)
MAGNESIUM SERPL-MCNC: 2.2 MG/DL (ref 1.6–2.6)
MAGNESIUM SERPL-MCNC: 2.3 MG/DL (ref 1.6–2.6)
MAGNESIUM SERPL-MCNC: 2.7 MG/DL (ref 1.6–2.6)
MCH RBC QN AUTO: 29.8 PG (ref 27–31)
MCH RBC QN AUTO: 29.8 PG (ref 27–31)
MCH RBC QN AUTO: 30.2 PG (ref 27–31)
MCH RBC QN AUTO: 30.3 PG (ref 27–31)
MCH RBC QN AUTO: 30.3 PG (ref 27–31)
MCHC RBC AUTO-ENTMCNC: 32.4 G/DL (ref 32–36)
MCHC RBC AUTO-ENTMCNC: 33 G/DL (ref 32–36)
MCHC RBC AUTO-ENTMCNC: 33.3 G/DL (ref 32–36)
MCHC RBC AUTO-ENTMCNC: 33.4 G/DL (ref 32–36)
MCHC RBC AUTO-ENTMCNC: 33.6 G/DL (ref 32–36)
MCV RBC AUTO: 89 FL (ref 82–98)
MCV RBC AUTO: 90 FL (ref 82–98)
MCV RBC AUTO: 90 FL (ref 82–98)
MCV RBC AUTO: 92 FL (ref 82–98)
MCV RBC AUTO: 92 FL (ref 82–98)
MODE: ABNORMAL
MODE: NORMAL
MONOCYTES # BLD AUTO: 0.6 K/UL (ref 0.3–1)
MONOCYTES # BLD AUTO: 0.7 K/UL (ref 0.3–1)
MONOCYTES # BLD AUTO: 0.8 K/UL (ref 0.3–1)
MONOCYTES # BLD AUTO: 0.8 K/UL (ref 0.3–1)
MONOCYTES # BLD AUTO: 1.3 K/UL (ref 0.3–1)
MONOCYTES NFR BLD: 4.1 % (ref 4–15)
MONOCYTES NFR BLD: 5.3 % (ref 4–15)
MONOCYTES NFR BLD: 6.6 % (ref 4–15)
MONOCYTES NFR BLD: 8.2 % (ref 4–15)
MONOCYTES NFR BLD: 9.6 % (ref 4–15)
NEUTROPHILS # BLD AUTO: 10.9 K/UL (ref 1.8–7.7)
NEUTROPHILS # BLD AUTO: 12.6 K/UL (ref 1.8–7.7)
NEUTROPHILS # BLD AUTO: 13.5 K/UL (ref 1.8–7.7)
NEUTROPHILS # BLD AUTO: 13.6 K/UL (ref 1.8–7.7)
NEUTROPHILS # BLD AUTO: 4.3 K/UL (ref 1.8–7.7)
NEUTROPHILS NFR BLD: 58.2 % (ref 38–73)
NEUTROPHILS NFR BLD: 87.4 % (ref 38–73)
NEUTROPHILS NFR BLD: 87.4 % (ref 38–73)
NEUTROPHILS NFR BLD: 87.6 % (ref 38–73)
NEUTROPHILS NFR BLD: 90.3 % (ref 38–73)
NRBC BLD-RTO: 0 /100 WBC
OHS QRS DURATION: 128 MS
OHS QTC CALCULATION: 497 MS
PCO2 BLDA: 35.7 MMHG (ref 35–45)
PCO2 BLDA: 35.9 MMHG (ref 35–45)
PCO2 BLDA: 36.1 MMHG (ref 35–45)
PCO2 BLDA: 36.2 MMHG (ref 35–45)
PCO2 BLDA: 36.4 MMHG (ref 35–45)
PCO2 BLDA: 36.9 MMHG (ref 35–45)
PCO2 BLDA: 38.3 MMHG (ref 35–45)
PCO2 BLDA: 38.4 MMHG (ref 35–45)
PCO2 BLDA: 43.9 MMHG (ref 35–45)
PH SMN: 7.34 [PH] (ref 7.35–7.45)
PH SMN: 7.36 [PH] (ref 7.35–7.45)
PH SMN: 7.37 [PH] (ref 7.35–7.45)
PH SMN: 7.38 [PH] (ref 7.35–7.45)
PH SMN: 7.39 [PH] (ref 7.35–7.45)
PH SMN: 7.4 [PH] (ref 7.35–7.45)
PH SMN: 7.41 [PH] (ref 7.35–7.45)
PHOSPHATE SERPL-MCNC: 2.7 MG/DL (ref 2.7–4.5)
PHOSPHATE SERPL-MCNC: 3 MG/DL (ref 2.7–4.5)
PHOSPHATE SERPL-MCNC: 3.8 MG/DL (ref 2.7–4.5)
PLATELET # BLD AUTO: 134 K/UL (ref 150–450)
PLATELET # BLD AUTO: 151 K/UL (ref 150–450)
PLATELET # BLD AUTO: 173 K/UL (ref 150–450)
PLATELET # BLD AUTO: 204 K/UL (ref 150–450)
PLATELET # BLD AUTO: 245 K/UL (ref 150–450)
PMV BLD AUTO: 10.7 FL (ref 9.2–12.9)
PMV BLD AUTO: 10.8 FL (ref 9.2–12.9)
PMV BLD AUTO: 11.1 FL (ref 9.2–12.9)
PO2 BLDA: 137 MMHG (ref 80–100)
PO2 BLDA: 186 MMHG (ref 80–100)
PO2 BLDA: 189 MMHG (ref 80–100)
PO2 BLDA: 365 MMHG (ref 80–100)
PO2 BLDA: 491 MMHG (ref 80–100)
PO2 BLDA: 573 MMHG (ref 80–100)
PO2 BLDA: 74 MMHG (ref 80–100)
PO2 BLDA: 74 MMHG (ref 80–100)
PO2 BLDA: 85 MMHG (ref 80–100)
POC BE: -2 MMOL/L
POC BE: -3 MMOL/L
POC BE: -4 MMOL/L
POC BE: -4 MMOL/L
POC BE: -5 MMOL/L
POC IONIZED CALCIUM: 0.93 MMOL/L (ref 1.06–1.42)
POC IONIZED CALCIUM: 1.03 MMOL/L (ref 1.06–1.42)
POC IONIZED CALCIUM: 1.04 MMOL/L (ref 1.06–1.42)
POC IONIZED CALCIUM: 1.08 MMOL/L (ref 1.06–1.42)
POC IONIZED CALCIUM: 1.09 MMOL/L (ref 1.06–1.42)
POC IONIZED CALCIUM: 1.11 MMOL/L (ref 1.06–1.42)
POC IONIZED CALCIUM: 1.12 MMOL/L (ref 1.06–1.42)
POC IONIZED CALCIUM: 1.17 MMOL/L (ref 1.06–1.42)
POC IONIZED CALCIUM: 1.27 MMOL/L (ref 1.06–1.42)
POC SATURATED O2: 100 % (ref 95–100)
POC SATURATED O2: 94 % (ref 95–100)
POC SATURATED O2: 95 % (ref 95–100)
POC SATURATED O2: 96 % (ref 95–100)
POC SATURATED O2: 99 % (ref 95–100)
POC TCO2: 22 MMOL/L (ref 23–27)
POC TCO2: 23 MMOL/L (ref 23–27)
POC TCO2: 24 MMOL/L (ref 23–27)
POC TCO2: 25 MMOL/L (ref 23–27)
POCT GLUCOSE: 115 MG/DL (ref 70–110)
POCT GLUCOSE: 116 MG/DL (ref 70–110)
POCT GLUCOSE: 116 MG/DL (ref 70–110)
POCT GLUCOSE: 118 MG/DL (ref 70–110)
POCT GLUCOSE: 119 MG/DL (ref 70–110)
POCT GLUCOSE: 122 MG/DL (ref 70–110)
POCT GLUCOSE: 128 MG/DL (ref 70–110)
POCT GLUCOSE: 135 MG/DL (ref 70–110)
POCT GLUCOSE: 143 MG/DL (ref 70–110)
POCT GLUCOSE: 146 MG/DL (ref 70–110)
POCT GLUCOSE: 148 MG/DL (ref 70–110)
POTASSIUM BLD-SCNC: 3.6 MMOL/L (ref 3.5–5.1)
POTASSIUM BLD-SCNC: 3.8 MMOL/L (ref 3.5–5.1)
POTASSIUM BLD-SCNC: 4.1 MMOL/L (ref 3.5–5.1)
POTASSIUM BLD-SCNC: 4.2 MMOL/L (ref 3.5–5.1)
POTASSIUM BLD-SCNC: 4.4 MMOL/L (ref 3.5–5.1)
POTASSIUM BLD-SCNC: 4.7 MMOL/L (ref 3.5–5.1)
POTASSIUM BLD-SCNC: 4.7 MMOL/L (ref 3.5–5.1)
POTASSIUM BLD-SCNC: 5 MMOL/L (ref 3.5–5.1)
POTASSIUM BLD-SCNC: 5.2 MMOL/L (ref 3.5–5.1)
POTASSIUM SERPL-SCNC: 3.8 MMOL/L (ref 3.5–5.1)
POTASSIUM SERPL-SCNC: 4.5 MMOL/L (ref 3.5–5.1)
PROT SERPL-MCNC: 5.2 G/DL (ref 6–8.4)
PROT SERPL-MCNC: 7.4 G/DL (ref 6–8.4)
PROTHROMBIN TIME: 11.5 SEC (ref 9–12.5)
PROTHROMBIN TIME: 11.7 SEC (ref 9–12.5)
PROTHROMBIN TIME: 13 SEC (ref 9–12.5)
RBC # BLD AUTO: 2.99 M/UL (ref 4.6–6.2)
RBC # BLD AUTO: 3.27 M/UL (ref 4.6–6.2)
RBC # BLD AUTO: 3.8 M/UL (ref 4.6–6.2)
RBC # BLD AUTO: 3.98 M/UL (ref 4.6–6.2)
RBC # BLD AUTO: 5.51 M/UL (ref 4.6–6.2)
SAMPLE: ABNORMAL
SAMPLE: NORMAL
SITE: ABNORMAL
SITE: NORMAL
SODIUM BLD-SCNC: 136 MMOL/L (ref 136–145)
SODIUM BLD-SCNC: 136 MMOL/L (ref 136–145)
SODIUM BLD-SCNC: 137 MMOL/L (ref 136–145)
SODIUM BLD-SCNC: 138 MMOL/L (ref 136–145)
SODIUM BLD-SCNC: 140 MMOL/L (ref 136–145)
SODIUM BLD-SCNC: 141 MMOL/L (ref 136–145)
SODIUM BLD-SCNC: 142 MMOL/L (ref 136–145)
SODIUM SERPL-SCNC: 138 MMOL/L (ref 136–145)
SODIUM SERPL-SCNC: 138 MMOL/L (ref 136–145)
SP02: 96
SP02: 96
SP02: 97
SP02: 97
SP02: 99
SP02: 99
UNIT NUMBER: NORMAL
WBC # BLD AUTO: 12.06 K/UL (ref 3.9–12.7)
WBC # BLD AUTO: 14.37 K/UL (ref 3.9–12.7)
WBC # BLD AUTO: 15.39 K/UL (ref 3.9–12.7)
WBC # BLD AUTO: 15.52 K/UL (ref 3.9–12.7)
WBC # BLD AUTO: 7.47 K/UL (ref 3.9–12.7)

## 2024-03-27 PROCEDURE — 93041 RHYTHM ECG TRACING: CPT

## 2024-03-27 PROCEDURE — C1729 CATH, DRAINAGE: HCPCS | Performed by: THORACIC SURGERY (CARDIOTHORACIC VASCULAR SURGERY)

## 2024-03-27 PROCEDURE — 85520 HEPARIN ASSAY: CPT

## 2024-03-27 PROCEDURE — 021009W BYPASS CORONARY ARTERY, ONE ARTERY FROM AORTA WITH AUTOLOGOUS VENOUS TISSUE, OPEN APPROACH: ICD-10-PCS | Performed by: THORACIC SURGERY (CARDIOTHORACIC VASCULAR SURGERY)

## 2024-03-27 PROCEDURE — 63600175 PHARM REV CODE 636 W HCPCS

## 2024-03-27 PROCEDURE — 83735 ASSAY OF MAGNESIUM: CPT | Performed by: STUDENT IN AN ORGANIZED HEALTH CARE EDUCATION/TRAINING PROGRAM

## 2024-03-27 PROCEDURE — 83735 ASSAY OF MAGNESIUM: CPT | Mod: 91 | Performed by: INTERNAL MEDICINE

## 2024-03-27 PROCEDURE — 83735 ASSAY OF MAGNESIUM: CPT | Mod: 91

## 2024-03-27 PROCEDURE — 80053 COMPREHEN METABOLIC PANEL: CPT | Performed by: STUDENT IN AN ORGANIZED HEALTH CARE EDUCATION/TRAINING PROGRAM

## 2024-03-27 PROCEDURE — 85730 THROMBOPLASTIN TIME PARTIAL: CPT | Mod: 91 | Performed by: THORACIC SURGERY (CARDIOTHORACIC VASCULAR SURGERY)

## 2024-03-27 PROCEDURE — 20000000 HC ICU ROOM

## 2024-03-27 PROCEDURE — 88304 TISSUE EXAM BY PATHOLOGIST: CPT | Mod: 26,,, | Performed by: PATHOLOGY

## 2024-03-27 PROCEDURE — 93325 DOPPLER ECHO COLOR FLOW MAPG: CPT | Mod: 26,,, | Performed by: ANESTHESIOLOGY

## 2024-03-27 PROCEDURE — 27202608 HC CANNULA, MISC

## 2024-03-27 PROCEDURE — 25000003 PHARM REV CODE 250: Performed by: STUDENT IN AN ORGANIZED HEALTH CARE EDUCATION/TRAINING PROGRAM

## 2024-03-27 PROCEDURE — 27000221 HC OXYGEN, UP TO 24 HOURS

## 2024-03-27 PROCEDURE — 02100Z9 BYPASS CORONARY ARTERY, ONE ARTERY FROM LEFT INTERNAL MAMMARY, OPEN APPROACH: ICD-10-PCS | Performed by: THORACIC SURGERY (CARDIOTHORACIC VASCULAR SURGERY)

## 2024-03-27 PROCEDURE — 36415 COLL VENOUS BLD VENIPUNCTURE: CPT | Performed by: STUDENT IN AN ORGANIZED HEALTH CARE EDUCATION/TRAINING PROGRAM

## 2024-03-27 PROCEDURE — 85610 PROTHROMBIN TIME: CPT | Performed by: THORACIC SURGERY (CARDIOTHORACIC VASCULAR SURGERY)

## 2024-03-27 PROCEDURE — 63600175 PHARM REV CODE 636 W HCPCS: Performed by: THORACIC SURGERY (CARDIOTHORACIC VASCULAR SURGERY)

## 2024-03-27 PROCEDURE — 33517 CABG ARTERY-VEIN SINGLE: CPT | Mod: ,,, | Performed by: THORACIC SURGERY (CARDIOTHORACIC VASCULAR SURGERY)

## 2024-03-27 PROCEDURE — 25000003 PHARM REV CODE 250

## 2024-03-27 PROCEDURE — 63600175 PHARM REV CODE 636 W HCPCS: Performed by: STUDENT IN AN ORGANIZED HEALTH CARE EDUCATION/TRAINING PROGRAM

## 2024-03-27 PROCEDURE — 85730 THROMBOPLASTIN TIME PARTIAL: CPT | Mod: 91 | Performed by: INTERNAL MEDICINE

## 2024-03-27 PROCEDURE — 63600175 PHARM REV CODE 636 W HCPCS: Mod: JZ,JG

## 2024-03-27 PROCEDURE — P9035 PLATELET PHERES LEUKOREDUCED: HCPCS

## 2024-03-27 PROCEDURE — 82803 BLOOD GASES ANY COMBINATION: CPT

## 2024-03-27 PROCEDURE — 99223 1ST HOSP IP/OBS HIGH 75: CPT | Mod: ,,, | Performed by: PHYSICIAN ASSISTANT

## 2024-03-27 PROCEDURE — 85025 COMPLETE CBC W/AUTO DIFF WBC: CPT | Mod: 91 | Performed by: INTERNAL MEDICINE

## 2024-03-27 PROCEDURE — 25000003 PHARM REV CODE 250: Performed by: THORACIC SURGERY (CARDIOTHORACIC VASCULAR SURGERY)

## 2024-03-27 PROCEDURE — 36000712 HC OR TIME LEV V 1ST 15 MIN: Performed by: THORACIC SURGERY (CARDIOTHORACIC VASCULAR SURGERY)

## 2024-03-27 PROCEDURE — 27201423 OPTIME MED/SURG SUP & DEVICES STERILE SUPPLY: Performed by: THORACIC SURGERY (CARDIOTHORACIC VASCULAR SURGERY)

## 2024-03-27 PROCEDURE — P9045 ALBUMIN (HUMAN), 5%, 250 ML: HCPCS | Mod: JZ,JG

## 2024-03-27 PROCEDURE — 27000445 HC TEMPORARY PACEMAKER LEADS

## 2024-03-27 PROCEDURE — 82330 ASSAY OF CALCIUM: CPT

## 2024-03-27 PROCEDURE — 80053 COMPREHEN METABOLIC PANEL: CPT | Mod: 91 | Performed by: INTERNAL MEDICINE

## 2024-03-27 PROCEDURE — 02B70ZK EXCISION OF LEFT ATRIAL APPENDAGE, OPEN APPROACH: ICD-10-PCS | Performed by: THORACIC SURGERY (CARDIOTHORACIC VASCULAR SURGERY)

## 2024-03-27 PROCEDURE — 33508 ENDOSCOPIC VEIN HARVEST: CPT | Mod: 59,,, | Performed by: THORACIC SURGERY (CARDIOTHORACIC VASCULAR SURGERY)

## 2024-03-27 PROCEDURE — 27100088 HC CELL SAVER

## 2024-03-27 PROCEDURE — 30233K1 TRANSFUSION OF NONAUTOLOGOUS FROZEN PLASMA INTO PERIPHERAL VEIN, PERCUTANEOUS APPROACH: ICD-10-PCS | Performed by: THORACIC SURGERY (CARDIOTHORACIC VASCULAR SURGERY)

## 2024-03-27 PROCEDURE — 63600175 PHARM REV CODE 636 W HCPCS: Performed by: ANESTHESIOLOGY

## 2024-03-27 PROCEDURE — 85730 THROMBOPLASTIN TIME PARTIAL: CPT | Performed by: STUDENT IN AN ORGANIZED HEALTH CARE EDUCATION/TRAINING PROGRAM

## 2024-03-27 PROCEDURE — 27000175 HC ADULT BYPASS PUMP

## 2024-03-27 PROCEDURE — 27201037 HC PRESSURE MONITORING SET UP

## 2024-03-27 PROCEDURE — 5A1221Z PERFORMANCE OF CARDIAC OUTPUT, CONTINUOUS: ICD-10-PCS | Performed by: THORACIC SURGERY (CARDIOTHORACIC VASCULAR SURGERY)

## 2024-03-27 PROCEDURE — 88304 TISSUE EXAM BY PATHOLOGIST: CPT | Performed by: PATHOLOGY

## 2024-03-27 PROCEDURE — 94761 N-INVAS EAR/PLS OXIMETRY MLT: CPT | Mod: XB

## 2024-03-27 PROCEDURE — 83605 ASSAY OF LACTIC ACID: CPT

## 2024-03-27 PROCEDURE — 84295 ASSAY OF SERUM SODIUM: CPT

## 2024-03-27 PROCEDURE — 27000191 HC C-V MONITORING

## 2024-03-27 PROCEDURE — 36000713 HC OR TIME LEV V EA ADD 15 MIN: Performed by: THORACIC SURGERY (CARDIOTHORACIC VASCULAR SURGERY)

## 2024-03-27 PROCEDURE — 85025 COMPLETE CBC W/AUTO DIFF WBC: CPT | Performed by: THORACIC SURGERY (CARDIOTHORACIC VASCULAR SURGERY)

## 2024-03-27 PROCEDURE — 85025 COMPLETE CBC W/AUTO DIFF WBC: CPT | Mod: 91

## 2024-03-27 PROCEDURE — 85610 PROTHROMBIN TIME: CPT | Mod: 91 | Performed by: INTERNAL MEDICINE

## 2024-03-27 PROCEDURE — P9017 PLASMA 1 DONOR FRZ W/IN 8 HR: HCPCS

## 2024-03-27 PROCEDURE — 37000008 HC ANESTHESIA 1ST 15 MINUTES: Performed by: THORACIC SURGERY (CARDIOTHORACIC VASCULAR SURGERY)

## 2024-03-27 PROCEDURE — 99900035 HC TECH TIME PER 15 MIN (STAT)

## 2024-03-27 PROCEDURE — 27200953 HC CARDIOPLEGIA SYSTEM

## 2024-03-27 PROCEDURE — 25000003 PHARM REV CODE 250: Performed by: INTERNAL MEDICINE

## 2024-03-27 PROCEDURE — 27100026 HC SHUNT SENSOR, TERUMO

## 2024-03-27 PROCEDURE — 06BQ4ZZ EXCISION OF LEFT SAPHENOUS VEIN, PERCUTANEOUS ENDOSCOPIC APPROACH: ICD-10-PCS | Performed by: THORACIC SURGERY (CARDIOTHORACIC VASCULAR SURGERY)

## 2024-03-27 PROCEDURE — P9012 CRYOPRECIPITATE EACH UNIT: HCPCS

## 2024-03-27 PROCEDURE — 84100 ASSAY OF PHOSPHORUS: CPT | Performed by: STUDENT IN AN ORGANIZED HEALTH CARE EDUCATION/TRAINING PROGRAM

## 2024-03-27 PROCEDURE — 86920 COMPATIBILITY TEST SPIN: CPT

## 2024-03-27 PROCEDURE — 85025 COMPLETE CBC W/AUTO DIFF WBC: CPT | Mod: 91 | Performed by: STUDENT IN AN ORGANIZED HEALTH CARE EDUCATION/TRAINING PROGRAM

## 2024-03-27 PROCEDURE — 85610 PROTHROMBIN TIME: CPT | Mod: 91

## 2024-03-27 PROCEDURE — 84100 ASSAY OF PHOSPHORUS: CPT | Mod: 91 | Performed by: INTERNAL MEDICINE

## 2024-03-27 PROCEDURE — 82330 ASSAY OF CALCIUM: CPT | Performed by: INTERNAL MEDICINE

## 2024-03-27 PROCEDURE — 84132 ASSAY OF SERUM POTASSIUM: CPT

## 2024-03-27 PROCEDURE — 85384 FIBRINOGEN ACTIVITY: CPT | Performed by: THORACIC SURGERY (CARDIOTHORACIC VASCULAR SURGERY)

## 2024-03-27 PROCEDURE — 37799 UNLISTED PX VASCULAR SURGERY: CPT

## 2024-03-27 PROCEDURE — 37000009 HC ANESTHESIA EA ADD 15 MINS: Performed by: THORACIC SURGERY (CARDIOTHORACIC VASCULAR SURGERY)

## 2024-03-27 PROCEDURE — 36620 INSERTION CATHETER ARTERY: CPT | Mod: 59,,, | Performed by: ANESTHESIOLOGY

## 2024-03-27 PROCEDURE — 93312 ECHO TRANSESOPHAGEAL: CPT | Mod: 26,59,, | Performed by: ANESTHESIOLOGY

## 2024-03-27 PROCEDURE — 36430 TRANSFUSION BLD/BLD COMPNT: CPT

## 2024-03-27 PROCEDURE — 86965 POOLING BLOOD PLATELETS: CPT

## 2024-03-27 PROCEDURE — 99291 CRITICAL CARE FIRST HOUR: CPT | Mod: ,,, | Performed by: ANESTHESIOLOGY

## 2024-03-27 PROCEDURE — 36556 INSERT NON-TUNNEL CV CATH: CPT | Mod: 59,,, | Performed by: ANESTHESIOLOGY

## 2024-03-27 PROCEDURE — 85730 THROMBOPLASTIN TIME PARTIAL: CPT | Mod: 91

## 2024-03-27 PROCEDURE — 82800 BLOOD PH: CPT

## 2024-03-27 PROCEDURE — D9220A PRA ANESTHESIA: Mod: AA,,, | Performed by: ANESTHESIOLOGY

## 2024-03-27 PROCEDURE — 85014 HEMATOCRIT: CPT

## 2024-03-27 PROCEDURE — 30233R1 TRANSFUSION OF NONAUTOLOGOUS PLATELETS INTO PERIPHERAL VEIN, PERCUTANEOUS APPROACH: ICD-10-PCS | Performed by: THORACIC SURGERY (CARDIOTHORACIC VASCULAR SURGERY)

## 2024-03-27 PROCEDURE — 33533 CABG ARTERIAL SINGLE: CPT | Mod: ,,, | Performed by: THORACIC SURGERY (CARDIOTHORACIC VASCULAR SURGERY)

## 2024-03-27 PROCEDURE — 85384 FIBRINOGEN ACTIVITY: CPT | Mod: 91 | Performed by: INTERNAL MEDICINE

## 2024-03-27 PROCEDURE — 93320 DOPPLER ECHO COMPLETE: CPT | Mod: 26,,, | Performed by: ANESTHESIOLOGY

## 2024-03-27 PROCEDURE — 84100 ASSAY OF PHOSPHORUS: CPT | Mod: 91

## 2024-03-27 RX ORDER — POTASSIUM CHLORIDE 29.8 MG/ML
40 INJECTION INTRAVENOUS
Status: DISCONTINUED | OUTPATIENT
Start: 2024-03-27 | End: 2024-03-30

## 2024-03-27 RX ORDER — CEFAZOLIN SODIUM 1 G/3ML
INJECTION, POWDER, FOR SOLUTION INTRAMUSCULAR; INTRAVENOUS
Status: DISCONTINUED | OUTPATIENT
Start: 2024-03-27 | End: 2024-03-27

## 2024-03-27 RX ORDER — LIDOCAINE HYDROCHLORIDE 20 MG/ML
INJECTION, SOLUTION EPIDURAL; INFILTRATION; INTRACAUDAL; PERINEURAL
Status: DISCONTINUED | OUTPATIENT
Start: 2024-03-27 | End: 2024-03-27

## 2024-03-27 RX ORDER — ONDANSETRON HYDROCHLORIDE 2 MG/ML
8 INJECTION, SOLUTION INTRAVENOUS EVERY 6 HOURS PRN
Status: DISCONTINUED | OUTPATIENT
Start: 2024-03-27 | End: 2024-03-31 | Stop reason: HOSPADM

## 2024-03-27 RX ORDER — MAGNESIUM SULFATE HEPTAHYDRATE 40 MG/ML
2 INJECTION, SOLUTION INTRAVENOUS
Status: DISCONTINUED | OUTPATIENT
Start: 2024-03-27 | End: 2024-03-30

## 2024-03-27 RX ORDER — ALBUMIN HUMAN 50 G/1000ML
SOLUTION INTRAVENOUS
Status: COMPLETED
Start: 2024-03-27 | End: 2024-03-27

## 2024-03-27 RX ORDER — NOREPINEPHRINE BITARTRATE/D5W 4MG/250ML
0-3 PLASTIC BAG, INJECTION (ML) INTRAVENOUS CONTINUOUS
Status: DISCONTINUED | OUTPATIENT
Start: 2024-03-27 | End: 2024-03-28

## 2024-03-27 RX ORDER — ONDANSETRON 2 MG/ML
INJECTION INTRAMUSCULAR; INTRAVENOUS
Status: DISCONTINUED | OUTPATIENT
Start: 2024-03-27 | End: 2024-03-27

## 2024-03-27 RX ORDER — PROCHLORPERAZINE EDISYLATE 5 MG/ML
2.5 INJECTION INTRAMUSCULAR; INTRAVENOUS EVERY 6 HOURS PRN
Status: DISCONTINUED | OUTPATIENT
Start: 2024-03-27 | End: 2024-03-31 | Stop reason: HOSPADM

## 2024-03-27 RX ORDER — ROCURONIUM BROMIDE 10 MG/ML
INJECTION, SOLUTION INTRAVENOUS
Status: DISCONTINUED | OUTPATIENT
Start: 2024-03-27 | End: 2024-03-27

## 2024-03-27 RX ORDER — HYDROMORPHONE HYDROCHLORIDE 1 MG/ML
0.5 INJECTION, SOLUTION INTRAMUSCULAR; INTRAVENOUS; SUBCUTANEOUS
Status: COMPLETED | OUTPATIENT
Start: 2024-03-27 | End: 2024-03-27

## 2024-03-27 RX ORDER — HYDROCODONE BITARTRATE AND ACETAMINOPHEN 500; 5 MG/1; MG/1
TABLET ORAL
Status: DISCONTINUED | OUTPATIENT
Start: 2024-03-27 | End: 2024-03-31 | Stop reason: HOSPADM

## 2024-03-27 RX ORDER — PAPAVERINE HYDROCHLORIDE 30 MG/ML
INJECTION INTRAMUSCULAR; INTRAVENOUS
Status: DISCONTINUED | OUTPATIENT
Start: 2024-03-27 | End: 2024-03-27 | Stop reason: HOSPADM

## 2024-03-27 RX ORDER — NOREPINEPHRINE BITARTRATE 1 MG/ML
INJECTION, SOLUTION INTRAVENOUS
Status: DISCONTINUED | OUTPATIENT
Start: 2024-03-27 | End: 2024-03-27

## 2024-03-27 RX ORDER — ESMOLOL HYDROCHLORIDE 10 MG/ML
INJECTION INTRAVENOUS
Status: DISCONTINUED | OUTPATIENT
Start: 2024-03-27 | End: 2024-03-27

## 2024-03-27 RX ORDER — FENTANYL CITRATE 50 UG/ML
INJECTION, SOLUTION INTRAMUSCULAR; INTRAVENOUS
Status: DISCONTINUED | OUTPATIENT
Start: 2024-03-27 | End: 2024-03-27

## 2024-03-27 RX ORDER — HEPARIN SOD,PORCINE/0.9 % NACL 1000/500ML
INTRAVENOUS SOLUTION INTRAVENOUS
Status: DISCONTINUED | OUTPATIENT
Start: 2024-03-27 | End: 2024-03-27 | Stop reason: HOSPADM

## 2024-03-27 RX ORDER — ALBUMIN HUMAN 50 G/1000ML
25 SOLUTION INTRAVENOUS ONCE
Status: COMPLETED | OUTPATIENT
Start: 2024-03-27 | End: 2024-03-27

## 2024-03-27 RX ORDER — HYDROCODONE BITARTRATE AND ACETAMINOPHEN 500; 5 MG/1; MG/1
TABLET ORAL
Status: DISCONTINUED | OUTPATIENT
Start: 2024-03-27 | End: 2024-03-27

## 2024-03-27 RX ORDER — SODIUM CHLORIDE 9 MG/ML
INJECTION, SOLUTION INTRAVENOUS
Status: DISCONTINUED | OUTPATIENT
Start: 2024-03-27 | End: 2024-03-31 | Stop reason: HOSPADM

## 2024-03-27 RX ORDER — CALCIUM GLUCONATE 20 MG/ML
3 INJECTION, SOLUTION INTRAVENOUS
Status: DISCONTINUED | OUTPATIENT
Start: 2024-03-27 | End: 2024-03-30

## 2024-03-27 RX ORDER — MUPIROCIN 20 MG/G
OINTMENT TOPICAL
Status: DISCONTINUED | OUTPATIENT
Start: 2024-03-27 | End: 2024-03-27 | Stop reason: HOSPADM

## 2024-03-27 RX ORDER — ONDANSETRON HYDROCHLORIDE 2 MG/ML
INJECTION, SOLUTION INTRAVENOUS
Status: COMPLETED
Start: 2024-03-27 | End: 2024-03-27

## 2024-03-27 RX ORDER — METOCLOPRAMIDE HYDROCHLORIDE 5 MG/ML
5 INJECTION INTRAMUSCULAR; INTRAVENOUS EVERY 6 HOURS PRN
Status: DISCONTINUED | OUTPATIENT
Start: 2024-03-27 | End: 2024-03-27

## 2024-03-27 RX ORDER — OXYCODONE HYDROCHLORIDE 5 MG/1
5 TABLET ORAL EVERY 4 HOURS PRN
Status: DISCONTINUED | OUTPATIENT
Start: 2024-03-27 | End: 2024-03-31 | Stop reason: HOSPADM

## 2024-03-27 RX ORDER — DOCUSATE SODIUM 100 MG/1
100 CAPSULE, LIQUID FILLED ORAL 2 TIMES DAILY
Status: DISCONTINUED | OUTPATIENT
Start: 2024-03-27 | End: 2024-03-28

## 2024-03-27 RX ORDER — MUPIROCIN 20 MG/G
OINTMENT TOPICAL 2 TIMES DAILY
Status: COMPLETED | OUTPATIENT
Start: 2024-03-27 | End: 2024-03-29

## 2024-03-27 RX ORDER — POTASSIUM CHLORIDE 14.9 MG/ML
60 INJECTION INTRAVENOUS
Status: DISCONTINUED | OUTPATIENT
Start: 2024-03-27 | End: 2024-03-30

## 2024-03-27 RX ORDER — MAGNESIUM SULFATE HEPTAHYDRATE 40 MG/ML
4 INJECTION, SOLUTION INTRAVENOUS
Status: DISCONTINUED | OUTPATIENT
Start: 2024-03-27 | End: 2024-03-30

## 2024-03-27 RX ORDER — CALCIUM GLUCONATE 20 MG/ML
2 INJECTION, SOLUTION INTRAVENOUS
Status: DISCONTINUED | OUTPATIENT
Start: 2024-03-27 | End: 2024-03-30

## 2024-03-27 RX ORDER — HYDROCODONE BITARTRATE AND ACETAMINOPHEN 500; 5 MG/1; MG/1
TABLET ORAL
Status: DISCONTINUED | OUTPATIENT
Start: 2024-03-27 | End: 2024-03-28

## 2024-03-27 RX ORDER — FUROSEMIDE 10 MG/ML
10 INJECTION INTRAMUSCULAR; INTRAVENOUS ONCE
Status: COMPLETED | OUTPATIENT
Start: 2024-03-27 | End: 2024-03-27

## 2024-03-27 RX ORDER — PROTAMINE SULFATE 10 MG/ML
INJECTION, SOLUTION INTRAVENOUS
Status: DISCONTINUED | OUTPATIENT
Start: 2024-03-27 | End: 2024-03-27

## 2024-03-27 RX ORDER — ACETAMINOPHEN 500 MG
1000 TABLET ORAL ONCE
Status: COMPLETED | OUTPATIENT
Start: 2024-03-27 | End: 2024-03-27

## 2024-03-27 RX ORDER — TRANEXAMIC ACID 100 MG/ML
INJECTION, SOLUTION INTRAVENOUS CONTINUOUS PRN
Status: DISCONTINUED | OUTPATIENT
Start: 2024-03-27 | End: 2024-03-27

## 2024-03-27 RX ORDER — KETAMINE HCL IN 0.9 % NACL 50 MG/5 ML
SYRINGE (ML) INTRAVENOUS
Status: DISCONTINUED | OUTPATIENT
Start: 2024-03-27 | End: 2024-03-27

## 2024-03-27 RX ORDER — PHENYLEPHRINE HYDROCHLORIDE 10 MG/ML
INJECTION INTRAVENOUS
Status: DISCONTINUED | OUTPATIENT
Start: 2024-03-27 | End: 2024-03-27

## 2024-03-27 RX ORDER — POTASSIUM CHLORIDE 14.9 MG/ML
20 INJECTION INTRAVENOUS
Status: DISCONTINUED | OUTPATIENT
Start: 2024-03-27 | End: 2024-03-30

## 2024-03-27 RX ORDER — HEPARIN SODIUM 1000 [USP'U]/ML
INJECTION, SOLUTION INTRAVENOUS; SUBCUTANEOUS
Status: DISCONTINUED | OUTPATIENT
Start: 2024-03-27 | End: 2024-03-27

## 2024-03-27 RX ORDER — SODIUM CHLORIDE 9 MG/ML
INJECTION, SOLUTION INTRAVENOUS CONTINUOUS PRN
Status: DISCONTINUED | OUTPATIENT
Start: 2024-03-27 | End: 2024-03-27

## 2024-03-27 RX ORDER — OXYCODONE HYDROCHLORIDE 10 MG/1
10 TABLET ORAL EVERY 4 HOURS PRN
Status: DISCONTINUED | OUTPATIENT
Start: 2024-03-27 | End: 2024-03-31 | Stop reason: HOSPADM

## 2024-03-27 RX ORDER — FAMOTIDINE 10 MG/ML
20 INJECTION INTRAVENOUS 2 TIMES DAILY
Status: DISCONTINUED | OUTPATIENT
Start: 2024-03-27 | End: 2024-03-28

## 2024-03-27 RX ORDER — ACETAMINOPHEN 500 MG
1000 TABLET ORAL EVERY 8 HOURS
Status: DISCONTINUED | OUTPATIENT
Start: 2024-03-27 | End: 2024-03-31 | Stop reason: HOSPADM

## 2024-03-27 RX ORDER — FUROSEMIDE 10 MG/ML
INJECTION INTRAMUSCULAR; INTRAVENOUS
Status: DISCONTINUED | OUTPATIENT
Start: 2024-03-27 | End: 2024-03-27

## 2024-03-27 RX ORDER — ASPIRIN 325 MG
325 TABLET, DELAYED RELEASE (ENTERIC COATED) ORAL DAILY
Status: DISCONTINUED | OUTPATIENT
Start: 2024-03-27 | End: 2024-03-28

## 2024-03-27 RX ORDER — NITROGLYCERIN 5 MG/ML
INJECTION, SOLUTION INTRAVENOUS
Status: DISCONTINUED | OUTPATIENT
Start: 2024-03-27 | End: 2024-03-27

## 2024-03-27 RX ORDER — TRANEXAMIC ACID 100 MG/ML
INJECTION, SOLUTION INTRAVENOUS
Status: DISCONTINUED | OUTPATIENT
Start: 2024-03-27 | End: 2024-03-27

## 2024-03-27 RX ORDER — CALCIUM GLUCONATE 20 MG/ML
1 INJECTION, SOLUTION INTRAVENOUS
Status: DISCONTINUED | OUTPATIENT
Start: 2024-03-27 | End: 2024-03-30

## 2024-03-27 RX ADMIN — OXYCODONE 5 MG: 5 TABLET ORAL at 07:03

## 2024-03-27 RX ADMIN — NOREPINEPHRINE BITARTRATE 0.02 MCG/KG/MIN: 1 INJECTION, SOLUTION, CONCENTRATE INTRAVENOUS at 08:03

## 2024-03-27 RX ADMIN — FUROSEMIDE 20 MG: 10 INJECTION, SOLUTION INTRAMUSCULAR; INTRAVENOUS at 11:03

## 2024-03-27 RX ADMIN — ONDANSETRON HYDROCHLORIDE 8 MG: 2 INJECTION, SOLUTION INTRAVENOUS at 07:03

## 2024-03-27 RX ADMIN — PHENYLEPHRINE HYDROCHLORIDE 200 MCG: 10 INJECTION INTRAVENOUS at 08:03

## 2024-03-27 RX ADMIN — NITROGLYCERIN 50 MCG: 5 INJECTION, SOLUTION INTRAVENOUS at 12:03

## 2024-03-27 RX ADMIN — ESMOLOL HYDROCHLORIDE 50 MG: 100 INJECTION, SOLUTION INTRAVENOUS at 08:03

## 2024-03-27 RX ADMIN — EPINEPHRINE 0.05 MCG/KG/MIN: 1 INJECTION INTRAMUSCULAR; INTRAVENOUS; SUBCUTANEOUS at 11:03

## 2024-03-27 RX ADMIN — ONDANSETRON 8 MG: 2 INJECTION INTRAMUSCULAR; INTRAVENOUS at 07:03

## 2024-03-27 RX ADMIN — PHENYLEPHRINE HYDROCHLORIDE 50 MCG: 10 INJECTION INTRAVENOUS at 10:03

## 2024-03-27 RX ADMIN — ONDANSETRON 1 G: 2 INJECTION INTRAMUSCULAR; INTRAVENOUS at 11:03

## 2024-03-27 RX ADMIN — ALBUMIN (HUMAN) 25 G: 12.5 SOLUTION INTRAVENOUS at 04:03

## 2024-03-27 RX ADMIN — SODIUM CHLORIDE: 9 INJECTION, SOLUTION INTRAVENOUS at 02:03

## 2024-03-27 RX ADMIN — HEPARIN SODIUM 24000 UNITS: 1000 INJECTION, SOLUTION INTRAVENOUS; SUBCUTANEOUS at 09:03

## 2024-03-27 RX ADMIN — PROCHLORPERAZINE EDISYLATE 2.5 MG: 5 INJECTION INTRAMUSCULAR; INTRAVENOUS at 03:03

## 2024-03-27 RX ADMIN — SODIUM CHLORIDE, SODIUM GLUCONATE, SODIUM ACETATE, POTASSIUM CHLORIDE, MAGNESIUM CHLORIDE, SODIUM PHOSPHATE, DIBASIC, AND POTASSIUM PHOSPHATE: .53; .5; .37; .037; .03; .012; .00082 INJECTION, SOLUTION INTRAVENOUS at 08:03

## 2024-03-27 RX ADMIN — HEPARIN SODIUM 5000 UNITS: 1000 INJECTION, SOLUTION INTRAVENOUS; SUBCUTANEOUS at 10:03

## 2024-03-27 RX ADMIN — NOREPINEPHRINE BITARTRATE 16 MCG: 1 INJECTION, SOLUTION, CONCENTRATE INTRAVENOUS at 11:03

## 2024-03-27 RX ADMIN — LIDOCAINE HYDROCHLORIDE 100 MG: 20 INJECTION, SOLUTION EPIDURAL; INFILTRATION; INTRACAUDAL; PERINEURAL at 08:03

## 2024-03-27 RX ADMIN — ACETAMINOPHEN 1000 MG: 500 TABLET ORAL at 07:03

## 2024-03-27 RX ADMIN — LIDOCAINE HYDROCHLORIDE 100 MG: 20 INJECTION, SOLUTION EPIDURAL; INFILTRATION; INTRACAUDAL; PERINEURAL at 11:03

## 2024-03-27 RX ADMIN — PHENYLEPHRINE HYDROCHLORIDE 200 MCG: 10 INJECTION INTRAVENOUS at 09:03

## 2024-03-27 RX ADMIN — METHADONE HYDROCHLORIDE 14.24 MG: 10 INJECTION, SOLUTION INTRAMUSCULAR; INTRAVENOUS; SUBCUTANEOUS at 08:03

## 2024-03-27 RX ADMIN — ACETAMINOPHEN 1000 MG: 500 TABLET ORAL at 09:03

## 2024-03-27 RX ADMIN — MUPIROCIN: 20 OINTMENT TOPICAL at 07:03

## 2024-03-27 RX ADMIN — INSULIN HUMAN 1 UNITS/HR: 1 INJECTION, SOLUTION INTRAVENOUS at 02:03

## 2024-03-27 RX ADMIN — CEFAZOLIN 2 G: 2 INJECTION, POWDER, FOR SOLUTION INTRAMUSCULAR; INTRAVENOUS at 06:03

## 2024-03-27 RX ADMIN — FAMOTIDINE 20 MG: 10 INJECTION, SOLUTION INTRAVENOUS at 09:03

## 2024-03-27 RX ADMIN — ROCURONIUM BROMIDE 100 MG: 10 INJECTION, SOLUTION INTRAVENOUS at 08:03

## 2024-03-27 RX ADMIN — HYDROMORPHONE HYDROCHLORIDE 0.5 MG: 1 INJECTION, SOLUTION INTRAMUSCULAR; INTRAVENOUS; SUBCUTANEOUS at 11:03

## 2024-03-27 RX ADMIN — ALBUMIN HUMAN 25 G: 50 SOLUTION INTRAVENOUS at 01:03

## 2024-03-27 RX ADMIN — ESMOLOL HYDROCHLORIDE 50 MG: 100 INJECTION, SOLUTION INTRAVENOUS at 09:03

## 2024-03-27 RX ADMIN — NITROGLYCERIN 25 MCG: 5 INJECTION, SOLUTION INTRAVENOUS at 11:03

## 2024-03-27 RX ADMIN — FENTANYL CITRATE 400 MCG: 50 INJECTION, SOLUTION INTRAMUSCULAR; INTRAVENOUS at 08:03

## 2024-03-27 RX ADMIN — ALBUMIN HUMAN 25 G: 50 SOLUTION INTRAVENOUS at 04:03

## 2024-03-27 RX ADMIN — HYDROMORPHONE HYDROCHLORIDE 0.5 MG: 1 INJECTION, SOLUTION INTRAMUSCULAR; INTRAVENOUS; SUBCUTANEOUS at 04:03

## 2024-03-27 RX ADMIN — MUPIROCIN: 20 OINTMENT TOPICAL at 09:03

## 2024-03-27 RX ADMIN — ROCURONIUM BROMIDE 20 MG: 10 INJECTION, SOLUTION INTRAVENOUS at 10:03

## 2024-03-27 RX ADMIN — TRANEXAMIC ACID 700 MG: 100 INJECTION INTRAVENOUS at 08:03

## 2024-03-27 RX ADMIN — PROTAMINE SULFATE 260 MG: 10 INJECTION, SOLUTION INTRAVENOUS at 11:03

## 2024-03-27 RX ADMIN — Medication 50 MG: at 08:03

## 2024-03-27 RX ADMIN — CEFAZOLIN 2 G: 330 INJECTION, POWDER, FOR SOLUTION INTRAMUSCULAR; INTRAVENOUS at 09:03

## 2024-03-27 RX ADMIN — FUROSEMIDE 10 MG: 10 INJECTION, SOLUTION INTRAVENOUS at 09:03

## 2024-03-27 RX ADMIN — TRANEXAMIC ACID 1 MG/KG/HR: 100 INJECTION INTRAVENOUS at 08:03

## 2024-03-27 RX ADMIN — SODIUM CHLORIDE: 0.9 INJECTION, SOLUTION INTRAVENOUS at 08:03

## 2024-03-27 RX ADMIN — SUGAMMADEX 200 MG: 100 INJECTION, SOLUTION INTRAVENOUS at 12:03

## 2024-03-27 RX ADMIN — ROCURONIUM BROMIDE 50 MG: 10 INJECTION, SOLUTION INTRAVENOUS at 09:03

## 2024-03-27 RX ADMIN — ALBUMIN (HUMAN) 25 G: 12.5 SOLUTION INTRAVENOUS at 01:03

## 2024-03-27 RX ADMIN — HYDROMORPHONE HYDROCHLORIDE 0.5 MG: 1 INJECTION, SOLUTION INTRAMUSCULAR; INTRAVENOUS; SUBCUTANEOUS at 01:03

## 2024-03-27 RX ADMIN — ALBUMIN (HUMAN) 25 G: 12.5 SOLUTION INTRAVENOUS at 06:03

## 2024-03-27 RX ADMIN — ONDANSETRON 8 MG: 8 TABLET, ORALLY DISINTEGRATING ORAL at 02:03

## 2024-03-27 NOTE — H&P
[facility] - Surgical Intensive Care  Critical Care - Surgery  History & Physical    Patient Name: Gerald Wilkinson  MRN: [id number]  Admission Date: 3/22/2024  Code Status: Full Code  Attending Physician: Tony Canas MD   Primary Care Provider: Phil Tellez MD   Principal Problem: Left main coronary artery thrombosis    Subjective:     HPI:  Gerald Wilkinson is a 73 y.o. male with PMH of HTN admitted following positive stress test. He reports intermittent chest pain and tightness for the past 10 days. This led to outpatient stress test which was positive. He was then told to go to the ED for admission and LHC. LHC done yesterday revealved multivessel CAD including severe LM disease. He was loaded with Plavix and started on Heparin gtt. Currently the patient denies any chest pain. This morning he did go into afib w/ RVR while getting up to use the rest room. He reports normally being very active. His work involves lifting heavy equipment routinely.     The patient presents to the SICU s/p CABGx2 (LIMA-LAD and SVG-OM) and MAZE with Dr. Canas on 03/27/2024. On admission, they are awake, extubated on 6L supplement o2 via venti mask Inotropic and vasopressor requirements upon admission are 0.6 mcg/kg/min of norepinephrine to maintain blood pressure at a MAP 75-85 and SBP < 140. Central access includes a L subclavian CVC, arterial access includes a left radial arterial line. They also have 2 pleural and 2 mediastinal chest tubes with appropriate output.    Intraoperatively, they received 2.5 of crystalloid, 1 FFP, 1 platelets. Urine output intraoperatively was 600cc. The pre-operative echocardiogram was notable for Normal biventricular systolic function, EF 55%, Mildly dilated RV, Mild AI, MR. Post-operative echo was normal,  Ejection Fraction: > 55%, Regional Wall Abnormalities: no RWMA.    Immediate post-operative plans include hemodynamic stabilization and weaning of cardiac and respiratory support. Plan to  wean vasopressors and inotropes as tolerated, and closely monitor fluid status with strict Is/Os and continued fluid resuscitation as needed.      Hospital/ICU Course:  No notes on file    Follow-up For: Procedure(s) (LRB):  CORONARY ARTERY BYPASS GRAFT (CABG) (N/A)  HARVEST-VEIN-ENDOVASCULAR (Left)    Post-Operative Day: Day of Surgery     History reviewed. No pertinent past medical history.    Past Surgical History:   Procedure Laterality Date    CORONARY ANGIOGRAPHY N/A 3/22/2024    Procedure: ANGIOGRAM, CORONARY ARTERY;  Surgeon: Yumi Bal MD;  Location: [facility] CATH LAB;  Service: Cardiology;  Laterality: N/A;    LEFT HEART CATHETERIZATION Left 3/22/2024    Procedure: Left heart cath;  Surgeon: Yumi Bal MD;  Location: [facility] CATH LAB;  Service: Cardiology;  Laterality: Left;       Review of patient's allergies indicates:  No Known Allergies    Family History       Problem Relation (Age of Onset)    Cancer Mother, Sister    Hypertension Mother, Sister          Tobacco Use    Smoking status: Never    Smokeless tobacco: Never   Substance and Sexual Activity    Alcohol use: No    Drug use: Yes    Sexual activity: Yes     Partners: Female        Objective:     Vital Signs (Most Recent):  Temp: 98 °F (36.7 °C) (03/27/24 0642)  Pulse: 82 (03/27/24 0642)  Resp: 18 (03/27/24 0642)  BP: (!) 177/80 (03/27/24 0642)  SpO2: 99 % (03/27/24 0642) Vital Signs (24h Range):  Temp:  [97.5 °F (36.4 °C)-98.5 °F (36.9 °C)] 98 °F (36.7 °C)  Pulse:  [65-82] 82  Resp:  [18] 18  SpO2:  [97 %-99 %] 99 %  BP: (136-177)/(62-80) 177/80     Weight: 71.2 kg (156 lb 15.5 oz)  Body mass index is 23.18 kg/m².      Intake/Output Summary (Last 24 hours) at 3/27/2024 1220  Last data filed at 3/27/2024 1153  Gross per 24 hour   Intake 1451.12 ml   Output 1150 ml   Net 301.12 ml          Physical Exam  Constitutional:       Interventions: He is sedated and intubated.   HENT:      Mouth/Throat:      Mouth: Mucous membranes are dry.      Pharynx:  Oropharynx is clear.   Eyes:      Extraocular Movements: Extraocular movements intact.      Pupils: Pupils are equal, round, and reactive to light.   Cardiovascular:      Rate and Rhythm: Normal rate. Rhythm irregular.      Pulses: Normal pulses.      Comments: Left radial arterial line  Ventricular pacing wires   Pulmonary:      Effort: No respiratory distress. He is intubated.      Comments: 2 Mediastinal and 1 pleural chest tube  Abdominal:      General: Abdomen is flat.      Palpations: Abdomen is soft.   Genitourinary:     Comments: Dunn in place   Musculoskeletal:      Cervical back: Neck supple.      Right lower leg: No edema.      Left lower leg: No edema.   Skin:     General: Skin is warm and dry.      Capillary Refill: Capillary refill takes less than 2 seconds.      Comments: MSI CDI            Vents:       Lines/Drains/Airways       Central Venous Catheter Line  Duration             Introducer 03/27/24 0842 <1 day              Drain  Duration                  Urethral Catheter 03/27/24 0823 Non-latex;Straight-tip;Temperature probe 16 Fr. <1 day              Airway  Duration                  Airway - Non-Surgical 03/27/24 0813 <1 day              Arterial Line  Duration             Arterial Line 03/27/24 0840 Left Radial <1 day              Peripheral Intravenous Line  Duration                  Peripheral IV - Single Lumen 03/22/24 1300 18 G Left Antecubital 4 days         Peripheral IV - Single Lumen 03/23/24 1430 18 G Anterior;Right Forearm 3 days         Peripheral IV - Single Lumen 03/27/24 0818 14 G  Left Upper Arm <1 day                    Significant Labs:    CBC/Anemia Profile:  Recent Labs   Lab 03/26/24  0325 03/27/24  0538 03/27/24  1039 03/27/24  1108 03/27/24  1139   WBC 7.51 7.47  --   --   --    HGB 15.2 16.4  --   --   --    HCT 45.7 50.6 33* 33* 34*    245  --   --   --    MCV 90 92  --   --   --    RDW 12.6 12.6  --   --   --         Chemistries:  Recent Labs   Lab 03/26/24 0325  03/27/24  0538    138   K 4.1 3.8    106   CO2 21* 23   BUN 24* 21   CREATININE 0.9 1.0   CALCIUM 9.0 9.6   ALBUMIN 3.5 4.0   PROT 6.0 7.4   BILITOT 0.6 0.7   ALKPHOS 35* 42*   ALT 20 45*   AST 17 38   MG 2.1 2.2   PHOS 3.5 2.7       All pertinent labs within the past 24 hours have been reviewed.    Significant Imaging: I have reviewed all pertinent imaging results/findings within the past 24 hours.  Assessment/Plan:     Cardiac/Vascular  * Left main coronary artery thrombosis      Neuro/Psych:     - Sedation: none    - Pain:    - Scheduled Tylenol 1g q8h   -  Oxy PRN             Cardiac:     - S/P CABGx2 (LMA-LAD and SVG-OM) with Dr. Canas on 3/27/24    - BP Goal: [id number] and SBP <140    - Cleviprex PRN    - Pressors: levo 0.06    - Anti-HTNs: Hydralazine PRN    - Rhythm: NSR    - Beta blocker: lopressor 25    - Statin: Atorvastatin 80 mg QD      Pulmonary:     - Goal SpO2 >92%    - Will wean ventilator support as tolerated to extubate    - Chest Tubes x 3 (2 Meds & 1 Pleural)    - ABGs PRN      Renal:    - Trend BUN/Cr   - 500 albumin    - Maintain Dunn, record strict Is/Os    Recent Labs   Lab 03/26/24  0325 03/27/24  0538 03/27/24  1330   BUN 24* 21 18   CREATININE 0.9 1.0 1.0         FEN / GI:     - Daily CMP, PRN K/Mag/Phos per protocol     - Replace electrolytes as needed    - Nutrition NPO sps with meds    - Bowel Regimen: docusate      ID:     - Afebrile    - WBC stable    - Abx: Complete perioperative cefazolin 2g Q8H x 5 doses    Recent Labs   Lab 03/27/24  0538 03/27/24  1216 03/27/24  1330   WBC 7.47 14.37* 15.52*         Heme/Onc:     - Hgb 16.4 pre-operatively    - CBC daily    - ASA 325mg daily    - 1FFP, 1 Plt, 10 cryo     Recent Labs   Lab 03/26/24  1209 03/27/24  0538 03/27/24  1216 03/27/24  1330   HGB  --  16.4 12.0* 11.5*   PLT  --  245 134* 204   APTT  --  44.1* 27.6 27.3   INR 1.0  --  1.2 1.1         Endocrine:     - CTS Goal -140    - HgbA1c:     - Endocrinology  consulted for insulin management      PPx:   Feeding: NPO, sips with meds,   Analgesia/Sedation: as above  Thromboembolic Prevention: SCDs  HOB >30: Yes  Stress Ulcer: famotidine  Glucose Control: Yes, insulin management per Endocrinology     Lines/Drains/Airway:     Left radial arterial line   L subclavian CVC   Dunn   Chest Tubes: 4 (2 Mediastinal, 2 Pleural)    Pacing Wires: Temporary ventricular pacing wires      Dispo/Code Status/Palliative:     - SICU    - Full Code          Critical care was time spent personally by me on the following activities: development of treatment plan with patient or surrogate and bedside caregivers, discussions with consultants, evaluation of patient's response to treatment, examination of patient, ordering and performing treatments and interventions, ordering and review of laboratory studies, ordering and review of radiographic studies, pulse oximetry, re-evaluation of patient's condition.  This critical care time did not overlap with that of any other provider or involve time for any procedures.     Celina Novoa MD  Critical Care - Surgery  [facility] - Surgical Intensive Care

## 2024-03-27 NOTE — CONSULTS
[facility] - Surgical Intensive Care  Endocrinology  Diabetes Consult Note    Consult Requested by: Tony Canas MD   Reason for admit: Left main coronary artery thrombosis    HISTORY OF PRESENT ILLNESS:  Reason for Consult: Management of Hyperglycemia     Surgical Procedure and Date: CABG 3/27/24    Diabetes diagnosis year: No hx of DM and does not take DM meds per chart review     HPI:   Patient is a 73 y.o. male with PMH of HTN admitted following positive stress test. He reports intermittent chest pain and tightness for the past 10 days. This led to outpatient stress test which was positive. He was then told to go to the ED for admission and LHC. LHC done yesterday revealved multivessel CAD including severe LM disease. He was loaded with Plavix and started on Heparin gtt. Currently the patient denies any chest pain. This morning he did go into afib w/ RVR while getting up to use the rest room. He reports normally being very active. His work involves lifting heavy equipment routinely. Now presents to SICU s/p CABG. Endocrine consulted for BG management.         Interval HPI:   No acute events overnight. Patient in room 03145 CVICU/79983 CVICU A. Blood glucose stable. BG at goal on current insulin regimen (IIP). Steroid use- None .   Day of Surgery  Renal function- Normal   Vasopressors-  None     Diet NPO Except for: Sips with Medication     Eating:   NPO  Nausea: No  Hypoglycemia and intervention: No  Fever: No  TPN and/or TF: No    PMH, PSH, FH, SH updated and reviewed     ROS:    Review of Systems   Unable to perform ROS: Acuity of condition      Current Medications and/or Treatments Impacting Glycemic Control  Immunotherapy:    Immunosuppressants       None          Steroids:   Hormones (From admission, onward)      Start     Stop Route Frequency Ordered    03/22/24 1411  melatonin tablet 6 mg         -- Oral Nightly PRN 03/22/24 1312          Pressors:    Autonomic Drugs (From admission, onward)      Start    "  Stop Route Frequency Ordered    03/27/24 1344  NORepinephrine 4 mg in dextrose 5% 250 mL infusion (premix)        Question Answer Comment   Begin at (in mcg/kg/min): 0.02    Titrate by: (in mcg/kg/min (RANGE PREFERRED) 0.02 - 0.2    Titrate interval: (in minutes) (RANGE PREFERRED) 2 - 5    Titrate to maintain: (MAP or SBP) MAP    Titrate to keep MAP within the range or GREATER than (if single number): (in mmHg) OTHER    Other 60    Maximum dose: (in mcg/kg/min) 3        -- IV Continuous 03/27/24 1345          Hyperglycemia/Diabetes Medications:   Antihyperglycemics (From admission, onward)      Start     Stop Route Frequency Ordered    03/27/24 1430  insulin regular in 0.9 % NaCl 100 unit/100 mL (1 unit/mL) infusion        Question Answer Comment   Insulin rate changes (DO NOT MODIFY ANSWER) \\[email or web address]\epic\Images\Pharmacy\InsulinInfusions\CTS INSULIN JJ901A.pdf    Enter initial dose (Units/hr): 1        -- IV Continuous 03/27/24 1336             PHYSICAL EXAMINATION:  Vitals:    03/27/24 1400   BP:    Pulse: 89   Resp: 19   Temp: 97.6 °F (36.4 °C)     Body mass index is 23.18 kg/m².     Physical Exam  HENT:      Head: Normocephalic and atraumatic.   Pulmonary:      Comments: Intubated  Neurological:      Comments: Sedated            Labs Reviewed and Include   Recent Labs   Lab 03/27/24  0538 03/27/24  1330   GLU 87 138*   CALCIUM 9.6 8.1*   ALBUMIN 4.0  --    PROT 7.4  --     138   K 3.8 4.5   CO2 23 22*    106   BUN 21 18   CREATININE 1.0 1.0   ALKPHOS 42* 38*   ALT 45*  --    AST 38  --    BILITOT 0.7 0.8     Lab Results   Component Value Date    WBC 15.52 (H) 03/27/2024    HGB 11.5 (L) 03/27/2024    HCT 34.2 (L) 03/27/2024    MCV 90 03/27/2024     03/27/2024     No results for input(s): "TSH", "FREET4" in the last 168 hours.  No results found for: "HGBA1C"    Nutritional status:   Body mass index is 23.18 kg/m².  Lab Results   Component Value Date    ALBUMIN 4.0 03/27/2024    ALBUMIN 3.5 " "03/26/2024    ALBUMIN 3.6 03/25/2024     No results found for: "PREALBUMIN"    Estimated Creatinine Clearance: 63.8 mL/min (based on SCr of 1 mg/dL).    Accu-Checks  Recent Labs     03/27/24  1330 03/27/24  1411   POCTGLUCOSE 146* 148*        ASSESSMENT and PLAN    Cardiac/Vascular  * Left main coronary artery thrombosis  Managed per primary team  Optimize bg control        Endocrine  Stress hyperglycemia  BG goal: 140-180  No known hx of DM.  S/p CABG.  Start on IIP per CTS protocol    - -Start Intensive insulin drip    - POCT Glucose every hour  - Hypoglycemia protocol in place      ** Please notify Endocrine for any change and/or advance in diet**  ** Please call Endocrine for any BG related issues **     Discharge Planning:   TBD. Please notify endocrinology prior to discharge.        GI  Gastroesophageal reflux disease without esophagitis  Managed per primary team  Optimize bg control            Plan discussed with patient, family, and RN at bedside.     Jeancarlos Sin PA-C  Endocrinology  [facility] - [facility]  "

## 2024-03-27 NOTE — HPI
Reason for Consult: Management of Hyperglycemia     Surgical Procedure and Date: CABG 3/27/24    Diabetes diagnosis year: No hx of DM and does not take DM meds per chart review     HPI:   Patient is a 73 y.o. male with PMH of HTN admitted following positive stress test. He reports intermittent chest pain and tightness for the past 10 days. This led to outpatient stress test which was positive. He was then told to go to the ED for admission and LHC. LHC done yesterday revealved multivessel CAD including severe LM disease. He was loaded with Plavix and started on Heparin gtt. Currently the patient denies any chest pain. This morning he did go into afib w/ RVR while getting up to use the rest room. He reports normally being very active. His work involves lifting heavy equipment routinely. Now presents to SICU s/p CABG. Endocrine consulted for BG management.

## 2024-03-27 NOTE — OPERATIVE NOTE ADDENDUM
Certification of Assistant at Surgery       Surgery Date: 3/27/2024     Participating Surgeons:  Surgeon(s) and Role:     * Tony Canas MD - Primary    Procedures:  Procedure(s) (LRB):  CORONARY ARTERY BYPASS GRAFT (CABG) (N/A)  HARVEST-VEIN-ENDOVASCULAR (Left)    Assistant Surgeon's Certification of Necessity:  I understand that section 1842 (b) (6) (d) of the Social Security Act generally prohibits Medicare Part B reasonable charge payment for the services of assistants at surgery in teaching hospitals when qualified residents are available to furnish such services. I certify that the services for which payment is claimed were medically necessary, and that no qualified resident was available to perform the services. I further understand that these services are subject to post-payment review by the Medicare carrier.      CLOTILDE CATHERINE PA-C    03/27/2024  1:35 PM

## 2024-03-27 NOTE — NURSING
Nurses Note -- 4 Eyes      3/27/2024   5:23 PM      Skin assessed during: Admit      [] No Altered Skin Integrity Present    []Prevention Measures Documented      [x] Yes- Altered Skin Integrity Present or Discovered   [] LDA Added if Not in Epic (Describe Wound)   [] New Altered Skin Integrity was Present on Admit and Documented in LDA   [] Wound Image Taken    Wound Care Consulted? No    Attending Nurse:  Genevieve Torres RN/Staff Member:  Leticia Beard incisions present, LDAs in chart.

## 2024-03-27 NOTE — ASSESSMENT & PLAN NOTE
BG goal: 140-180  No known hx of DM.  S/p CABG.  Start on IIP per CTS protocol    - -Start Intensive insulin drip    - POCT Glucose every hour  - Hypoglycemia protocol in place      ** Please notify Endocrine for any change and/or advance in diet**  ** Please call Endocrine for any BG related issues **     Discharge Planning:   TBD. Please notify endocrinology prior to discharge.

## 2024-03-27 NOTE — TRANSFER OF CARE
"Anesthesia Transfer of Care Note    Patient: Gerald Wilkinson    Procedure(s) Performed: Procedure(s) (LRB):  CORONARY ARTERY BYPASS GRAFT (CABG) (N/A)  HARVEST-VEIN-ENDOVASCULAR (Left)    Patient location: ICU    Anesthesia Type: general    Transport from OR: Transported from OR on 6-10 L/min O2 by face mask with adequate spontaneous ventilation    Post pain: adequate analgesia    Post assessment: no apparent anesthetic complications    Post vital signs: stable    Level of consciousness: awake, alert and oriented    Nausea/Vomiting: no nausea/vomiting    Complications: none    Transfer of care protocol was followed      Last vitals: Visit Vitals  BP (!) 177/80 (BP Location: Right arm, Patient Position: Lying)   Pulse 82   Temp 36.7 °C (98 °F) (Oral)   Resp 18   Ht 5' 9" (1.753 m)   Wt 71.2 kg (156 lb 15.5 oz)   SpO2 99%   BMI 23.18 kg/m²     "

## 2024-03-27 NOTE — PLAN OF CARE
Please merge this note with today's resident critical care note.    SICU Staff Addendum  I have reviewed and concur with the resident's/NP's history, physical, assessment, and plan.  I have personally interviewed and examined the patient at bedside.  See below for any additional findings/changes.    Reason for admission:  Left main coronary artery thrombosis  Present on Admission:   Positive cardiac stress test   Paroxysmal atrial fibrillation with rapid ventricular response   Benign essential hypertension   Left main coronary artery thrombosis   Gastroesophageal reflux disease without esophagitis      Active issues/Goals for Today:     POD 0 s/p urgent CABGx2, LAAL. He tolerated the procedure well and arrived to the SICU extubated and on low dose levophed.    - Pain control  - Wean levo as able for MAP > 75 with gentle fluid resuscitation and follow clinical markers of perfusion. Hold BB until reliably tolerated. Start ASA if CT output is acceptable. Resume statin.   - Wean supplemental oxygen for SpO2 > 88-90%, pO2 > 65   - Cardiac diet as tolerated. OGT to LCWS   - Monitor UOP and aim for > 0.5 cc/kg/hour   - Monitor CT output   - Monitor Hgb and aim for Hgb > 7   - SUP     35 minutes of critical care time was spent personally by me on the following activities: development of treatment plan with patient or surrogate and bedside caregivers, discussions with consultants, evaluation of patient's response to treatment, examination of patient, ordering and performing treatments and interventions, ordering and review of laboratory studies, ordering and review of radiographic studies, pulse oximetry, re-evaluation of patient's condition.  This critical care time did not overlap with that of any other provider or involve time for any procedures.    Vitor Michelle MD  Anesthesiology/Critical Care

## 2024-03-27 NOTE — ANESTHESIA PROCEDURE NOTES
Arterial    Diagnosis: CAD    Patient location during procedure: done in OR    Staffing  Authorizing Provider: William Gant MD  Performing Provider: Lucy Rivera MD    Staffing  Performed by: Lucy Rivera MD  Authorized by: William Gant MD    Anesthesiologist was present at the time of the procedure.    Preanesthetic Checklist  Completed: patient identified, IV checked, site marked, risks and benefits discussed, surgical consent, monitors and equipment checked, pre-op evaluation, timeout performed and anesthesia consent givenArterial  Skin Prep: chlorhexidine gluconate  Local Infiltration: lidocaine  Orientation: left  Location: radial    Catheter Size: 20 G  Catheter placement by Ultrasound guidance. Heme positive aspiration all ports.   Vessel Caliber: medium, patent, compressibility normal  Needle advanced into vessel with real time Ultrasound guidance.Insertion Attempts: 1  Assessment  Dressing: secured with tape and tegaderm  Patient: Tolerated well

## 2024-03-27 NOTE — HPI
Gerald Wilkinson is a 73 y.o. male with PMH of HTN admitted following positive stress test. He reports intermittent chest pain and tightness for the past 10 days. This led to outpatient stress test which was positive. He was then told to go to the ED for admission and LHC. LHC done yesterday revealved multivessel CAD including severe LM disease. He was loaded with Plavix and started on Heparin gtt. Currently the patient denies any chest pain. This morning he did go into afib w/ RVR while getting up to use the rest room. He reports normally being very active. His work involves lifting heavy equipment routinely.     The patient presents to the SICU s/p CABGx2 (LIMA-LAD and SVG-OM) and MAZE with Dr. Canas on 03/27/2024. On admission, they are awake, extubated on 6L supplement o2 via venti mask Inotropic and vasopressor requirements upon admission are 0.6 mcg/kg/min of norepinephrine to maintain blood pressure at a MAP 75-85 and SBP < 140. Central access includes a L subclavian CVC, arterial access includes a left radial arterial line. They also have 2 pleural and 2 mediastinal chest tubes with appropriate output.    Intraoperatively, they received 2.5 of crystalloid, 1 FFP, 1 platelets. Urine output intraoperatively was 600cc. The pre-operative echocardiogram was notable for Normal biventricular systolic function, EF 55%, Mildly dilated RV, Mild AI, MR. Post-operative echo was normal,  Ejection Fraction: > 55%, Regional Wall Abnormalities: no RWMA.    Immediate post-operative plans include hemodynamic stabilization and weaning of cardiac and respiratory support. Plan to wean vasopressors and inotropes as tolerated, and closely monitor fluid status with strict Is/Os and continued fluid resuscitation as needed.

## 2024-03-27 NOTE — PROGRESS NOTES
Autotransfusion/Rapid Infusion Record:      03/27/2024  Autotransfusionist:  Beth Gardiner    Surgeon(s) and Role:     * Tony Canas MD - Primary  Anesthesiologist:  William Gant MD    History reviewed. No pertinent past medical history.    Procedure(s) (LRB):  CORONARY ARTERY BYPASS GRAFT (CABG) (N/A)  HARVEST-VEIN-ENDOVASCULAR (Left)     12:20 PM    Equipment:    Cell Saver     R.I.S.  : [employer] Model: CATSmart or CATSplus : [employer]   Model: [id number]     Serial number: [id number]   Serial number: 0   Disposable lot #: [id number]   Disposable lot #: 0     Were extra cardiotomies used for cell saver?  N   if yes, #:  0    Solutions:  Anticoagulant: ACD-A   Expiration date: 11/24 Volume used: 500   Wash solution: 0.9% NaCl   Expiration date: 01/26 Volume used: 1871     Cell saver checklist  Time completed:           [x]   Circuit assembled correctly     [x]   Cell saver powered and operational     [x]   Vacuum connected, functional, adjust to max -150mmHg     [x]   Anticoagulant drip rate adjusted     [x]   Transfer bag properly labeled with patient name, expiration time, volume,       anticoagulant, OR number, and initials     [x]   Cell saver disinfected after use (completed at end of case)       Cell Saver volumes:    Total volume processed:     1237 mL     Total volume pRBCs recovered     171 mL     Volume pRBCs infused     171 mL         RIS checklist   Time completed:  []   RIS circuit assembled correctly     []   RIS power and operational     []   RIS disinfected after use (completed at end of case)       RIS volumes:    Total volume infused:    (see anesthesia record for blood       product information)   0 mL       Additional comments:

## 2024-03-27 NOTE — H&P
[facility] - Surgery (2nd Fl)  History & Physical  Cardiothoracic Surgery    SUBJECTIVE:     Chief Complaint/Reason for Admission: chest pain    History of Present Illness:  Gerald Wilkinson is a 73 y.o. male with PMH of HTN admitted following positive stress test. He reports intermittent chest pain and tightness for the past 10 days. This led to outpatient stress test which was positive. He was then told to go to the ED for admission and LHC. LHC done yesterday revealved multivessel CAD including severe LM disease. He was loaded with Plavix and started on Heparin gtt. Currently the patient denies any chest pain. This morning he did go into afib w/ RVR while getting up to use the rest room.   He reports normally being very active. His work involves lifting heavy equipment routinely.      Denies tobacco, EtOH. No prior sternotomies.     PTA Medications   Medication Sig    valsartan (DIOVAN) 160 MG tablet Take 1 tablet (160 mg total) by mouth once daily.    multivitamin capsule Take 1 capsule by mouth once daily.       Review of patient's allergies indicates:  No Known Allergies    History reviewed. No pertinent past medical history.  Past Surgical History:   Procedure Laterality Date    CORONARY ANGIOGRAPHY N/A 3/22/2024    Procedure: ANGIOGRAM, CORONARY ARTERY;  Surgeon: Yumi Bal MD;  Location: [facility] CATH LAB;  Service: Cardiology;  Laterality: N/A;    LEFT HEART CATHETERIZATION Left 3/22/2024    Procedure: Left heart cath;  Surgeon: Yumi Bal MD;  Location: [facility] CATH LAB;  Service: Cardiology;  Laterality: Left;       Social History     Tobacco Use    Smoking status: Never    Smokeless tobacco: Never   Substance Use Topics    Alcohol use: No    Drug use: Yes        Review of Systems:  HENT: Negative.     Eyes: Negative.    Respiratory: Negative.     Cardiovascular:  Positive for chest pain. Negative for palpitations, orthopnea, claudication, leg swelling and PND.   Gastrointestinal: Negative.    Genitourinary:  Negative.    Musculoskeletal: Negative.    Skin: Negative.    Neurological: Negative.    Endo/Heme/Allergies: Negative.    Psychiatric/Behavioral: Negative.     All other systems reviewed and are negative.    OBJECTIVE:     Vital Signs (Most Recent):  Temp: 98 °F (36.7 °C) (03/27/24 0642)  Pulse: 82 (03/27/24 0642)  Resp: 18 (03/27/24 0642)  BP: (!) 177/80 (03/27/24 0642)  SpO2: 99 % (03/27/24 0642)    Admission: Weight: 80.5 kg (177 lb 8.2 oz) (03/22/24 1815)   Most Recent: Weight: 71.2 kg (156 lb 15.5 oz) (03/26/24 0607)    Physical Exam:  Vitals and nursing note reviewed.   Constitutional:       Appearance: Normal appearance.   HENT:      Head: Normocephalic.      Mouth/Throat:      Mouth: Mucous membranes are moist.   Eyes:      Extraocular Movements: Extraocular movements intact.      Pupils: Pupils are equal, round, and reactive to light.   Cardiovascular:      Rate and Rhythm: Normal rate and regular rhythm.      Heart sounds: Normal heart sounds.   Pulmonary:      Effort: Pulmonary effort is normal.   Abdominal:      General: Abdomen is flat.      Palpations: Abdomen is soft.   Musculoskeletal:      Cervical back: Neck supple.   Skin:     General: Skin is warm.   Neurological:      General: No focal deficit present.      Mental Status: He is alert and oriented to person, place, and time.   Psychiatric:         Mood and Affect: Mood normal.         Behavior: Behavior normal.     Laboratory:  I have reviewed all pertinent lab results within the past 24 hours.    Diagnostic Results:  ECG: Reviewed  X-Ray: Reviewed  CT: Reviewed  Cardiac Cath: Reviewed  Carotid Duplex: Reviewed      ASSESSMENT/PLAN:     73 y.o. male with HTN admitted following positive stress test for unstable angina. [facility] report pending but he has severe LM disease and ostial RCA disease. Plavix loaded 3/22.    To OR this morning with Dr. Canas for CABG x2, possible MAZE, and left atrial appendage ligation.       Mando Trinidad,  DO  Cardiothoracic Surgery Fellow

## 2024-03-27 NOTE — SUBJECTIVE & OBJECTIVE
Interval HPI:   No acute events overnight. Patient in room 61250 CVICU/19907 CVICU A. Blood glucose stable. BG at goal on current insulin regimen (IIP). Steroid use- None .   Day of Surgery  Renal function- Normal   Vasopressors-  None     Diet NPO Except for: Sips with Medication     Eating:   NPO  Nausea: No  Hypoglycemia and intervention: No  Fever: No  TPN and/or TF: No    PMH, PSH, FH, SH updated and reviewed     ROS:    Review of Systems   Unable to perform ROS: Intubated       Current Medications and/or Treatments Impacting Glycemic Control  Immunotherapy:    Immunosuppressants       None          Steroids:   Hormones (From admission, onward)      Start     Stop Route Frequency Ordered    03/22/24 1411  melatonin tablet 6 mg         -- Oral Nightly PRN 03/22/24 1312          Pressors:    Autonomic Drugs (From admission, onward)      Start     Stop Route Frequency Ordered    03/27/24 1344  NORepinephrine 4 mg in dextrose 5% 250 mL infusion (premix)        Question Answer Comment   Begin at (in mcg/kg/min): 0.02    Titrate by: (in mcg/kg/min (RANGE PREFERRED) 0.02 - 0.2    Titrate interval: (in minutes) (RANGE PREFERRED) 2 - 5    Titrate to maintain: (MAP or SBP) MAP    Titrate to keep MAP within the range or GREATER than (if single number): (in mmHg) OTHER    Other 60    Maximum dose: (in mcg/kg/min) 3        -- IV Continuous 03/27/24 1345          Hyperglycemia/Diabetes Medications:   Antihyperglycemics (From admission, onward)      Start     Stop Route Frequency Ordered    03/27/24 1430  insulin regular in 0.9 % NaCl 100 unit/100 mL (1 unit/mL) infusion        Question Answer Comment   Insulin rate changes (DO NOT MODIFY ANSWER) \\[email or web address]\epic\Images\Pharmacy\InsulinInfusions\CTS INSULIN NY190G.pdf    Enter initial dose (Units/hr): 1        -- IV Continuous 03/27/24 1336             PHYSICAL EXAMINATION:  Vitals:    03/27/24 1400   BP:    Pulse: 89   Resp: 19   Temp: 97.6 °F (36.4 °C)     Body mass index  is 23.18 kg/m².     Physical Exam  HENT:      Head: Normocephalic and atraumatic.   Pulmonary:      Comments: Intubated  Neurological:      Comments: Sedated

## 2024-03-27 NOTE — ANESTHESIA PROCEDURE NOTES
Intubation    Date/Time: 3/27/2024 8:13 AM    Performed by: Lucy Rivera MD  Authorized by: William Gant MD    Intubation:     Induction:  Intravenous    Intubated:  Postinduction    Mask Ventilation:  Easy mask    Attempts:  1    Attempted By:  Staff anesthesiologist    Method of Intubation:  Direct    Blade:  Campuzano 3    Laryngeal View Grade: Grade I - full view of cords      Difficult Airway Encountered?: No      Complications:  None    Airway Device:  Oral endotracheal tube    Airway Device Size:  7.5    Style/Cuff Inflation:  Cuffed (inflated to minimal occlusive pressure)    Placement Verified By:  Capnometry    Complicating Factors:  None    Findings Post-Intubation:  Atraumatic/condition of teeth unchanged and BS equal bilateral

## 2024-03-27 NOTE — ANESTHESIA PROCEDURE NOTES
MARIA C    Diagnosis: CAD  Patient location during procedure: OR  Exam type: Baseline    Staffing  Performed: anesthesiologist and fellow     Anesthesiologist: William Gant MD        Anesthesiologist Present  Yes      Setup & Induction  Patient preparation: bite block inserted  Probe Insertion: easy  Exam: completeDoppler Echo: continuous wave Doppler, 3D, color flow mapping, pulse wave Doppler and 2D.  Exam     Left Heart  Left Atruim: normal    Left Ventricle: cm, normal (men 4.2-5.9; women 3.8-5.2)    LVAD  Estimated Ejection Fraction: > 55% normal  Regional Wall Abnormalities: no RWMA            Right Heart  Right Ventricle: normal  Right Ventricle Function: normal  Right Atria:  normal    Intra Atrial Septum  PFO: no shunt by color flow doppler          Right Ventricle  Size: normal, Free Wall Thickness: normal    Aortic Valve:  Morphology: trileaflet    Regurgitation: mild (2+) aortic regurgitation      Mitral Valve:   Morphology:normal  Jet Description: mild    Tricuspid Valve:  Morphology: normal  Regurgitation: none    Pulmonic Valve:  Morphology:normal  Regurgitation(color flow): none    Great Vessels  Ascending Aorta Atherosclerosis: 1=nl-min dz  Aortic Arch Atherosclerosis: 1=nl-min dz  Descending Aorta Atherosclerosis: 1=nl-min dz      Effusions none    SummaryFindings discussed with surgeon.    Other Findings   PRE  Normal biventricular systolic function, EF 55%  Mildly dilated RV  Mild AI, MR

## 2024-03-27 NOTE — NURSING
Dr. Peterson notified of chest tube output. Right Mediastinal output: 273, Left mediastinal: 140, and L pleural: 150. Levo increased to 0.06. See flowsheets for additional information.

## 2024-03-27 NOTE — ASSESSMENT & PLAN NOTE
Neuro/Psych:     - Sedation: none    - Pain:    - Scheduled Tylenol 1g q8h   -  Oxy PRN             Cardiac:     - S/P CABGx2 (LMA-LAD and SVG-OM) with Dr. Canas on 3/27/24    - BP Goal: [id number] and SBP <140    - Cleviprex PRN    - Pressors: levo 0.06    - Anti-HTNs: Hydralazine PRN    - Rhythm: NSR    - Beta blocker: lopressor 25    - Statin: Atorvastatin 80 mg QD      Pulmonary:     - Goal SpO2 >92%    - Will wean ventilator support as tolerated to extubate    - Chest Tubes x 3 (2 Meds & 1 Pleural)    - ABGs PRN      Renal:    - Trend BUN/Cr   - 500 albumin    - Maintain Dunn, record strict Is/Os    Recent Labs   Lab 03/26/24  0325 03/27/24  0538 03/27/24  1330   BUN 24* 21 18   CREATININE 0.9 1.0 1.0         FEN / GI:     - Daily CMP, PRN K/Mag/Phos per protocol     - Replace electrolytes as needed    - Nutrition NPO sps with meds    - Bowel Regimen: docusate      ID:     - Afebrile    - WBC stable    - Abx: Complete perioperative cefazolin 2g Q8H x 5 doses    Recent Labs   Lab 03/27/24  0538 03/27/24  1216 03/27/24  1330   WBC 7.47 14.37* 15.52*         Heme/Onc:     - Hgb 16.4 pre-operatively    - CBC daily    - ASA 325mg daily    - 1FFP, 1 Plt, 10 cryo     Recent Labs   Lab 03/26/24  1209 03/27/24  0538 03/27/24  1216 03/27/24  1330   HGB  --  16.4 12.0* 11.5*   PLT  --  245 134* 204   APTT  --  44.1* 27.6 27.3   INR 1.0  --  1.2 1.1         Endocrine:     - CTS Goal -140    - HgbA1c:     - Endocrinology consulted for insulin management      PPx:   Feeding: NPO, sips with meds,   Analgesia/Sedation: as above  Thromboembolic Prevention: SCDs  HOB >30: Yes  Stress Ulcer: famotidine  Glucose Control: Yes, insulin management per Endocrinology     Lines/Drains/Airway:     Left radial arterial line   L subclavian CVC   Dunn   Chest Tubes: 4 (2 Mediastinal, 2 Pleural)    Pacing Wires: Temporary ventricular pacing wires      Dispo/Code Status/Palliative:     - SICU    - Full Code

## 2024-03-27 NOTE — PLAN OF CARE
Problem: Adult Inpatient Plan of Care  Goal: Plan of Care Review  3/27/2024 0616 by Jania Liu RN  Outcome: Ongoing, Progressing  3/27/2024 0610 by Jania Liu RN  Outcome: Ongoing, Progressing  Goal: Patient-Specific Goal (Individualized)  3/27/2024 0616 by Jania Liu RN  Outcome: Ongoing, Progressing  3/27/2024 0610 by Jania Liu RN  Outcome: Ongoing, Progressing  Goal: Absence of Hospital-Acquired Illness or Injury  3/27/2024 0616 by Jania Liu RN  Outcome: Ongoing, Progressing  3/27/2024 0610 by Jania Liu RN  Outcome: Ongoing, Progressing  Goal: Optimal Comfort and Wellbeing  3/27/2024 0616 by Jania Liu RN  Outcome: Ongoing, Progressing  3/27/2024 0610 by Jania Liu RN  Outcome: Ongoing, Progressing  Goal: Readiness for Transition of Care  3/27/2024 0616 by Jania Liu RN  Outcome: Ongoing, Progressing  3/27/2024 0610 by Jania Liu RN  Outcome: Ongoing, Progressing     Problem: Infection  Goal: Absence of Infection Signs and Symptoms  3/27/2024 0616 by Jania Liu RN  Outcome: Ongoing, Progressing  3/27/2024 0610 by Jania Liu RN  Outcome: Ongoing, Progressing     Problem: Fall Injury Risk  Goal: Absence of Fall and Fall-Related Injury  3/27/2024 0616 by Jania Liu RN  Outcome: Ongoing, Progressing  3/27/2024 0610 by Jania Liu RN  Outcome: Ongoing, Progressing

## 2024-03-27 NOTE — ANESTHESIA PROCEDURE NOTES
Central Line    Diagnosis: CAD  Patient location during procedure: done in OR    Staffing  Authorizing Provider: William Gant MD  Performing Provider: Lucy Rivera MD    Staffing  Performed: resident/CRNA   Performed by: Lucy Rivera MD  Authorized by: William Gant MD    Anesthesiologist was present at the time of the procedure.  Preanesthetic Checklist  Completed: patient identified, IV checked, site marked, risks and benefits discussed, surgical consent, monitors and equipment checked, pre-op evaluation, timeout performed and anesthesia consent given  Indication   Indication: hemodynamic monitoring, vascular access, med administration     Anesthesia   general anesthesia    Central Line   Skin Prep: skin prepped with ChloraPrep, skin prep agent completely dried prior to procedure  Sterile Barriers Followed: Yes    All five maximal barriers used- gloves, gown, cap, mask, and large sterile sheet    hand hygiene performed prior to central venous catheter insertion  Location: left subclavian.   Catheter type: introducer  Catheter Size: 9 Fr  Ultrasound: vascular probe with ultrasound   Vessel Caliber: medium, patent  Needle advanced into vessel with real time Ultrasound guidance.  Guidewire confirmed in vessel.  sterile gel and probe cover used in ultrasound-guided central venous catheter insertion  Manometry: none (MARIA C used to verify)  Insertion Attempts: 1   Securement:line sutured, chlorhexidine patch, sterile dressing applied and blood return through all ports    Post-Procedure    Adverse Events:none      Guidewire Guidewire removed intact. Guidewire removed intact, verified with nurse.

## 2024-03-27 NOTE — OP NOTE
DATE OF PROCEDURE:  3/27/2024     PREOPERATIVE DIAGNOSES:  1.  Severe left main disease of the coronary artery  2. Left main coronary artery thrombosis   3. Positive cardiac stress test  4. Proximal atrial fibrillation with rapid ventricular response    5. GERD   6. Benign essential hypertension  7. On anticoagulation-heparin drip      POSTOPERATIVE DIAGNOSES: SAME       OPERATIONS:  1.  Urgent Coronary artery bypass grafting x 2 (LIMA-LAD and SVG to OM1).  2. Resection of left atrial appendage  3.  Takedown of the left internal mammary artery, skeletonized technique.  4. Endoscopic vein harvest from Left Lower Extremity       STAFF SURGEON:  Tony Canas M.D.     FIRST ASSISTANT:  Ngozi saldivar     ANESTHESIA:  GETA.     ESTIMATED BLOOD LOSS:  200 mL.     KEY FINDINGS OF THE OPERATION:  1.  Good quality left internal mammary artery.  2.  Preserved ejection fraction  3. Resection of the left atrial appendage carried out as patient was having proximal atrial fibrillation and we discussed it preoperatively for removal of the appendix to decrease the chances of embolic stroke.        INDICATION OF OPERATION:  This is a 73-year-old male who having positive stress test underwent left heart catheterization and was found to have left main disease.  He had 95-99% left main disease.  Patient was admitted and was already loaded with Plavix.  Cardiothoracic surgery was consulted and complete workup was done.  Patient was a reasonable candidate for surgical revascularization.  After understanding risks, benefits and treatment options patient wanted to proceed with surgical revascularization.  An informed consent was obtained.  This is an urgent case as patient can not be discharged from the hospital due to tight left main disease and was started on already anticoagulation to prevent any chances of having periprocedural MI.         PROCEDURE IN OPERATION:  The patient was brought to the Operating Room and placed in a supine  position.  After induction of anesthesia, the area was prepped and draped in the usual sterile fashion.  An upper midline incision was made, which was carried all the way down to the sternum.  Median sternotomy was then performed.  Sternal edges were cauterized.  Chest retractor was placed.  Pericardium was then opened.  Once that was done, target vessel was identified.  Target vessel was found to be a decent vessel for bypass.  The left hemisternum was elevated using a Rultract retractor.  The left internal mammary artery was taken down in a skeletonized fashion.  Simultaneously, endoscopic vein harvest was started from the left lower extremity.     Systemic heparinization was done where an ACT greater than 450 was obtained.  The distal portion of the left internal mammary artery was ligated and cut.  It was prepared with papaverine to remove any vasospasm.  Arterial canula was placed in the ascending aorta and venous cannula ws placed in the right atrium. Retrograde cardioplegia catheter was placed in the coronary sinus, followed by antegrade cardioplegia catheter in the proximal ascending aorta. Patient was placed on CPB. This was followed by application of cross clamp to ascending aorta. Using antegrade and retrograde cardioplegia a cardiac standstill was obtained. The target vessels were identified. The mid portion of the OM1 was found to be an adequate target site and an arteriotomy was made. This was followed by spatulation of the SVG. Using a 7-0 Prolene a continuous running anastomosis was obtained. A dose of cardioplegia was administered and a flow of 60 cc/min was noted at 120 mmHg pressure.  At this stage then left atrial appendage was identified and a 45 mm stapler was applied to the base of the appendage.  MARIA C examination had already shown there was no clot in the appendage.  The appendage was then resected and submitted for pathology.  To ensure no bleeding occurs a pledgetted 4-0 Prolene suture was  run in 2 layers as another layer to prevent chances of bleeding.  Good hemostasis was ensured.  Then attention was directed to the mid portion of the LAD. An arteriotomy was made and then using a 7-0 Prolene stitch, a continuous running anastomosis of the left internal mammary artery to the LAD was carried out.  Good hemostasis was ensured.  Another dose of cardioplegia was administered followed by preparation of the proximal vein anastomotic site to the ascending aorta using a 4 mm aortic punch. Using a 5-0 prolene suture a continuous running hemostatic anastomosis was created. This was followed by a hot shot and aortic cross clamp was then removed. Instant cardiac activity was noted. All electrolytes were found to be WNL. Ventilation was resumed and gradually the patient was weaned from CPB after ensuring absence of any intracardiac air on MARIA C examination.      A test dose of protamine was given followed by full dose of protamine to reverse the effects of systemic heparin.  Temporary ventricular pacing wires were placed on the surface and brought through separateskin incision.  Two mediastinal and one left pleural chest tube was placed and brought through separate skin incision and connected to Pleur-evac.  Sternum was then approximated by #6 stainless steel wire.   Terminal count of needles, sponges, and instrument was found to be correct.     MEDICARE ATTESTATION:  Due to unavailability of any adequately trained cardiothoracic resident, I performed all parts of the operation.

## 2024-03-27 NOTE — SUBJECTIVE & OBJECTIVE
Follow-up For: Procedure(s) (LRB):  CORONARY ARTERY BYPASS GRAFT (CABG) (N/A)  HARVEST-VEIN-ENDOVASCULAR (Left)    Post-Operative Day: Day of Surgery     History reviewed. No pertinent past medical history.    Past Surgical History:   Procedure Laterality Date    CORONARY ANGIOGRAPHY N/A 3/22/2024    Procedure: ANGIOGRAM, CORONARY ARTERY;  Surgeon: Yumi Bal MD;  Location: [facility] CATH LAB;  Service: Cardiology;  Laterality: N/A;    LEFT HEART CATHETERIZATION Left 3/22/2024    Procedure: Left heart cath;  Surgeon: Yumi Bal MD;  Location: [facility] CATH LAB;  Service: Cardiology;  Laterality: Left;       Review of patient's allergies indicates:  No Known Allergies    Family History       Problem Relation (Age of Onset)    Cancer Mother, Sister    Hypertension Mother, Sister          Tobacco Use    Smoking status: Never    Smokeless tobacco: Never   Substance and Sexual Activity    Alcohol use: No    Drug use: Yes    Sexual activity: Yes     Partners: Female      Review of Systems   Unable to perform ROS: Intubated     Objective:     Vital Signs (Most Recent):  Temp: 98 °F (36.7 °C) (03/27/24 0642)  Pulse: 82 (03/27/24 0642)  Resp: 18 (03/27/24 0642)  BP: (!) 177/80 (03/27/24 0642)  SpO2: 99 % (03/27/24 0642) Vital Signs (24h Range):  Temp:  [97.5 °F (36.4 °C)-98.5 °F (36.9 °C)] 98 °F (36.7 °C)  Pulse:  [65-82] 82  Resp:  [18] 18  SpO2:  [97 %-99 %] 99 %  BP: (136-177)/(62-80) 177/80     Weight: 71.2 kg (156 lb 15.5 oz)  Body mass index is 23.18 kg/m².      Intake/Output Summary (Last 24 hours) at 3/27/2024 1220  Last data filed at 3/27/2024 1153  Gross per 24 hour   Intake 1451.12 ml   Output 1150 ml   Net 301.12 ml          Physical Exam  Constitutional:       Interventions: He is sedated and intubated.   HENT:      Mouth/Throat:      Mouth: Mucous membranes are dry.      Pharynx: Oropharynx is clear.   Eyes:      Extraocular Movements: Extraocular movements intact.      Pupils: Pupils are equal, round, and  reactive to light.   Cardiovascular:      Rate and Rhythm: Normal rate. Rhythm irregular.      Pulses: Normal pulses.      Comments: Left radial arterial line  Ventricular pacing wires   Pulmonary:      Effort: No respiratory distress. He is intubated.      Comments: 2 Mediastinal and 1 pleural chest tube  Abdominal:      General: Abdomen is flat.      Palpations: Abdomen is soft.   Genitourinary:     Comments: Dunn in place   Musculoskeletal:      Cervical back: Neck supple.      Right lower leg: No edema.      Left lower leg: No edema.   Skin:     General: Skin is warm and dry.      Capillary Refill: Capillary refill takes less than 2 seconds.      Comments: MSI CDI            Vents:       Lines/Drains/Airways       Central Venous Catheter Line  Duration             Introducer 03/27/24 0842 <1 day              Drain  Duration                  Urethral Catheter 03/27/24 0823 Non-latex;Straight-tip;Temperature probe 16 Fr. <1 day              Airway  Duration                  Airway - Non-Surgical 03/27/24 0813 <1 day              Arterial Line  Duration             Arterial Line 03/27/24 0840 Left Radial <1 day              Peripheral Intravenous Line  Duration                  Peripheral IV - Single Lumen 03/22/24 1300 18 G Left Antecubital 4 days         Peripheral IV - Single Lumen 03/23/24 1430 18 G Anterior;Right Forearm 3 days         Peripheral IV - Single Lumen 03/27/24 0818 14 G  Left Upper Arm <1 day                    Significant Labs:    CBC/Anemia Profile:  Recent Labs   Lab 03/26/24  0325 03/27/24  0538 03/27/24  1039 03/27/24  1108 03/27/24  1139   WBC 7.51 7.47  --   --   --    HGB 15.2 16.4  --   --   --    HCT 45.7 50.6 33* 33* 34*    245  --   --   --    MCV 90 92  --   --   --    RDW 12.6 12.6  --   --   --         Chemistries:  Recent Labs   Lab 03/26/24  0325 03/27/24  0538    138   K 4.1 3.8    106   CO2 21* 23   BUN 24* 21   CREATININE 0.9 1.0   CALCIUM 9.0 9.6   ALBUMIN  3.5 4.0   PROT 6.0 7.4   BILITOT 0.6 0.7   ALKPHOS 35* 42*   ALT 20 45*   AST 17 38   MG 2.1 2.2   PHOS 3.5 2.7       All pertinent labs within the past 24 hours have been reviewed.    Significant Imaging: I have reviewed all pertinent imaging results/findings within the past 24 hours.

## 2024-03-27 NOTE — PLAN OF CARE
SICU PLAN OF CARE NOTE    Dx: Left main coronary artery thrombosis    Shift Events: 1.5L albumin given, 2 units of cryo given, levo weaned off, Dr. Canas updated on all labs, chest tube output, and urine output    Goals of Care:MAP 70-85, systolic< 140    Neuro: Awake and alert, disoriented to situation    Cardiac: NSR    Respiratory:room air    Diet: NPO sips w/ meds    Gtts: Insulin    Urine Output: 1890 cc/shift    Drains:  Left pleural Chest tube, Right and Left mediastinal chest tubes: see chart for output    Skin: No new skin breakdown, turn q2hr, bed plugged in and wheels locked, midsternal incision w/ hydrocolloid, chest tube dressing-CDI, see flowsheets for full assessment

## 2024-03-27 NOTE — ASSESSMENT & PLAN NOTE
Home antihypertensives include Valsartan. Will start when clinically appropriate.     - MAP >65 and SBP <140  - Cardene PRN  - Restart home medication when appropriate

## 2024-03-28 LAB
ALBUMIN SERPL BCP-MCNC: 4 G/DL (ref 3.5–5.2)
ALBUMIN SERPL BCP-MCNC: 4.1 G/DL (ref 3.5–5.2)
ALBUMIN SERPL BCP-MCNC: 4.1 G/DL (ref 3.5–5.2)
ALLENS TEST: NORMAL
ALP SERPL-CCNC: 26 U/L (ref 55–135)
ALP SERPL-CCNC: 30 U/L (ref 55–135)
ALP SERPL-CCNC: 31 U/L (ref 55–135)
ALT SERPL W/O P-5'-P-CCNC: 24 U/L (ref 10–44)
ALT SERPL W/O P-5'-P-CCNC: 26 U/L (ref 10–44)
ALT SERPL W/O P-5'-P-CCNC: 30 U/L (ref 10–44)
ANION GAP SERPL CALC-SCNC: 13 MMOL/L (ref 8–16)
ANION GAP SERPL CALC-SCNC: 9 MMOL/L (ref 8–16)
ANION GAP SERPL CALC-SCNC: 9 MMOL/L (ref 8–16)
APTT PPP: 28.9 SEC (ref 21–32)
AST SERPL-CCNC: 37 U/L (ref 10–40)
AST SERPL-CCNC: 41 U/L (ref 10–40)
AST SERPL-CCNC: 45 U/L (ref 10–40)
BASOPHILS # BLD AUTO: 0.01 K/UL (ref 0–0.2)
BASOPHILS NFR BLD: 0.1 % (ref 0–1.9)
BILIRUB SERPL-MCNC: 0.6 MG/DL (ref 0.1–1)
BILIRUB SERPL-MCNC: 0.7 MG/DL (ref 0.1–1)
BILIRUB SERPL-MCNC: 0.8 MG/DL (ref 0.1–1)
BLD PROD TYP BPU: NORMAL
BLD PROD TYP BPU: NORMAL
BLOOD UNIT EXPIRATION DATE: NORMAL
BLOOD UNIT EXPIRATION DATE: NORMAL
BLOOD UNIT TYPE CODE: 6200
BLOOD UNIT TYPE CODE: 6200
BLOOD UNIT TYPE: NORMAL
BLOOD UNIT TYPE: NORMAL
BUN SERPL-MCNC: 19 MG/DL (ref 8–23)
BUN SERPL-MCNC: 19 MG/DL (ref 8–23)
BUN SERPL-MCNC: 22 MG/DL (ref 8–23)
CALCIUM SERPL-MCNC: 8.5 MG/DL (ref 8.7–10.5)
CALCIUM SERPL-MCNC: 8.7 MG/DL (ref 8.7–10.5)
CALCIUM SERPL-MCNC: 8.8 MG/DL (ref 8.7–10.5)
CHLORIDE SERPL-SCNC: 106 MMOL/L (ref 95–110)
CHLORIDE SERPL-SCNC: 107 MMOL/L (ref 95–110)
CHLORIDE SERPL-SCNC: 107 MMOL/L (ref 95–110)
CO2 SERPL-SCNC: 19 MMOL/L (ref 23–29)
CO2 SERPL-SCNC: 22 MMOL/L (ref 23–29)
CO2 SERPL-SCNC: 23 MMOL/L (ref 23–29)
CODING SYSTEM: NORMAL
CODING SYSTEM: NORMAL
CREAT SERPL-MCNC: 0.9 MG/DL (ref 0.5–1.4)
CREAT SERPL-MCNC: 1.2 MG/DL (ref 0.5–1.4)
CREAT SERPL-MCNC: 1.3 MG/DL (ref 0.5–1.4)
CROSSMATCH INTERPRETATION: NORMAL
CROSSMATCH INTERPRETATION: NORMAL
DIFFERENTIAL METHOD BLD: ABNORMAL
DISPENSE STATUS: NORMAL
DISPENSE STATUS: NORMAL
EOSINOPHIL # BLD AUTO: 0 K/UL (ref 0–0.5)
EOSINOPHIL NFR BLD: 0 % (ref 0–8)
ERYTHROCYTE [DISTWIDTH] IN BLOOD BY AUTOMATED COUNT: 12.8 % (ref 11.5–14.5)
EST. GFR  (NO RACE VARIABLE): 57.3 ML/MIN/1.73 M^2
EST. GFR  (NO RACE VARIABLE): >60 ML/MIN/1.73 M^2
EST. GFR  (NO RACE VARIABLE): >60 ML/MIN/1.73 M^2
GLUCOSE SERPL-MCNC: 117 MG/DL (ref 70–110)
GLUCOSE SERPL-MCNC: 126 MG/DL (ref 70–110)
GLUCOSE SERPL-MCNC: 129 MG/DL (ref 70–110)
HCT VFR BLD AUTO: 27.6 % (ref 40–54)
HGB BLD-MCNC: 9.2 G/DL (ref 14–18)
IMM GRANULOCYTES # BLD AUTO: 0.02 K/UL (ref 0–0.04)
IMM GRANULOCYTES NFR BLD AUTO: 0.2 % (ref 0–0.5)
INR PPP: 1 (ref 0.8–1.2)
LDH SERPL L TO P-CCNC: 1.25 MMOL/L (ref 0.36–1.25)
LYMPHOCYTES # BLD AUTO: 0.5 K/UL (ref 1–4.8)
LYMPHOCYTES NFR BLD: 3.8 % (ref 18–48)
MAGNESIUM SERPL-MCNC: 2.4 MG/DL (ref 1.6–2.6)
MCH RBC QN AUTO: 30.2 PG (ref 27–31)
MCHC RBC AUTO-ENTMCNC: 33.3 G/DL (ref 32–36)
MCV RBC AUTO: 91 FL (ref 82–98)
MONOCYTES # BLD AUTO: 1.1 K/UL (ref 0.3–1)
MONOCYTES NFR BLD: 9 % (ref 4–15)
NEUTROPHILS # BLD AUTO: 10.4 K/UL (ref 1.8–7.7)
NEUTROPHILS NFR BLD: 86.9 % (ref 38–73)
NRBC BLD-RTO: 0 /100 WBC
NUM UNITS TRANS FFP: NORMAL
NUM UNITS TRANS FFP: NORMAL
OHS QRS DURATION: 126 MS
OHS QTC CALCULATION: 479 MS
PHOSPHATE SERPL-MCNC: 3.3 MG/DL (ref 2.7–4.5)
PLATELET # BLD AUTO: 170 K/UL (ref 150–450)
PMV BLD AUTO: 11.7 FL (ref 9.2–12.9)
POCT GLUCOSE: 101 MG/DL (ref 70–110)
POCT GLUCOSE: 102 MG/DL (ref 70–110)
POCT GLUCOSE: 112 MG/DL (ref 70–110)
POCT GLUCOSE: 114 MG/DL (ref 70–110)
POCT GLUCOSE: 124 MG/DL (ref 70–110)
POCT GLUCOSE: 126 MG/DL (ref 70–110)
POCT GLUCOSE: 126 MG/DL (ref 70–110)
POCT GLUCOSE: 127 MG/DL (ref 70–110)
POCT GLUCOSE: 129 MG/DL (ref 70–110)
POCT GLUCOSE: 131 MG/DL (ref 70–110)
POCT GLUCOSE: 143 MG/DL (ref 70–110)
POCT GLUCOSE: 88 MG/DL (ref 70–110)
POTASSIUM SERPL-SCNC: 4 MMOL/L (ref 3.5–5.1)
POTASSIUM SERPL-SCNC: 4.1 MMOL/L (ref 3.5–5.1)
POTASSIUM SERPL-SCNC: 4.1 MMOL/L (ref 3.5–5.1)
PROT SERPL-MCNC: 6.3 G/DL (ref 6–8.4)
PROT SERPL-MCNC: 6.5 G/DL (ref 6–8.4)
PROT SERPL-MCNC: 6.5 G/DL (ref 6–8.4)
PROTHROMBIN TIME: 11 SEC (ref 9–12.5)
RBC # BLD AUTO: 3.05 M/UL (ref 4.6–6.2)
SAMPLE: NORMAL
SITE: NORMAL
SODIUM SERPL-SCNC: 138 MMOL/L (ref 136–145)
SODIUM SERPL-SCNC: 138 MMOL/L (ref 136–145)
SODIUM SERPL-SCNC: 139 MMOL/L (ref 136–145)
WBC # BLD AUTO: 11.9 K/UL (ref 3.9–12.7)

## 2024-03-28 PROCEDURE — 25000003 PHARM REV CODE 250: Performed by: INTERNAL MEDICINE

## 2024-03-28 PROCEDURE — 25000003 PHARM REV CODE 250: Performed by: STUDENT IN AN ORGANIZED HEALTH CARE EDUCATION/TRAINING PROGRAM

## 2024-03-28 PROCEDURE — 63600175 PHARM REV CODE 636 W HCPCS

## 2024-03-28 PROCEDURE — 97162 PT EVAL MOD COMPLEX 30 MIN: CPT

## 2024-03-28 PROCEDURE — 94761 N-INVAS EAR/PLS OXIMETRY MLT: CPT

## 2024-03-28 PROCEDURE — 93010 ELECTROCARDIOGRAM REPORT: CPT | Mod: ,,, | Performed by: INTERNAL MEDICINE

## 2024-03-28 PROCEDURE — 80053 COMPREHEN METABOLIC PANEL: CPT | Mod: 91

## 2024-03-28 PROCEDURE — 85730 THROMBOPLASTIN TIME PARTIAL: CPT | Performed by: STUDENT IN AN ORGANIZED HEALTH CARE EDUCATION/TRAINING PROGRAM

## 2024-03-28 PROCEDURE — 85025 COMPLETE CBC W/AUTO DIFF WBC: CPT | Performed by: STUDENT IN AN ORGANIZED HEALTH CARE EDUCATION/TRAINING PROGRAM

## 2024-03-28 PROCEDURE — 25000003 PHARM REV CODE 250

## 2024-03-28 PROCEDURE — 83735 ASSAY OF MAGNESIUM: CPT | Performed by: STUDENT IN AN ORGANIZED HEALTH CARE EDUCATION/TRAINING PROGRAM

## 2024-03-28 PROCEDURE — 97535 SELF CARE MNGMENT TRAINING: CPT

## 2024-03-28 PROCEDURE — 93005 ELECTROCARDIOGRAM TRACING: CPT

## 2024-03-28 PROCEDURE — 85610 PROTHROMBIN TIME: CPT | Performed by: INTERNAL MEDICINE

## 2024-03-28 PROCEDURE — 80053 COMPREHEN METABOLIC PANEL: CPT | Performed by: STUDENT IN AN ORGANIZED HEALTH CARE EDUCATION/TRAINING PROGRAM

## 2024-03-28 PROCEDURE — 99291 CRITICAL CARE FIRST HOUR: CPT | Mod: ,,, | Performed by: ANESTHESIOLOGY

## 2024-03-28 PROCEDURE — 97165 OT EVAL LOW COMPLEX 30 MIN: CPT

## 2024-03-28 PROCEDURE — 20000000 HC ICU ROOM

## 2024-03-28 PROCEDURE — 84100 ASSAY OF PHOSPHORUS: CPT | Performed by: STUDENT IN AN ORGANIZED HEALTH CARE EDUCATION/TRAINING PROGRAM

## 2024-03-28 PROCEDURE — 97116 GAIT TRAINING THERAPY: CPT

## 2024-03-28 RX ORDER — INSULIN ASPART 100 [IU]/ML
0-10 INJECTION, SOLUTION INTRAVENOUS; SUBCUTANEOUS
Status: DISCONTINUED | OUTPATIENT
Start: 2024-03-28 | End: 2024-03-30

## 2024-03-28 RX ORDER — IBUPROFEN 200 MG
24 TABLET ORAL
Status: DISCONTINUED | OUTPATIENT
Start: 2024-03-28 | End: 2024-03-30

## 2024-03-28 RX ORDER — FUROSEMIDE 10 MG/ML
10 INJECTION INTRAMUSCULAR; INTRAVENOUS 2 TIMES DAILY
Status: DISCONTINUED | OUTPATIENT
Start: 2024-03-28 | End: 2024-03-28

## 2024-03-28 RX ORDER — QUETIAPINE FUMARATE 25 MG/1
25 TABLET, FILM COATED ORAL NIGHTLY PRN
Status: DISCONTINUED | OUTPATIENT
Start: 2024-03-28 | End: 2024-03-31 | Stop reason: HOSPADM

## 2024-03-28 RX ORDER — HYDROXYZINE HYDROCHLORIDE 25 MG/1
25 TABLET, FILM COATED ORAL ONCE
Status: DISCONTINUED | OUTPATIENT
Start: 2024-03-28 | End: 2024-03-28

## 2024-03-28 RX ORDER — FAMOTIDINE 10 MG/ML
20 INJECTION INTRAVENOUS DAILY
Status: DISCONTINUED | OUTPATIENT
Start: 2024-03-29 | End: 2024-03-30

## 2024-03-28 RX ORDER — POLYETHYLENE GLYCOL 3350 17 G/17G
17 POWDER, FOR SOLUTION ORAL DAILY
Status: DISCONTINUED | OUTPATIENT
Start: 2024-03-28 | End: 2024-03-31 | Stop reason: HOSPADM

## 2024-03-28 RX ORDER — FUROSEMIDE 10 MG/ML
10 INJECTION INTRAMUSCULAR; INTRAVENOUS ONCE
Status: COMPLETED | OUTPATIENT
Start: 2024-03-28 | End: 2024-03-28

## 2024-03-28 RX ORDER — METOPROLOL TARTRATE 25 MG/1
25 TABLET, FILM COATED ORAL 2 TIMES DAILY
Status: DISCONTINUED | OUTPATIENT
Start: 2024-03-28 | End: 2024-03-30

## 2024-03-28 RX ORDER — HYDROXYZINE HYDROCHLORIDE 25 MG/1
25 TABLET, FILM COATED ORAL ONCE
Status: COMPLETED | OUTPATIENT
Start: 2024-03-28 | End: 2024-03-28

## 2024-03-28 RX ORDER — IBUPROFEN 200 MG
16 TABLET ORAL
Status: DISCONTINUED | OUTPATIENT
Start: 2024-03-28 | End: 2024-03-30

## 2024-03-28 RX ORDER — GLUCAGON 1 MG
1 KIT INJECTION
Status: DISCONTINUED | OUTPATIENT
Start: 2024-03-28 | End: 2024-03-30

## 2024-03-28 RX ADMIN — FUROSEMIDE 10 MG: 10 INJECTION, SOLUTION INTRAVENOUS at 09:03

## 2024-03-28 RX ADMIN — METOPROLOL TARTRATE 25 MG: 25 TABLET, FILM COATED ORAL at 09:03

## 2024-03-28 RX ADMIN — MUPIROCIN: 20 OINTMENT TOPICAL at 09:03

## 2024-03-28 RX ADMIN — ATORVASTATIN CALCIUM 80 MG: 40 TABLET, FILM COATED ORAL at 09:03

## 2024-03-28 RX ADMIN — HYDROXYZINE HYDROCHLORIDE 25 MG: 25 TABLET, FILM COATED ORAL at 01:03

## 2024-03-28 RX ADMIN — CEFAZOLIN 2 G: 2 INJECTION, POWDER, FOR SOLUTION INTRAMUSCULAR; INTRAVENOUS at 09:03

## 2024-03-28 RX ADMIN — ACETAMINOPHEN 1000 MG: 500 TABLET ORAL at 09:03

## 2024-03-28 RX ADMIN — DOCUSATE SODIUM 100 MG: 100 CAPSULE, LIQUID FILLED ORAL at 09:03

## 2024-03-28 RX ADMIN — FAMOTIDINE 20 MG: 10 INJECTION, SOLUTION INTRAVENOUS at 09:03

## 2024-03-28 RX ADMIN — CEFAZOLIN 2 G: 2 INJECTION, POWDER, FOR SOLUTION INTRAMUSCULAR; INTRAVENOUS at 12:03

## 2024-03-28 RX ADMIN — POLYETHYLENE GLYCOL 3350 17 G: 17 POWDER, FOR SOLUTION ORAL at 11:03

## 2024-03-28 NOTE — PLAN OF CARE
Problem: Occupational Therapy  Goal: Occupational Therapy Goal  Description: Goals to be met by: 4/18/2024     Patient will increase functional independence with ADLs by performing:    UE Dressing with Set-up Assistance.  LE Dressing with Supervision.  Grooming while standing at sink with Modified [place].  Toileting from toilet with Modified [place] for hygiene and clothing management.   Supine to sit with Supervision.  Stand pivot transfers with Modified [place].  Toilet transfer to toilet with Modified [place].    Outcome: Ongoing, Progressing   Patient's goals are set.

## 2024-03-28 NOTE — NURSING
Dr. Canas updated on VSS, labs, gtts, I&O, and most recent ABG. Orders received for 10mg IVP lasix.

## 2024-03-28 NOTE — CONSULTS
[facility] - Surgical Intensive Care  Adult Nutrition  Consult Note    SUMMARY     Recommendations    1.) Recommend if and when medically feasible to ADAT, with goal of Cardiac diet, fluid per MD.     2.) If diet advances, recommend Boost Plus (or Boost alternate) BID to help meet increased needs.     3.) RD to monitor wt, PO intake, skin, labs.       Goals: to meet % of EEN/EPN by next RD f/u  Nutrition Goal Status: new  Communication of RD Recs:  (POC)    Assessment and Plan    Nutrition Problem  Increased PRO needs    Related to (etiology):   Increased physiological needs    Signs and Symptoms (as evidenced by):   S/p CABG     Interventions/Recommendations (treatment strategy):  Collaboration of nutritional care with other providers.   ONS PRN  Low Na and Heart Healthy diet edu provided on 3/28    Nutrition Diagnosis Status:   New     Reason for Assessment    Reason For Assessment: consult  Diagnosis: cardiac disease (Left main coronary artery thrombosis; s/p CABG)  Relevant Medical History: HTN, GERD  Interdisciplinary Rounds: did not attend    General Information Comments: RD consulted for s/p CABG. Pt with slight AMS, but was johny to answer most of RD's questions. Pt denies n/v/d/c. Noted that pt had episodes of emesis last night. No caregiver was present in the room. RD left Low Na and Heart Healthy diet edu at bedside, with RD's contact info. NFPE not appropriate at this time- RD to perform next f/u if medically warranted. RD team to continue to monitor.     Nutrition Discharge Planning: Cardiac diet; Low Na and Heart Healthy diet edu provided on 3/28    Nutrition Risk Screen    Nutrition Risk Screen: no indicators present    Nutrition/Diet History    Patient Reported Diet/Restrictions/Preferences: general  Typical Food/Fluid Intake: 2-3 meals per day  Spiritual, Cultural Beliefs, [employer] Practices, Values that Affect Care: no    Anthropometrics    Temp: 98.4 °F (36.9 °C)  Height Method:  "Stated  Height: 5' 9" (175.3 cm)  Height (inches): 69 in  Weight Method: Standard Scale  Weight: 71.2 kg (156 lb 15.5 oz)  Weight (lb): 156.97 lb  Ideal Body Weight (IBW), Male: 160 lb  % Ideal Body Weight, Male (lb): 98.38 %  BMI (Calculated): 23.2    Lab/Procedures/Meds    Pertinent Labs Reviewed: reviewed  Pertinent Labs Comments: Gluc: 117, Ca: 8.5, ALP: 26, AST: 45    Pertinent Medications Reviewed: reviewed  Pertinent Medications Comments: Statin, docusate, lasix, lopressor, insulin    Estimated/Assessed Needs    Weight Used For Calorie Calculations: 71.2 kg (156 lb 15.5 oz)  Energy Calorie Requirements (kcal): 1798 kcal  Energy Need Method: Amawalk-St Jeor (MSJ x 1.25)    Protein Requirements: 86- 107 g (1.2-1.5g/kg)  Weight Used For Protein Calculations: 71.2 kg (156 lb 15.5 oz)    Fluid Requirements (mL): 1ml/1kcal or per MD  Estimated Fluid Requirement Method: RDA Method  RDA Method (mL): 1798    Nutrition Prescription Ordered    Current Diet Order: NPO    Evaluation of Received Nutrient/Fluid Intake    I/O: +313 ml since admit  Energy Calories Required: not meeting needs  Protein Required: not meeting needs  Fluid Required:  (as per MD)  Comments: LBM 3/25  Tolerance:  (NPO)  % Intake of Estimated Energy Needs: 0 - 25 %  % Meal Intake: NPO    Nutrition Risk    Level of Risk/Frequency of Follow-up:  (RD to f/u x 1-2/week)     Monitor and Evaluation    Food and Nutrient Intake: energy intake, food and beverage intake  Food and Nutrient Adminstration: diet order  Knowledge/Beliefs/Attitudes: food and nutrition knowledge/skill, beliefs and attitudes  Physical Activity and Function: nutrition-related ADLs and IADLs  Anthropometric Measurements: weight, weight change, body mass index  Biochemical Data, Medical Tests and Procedures: electrolyte and renal panel, gastrointestinal profile, glucose/endocrine profile, inflammatory profile, lipid profile  Nutrition-Focused Physical Findings: overall appearance, skin "     Nutrition Follow-Up    RD Follow-up?: Yes

## 2024-03-28 NOTE — PT/OT/SLP EVAL
Physical Therapy Evaluation    Patient Name:  Gerald Wilkinson   MRN:  [id number]  Admit Date: 3/22/2024  Admitting Diagnosis:  Left main coronary artery thrombosis  Length of Stay: 6 days  Recent Surgery: Procedure(s) (LRB):  CORONARY ARTERY BYPASS GRAFT (CABG) (N/A)  HARVEST-VEIN-ENDOVASCULAR (Left) 1 Day Post-Op    Recommendations:     Discharge Recommendations:  No Therapy Indicated   Discharge Equipment Recommendations: none     Assessment:     Gerald Wilkinson is a 73 y.o. male admitted with a medical diagnosis of Left main coronary artery thrombosis.  Pt found sitting in chair. Vitals were stable. RN gave consent to treat. LE MMT WNL. Pt was able to ambulate in to bathroom sink and in room with SBA. PT plan to continue to work on cardiopulmonary endurance and gait stability next session.     Problem List: impaired endurance, impaired functional mobility, gait instability  Rehab Prognosis: Good; patient would benefit from acute skilled PT services to address these deficits and reach maximum level of function.      Plan:     During this hospitalization, patient to be seen 5 x/week to address the identified rehab impairments via therapeutic activities, gait training, therapeutic exercises, neuromuscular re-education and progress towards the established goals.    Plan of Care Expires:  04/27/24    Subjective   Communicated with RN prior to session.  Patient found up in chair upon PT entry to room, agreeable to evaluation. Gerald Wilkinson's alone present during session. Wife arrived towards the end of session.     Chief Complaint: Chest Pain (Pt arrives from [facility] where he was undergoing a stress test. He developed chest pain and received 2 sprays of NTG prior to EMS arrival. )    Patient/Family Comments/goals: Patient wishes to be discharged home.   Pain/Comfort:  Pain Rating 1: 0/10  Pain Rating Post-Intervention 1: 0/10    Living Environment:  Patient lives with wife in a single family home with 5  "DELICIA the front door and 5 steps to enter the back door.   Prior Level of Function:   Patient reports being independent with mobility & with ADLs. Hand Dominance: right. Patient uses DME as follows: none. DME owned (not currently used): none.  Roles/Repsonsibilities:   Work: Retired. Drive: yes. Managing Medicines/Managing Home: yes. Hobbies: golfing and walking the dog.    Patient reports they will have assistance from wife upon discharge if needed.    Objective:   Patient found with: central line, peripheral IV, telemetry, pulse ox (continuous), blood pressure cuff, chest tube, valdovinos catheter     General Precautions: Standard, Cardiac fall, sternal   Orthopedic Precautions:    Braces: N/A   Oxygen Device: Room Air  Vitals: /72 (BP Location: Right arm, Patient Position: Lying)   Pulse 96   Temp 98.4 °F (36.9 °C) (Oral)   Resp (!) 31   Ht 5' 9" (1.753 m)   Wt 71.2 kg (156 lb 15.5 oz)   SpO2 96%   BMI 23.18 kg/m²     Exams:  Cognition:   Oriented X 4 and Alert  Command following: Follows multistep  commands  Fluency: clear/fluent  Hearing: Intact  Vision:  Intact visual fields    RLE ROM: WNL  RLE Strength: WNL  LLE ROM: WNL  LLE Strength: WNL    Outcome Measures:  AM-PAC 6 CLICK MOBILITY  Turning over in bed (including adjusting bedclothes, sheets and blankets)?: 3  Sitting down on and standing up from a chair with arms (e.g., wheelchair, bedside commode, etc.): 3  Moving from lying on back to sitting on the side of the bed?: 3  Moving to and from a bed to a chair (including a wheelchair)?: 3  Need to walk in hospital room?: 3  Climbing 3-5 steps with a railing?: 3  Basic Mobility Total Score: 18     Functional Mobility:  Additional staff present: OT, Student PT, and Supervising PT  Bed Mobility:  Bed mobility not assessed due to pt being found/returned to sitting in the chair.     Sitting Balance at Edge of Bed:  Bed mobility not assessed due to pt being found/returned to sitting in the chair. "     Standing Balance:  Pt required: SBA   Duration: ~3 minutes   PT used: No AD \  Comments: Pt performed ADLS with OT at the bathroom sink.     Transfers:   Sit <> Stand Transfer form bedside chair: stand by assistance with no assistive device   Comments: Pt required cueing to bend at the hips to anteriorly WS to stand. Pt required verbal cueing of sternal precautions   Gait:   Patient ambulated: ~10ft + 24ft   Pt performed ADLS at sink with OT.    Patient required: standy by assistance  Patient used: no assistive device  Gait Pattern observed: reciprocal gait  Gait Deviation(s): decreased arm swing and flexed posture  Impairments due to: decreased endurance  Comments: Pt required cueing to stand upright.       Therapeutic Activities, Exercises, & Education:   Pt educated on PT role/POC.  PT educated on sternal precautions.   Pt verbalized understanding.       Gait training:  Pt was able to ambulate in room to work on cardiopulmonary endurance and gait stability.     Patient left up in chair with all lines intact, call button in reach, and RN notified.    GOALS:   Multidisciplinary Problems       Physical Therapy Goals          Problem: Physical Therapy    Goal Priority Disciplines Outcome Goal Variances Interventions   Physical Therapy Goal     PT, PT/OT Ongoing, Progressing     Description: Goals to be met by: 2024     Patient will increase functional independence with mobility by performin. Supine to sit with Stand-by Assistance  2. Gait  x 300 feet with Supervision using No Assistive Device.   3. Ascend/descend 5 stairs Stand-by Assistance using No Assistive Device.                          History:     History reviewed. No pertinent past medical history.    Past Surgical History:   Procedure Laterality Date    CORONARY ANGIOGRAPHY N/A 3/22/2024    Procedure: ANGIOGRAM, CORONARY ARTERY;  Surgeon: Yumi Bal MD;  Location: [facility] CATH LAB;  Service: Cardiology;  Laterality: N/A;    CORONARY ARTERY  BYPASS GRAFT (CABG) N/A 3/27/2024    Procedure: CORONARY ARTERY BYPASS GRAFT (CABG);  Surgeon: Tony Canas MD;  Location: [facility] OR [address] [place] [address];  Service: Cardiovascular;  Laterality: N/A;  left atrial appendage resection    ENDOSCOPIC HARVEST OF VEIN Left 3/27/2024    Procedure: HARVEST-VEIN-ENDOVASCULAR;  Surgeon: Tony Canas MD;  Location: [facility] OR [address] [place] [address];  Service: Cardiovascular;  Laterality: Left;  VEIN HARVEST START 0917  VEIN OUT AND PREP START 0945  VEIN READY 1009    LEFT HEART CATHETERIZATION Left 3/22/2024    Procedure: Left heart cath;  Surgeon: Yumi Bal MD;  Location: [facility] CATH LAB;  Service: Cardiology;  Laterality: Left;       Time Tracking:     PT Received On: 03/28/24  PT Start Time: 0128     PT Stop Time: 0154  PT Total Time (min): 26 min     Billable Minutes: Evaluation 8 and Gait Training 15

## 2024-03-28 NOTE — NURSING
SICU PLAN OF CARE NOTE    Dx: Left main coronary artery thrombosis    Shift Events: VSS, see nursing notes for shift details    Gtts: Insulin    Neuro: AAO x4, Follows Commands, and Moves All Extremities    Cardiac: NSR 80s-90s    Respiratory: Room Air    GI: NPO    : Urinary Catheter 835 cc/shift    Drains: Chest Tube, total output 410 cc /  shift     Labs/Accuchecks: daily labs, accuchecks Q1    Skin: No new skin breakdown noted during shift, pt turned and repositioned throughout shift. Pillows in use, foams in place, SCDs on. Pt and wife updated on plan of care and verbalize understanding.

## 2024-03-28 NOTE — PLAN OF CARE
Recommendations     1.) Recommend if and when medically feasible to ADAT, with goal of Cardiac diet, fluid per MD.      2.) If diet advances, recommend Boost Plus (or Boost alternate) BID to help meet increased needs.      3.) RD to monitor wt, PO intake, skin, labs.         Goals: to meet % of EEN/EPN by next RD f/u  Nutrition Goal Status: new  Communication of RD Recs:  (POC)

## 2024-03-28 NOTE — PLAN OF CARE
Problem: Physical Therapy  Goal: Physical Therapy Goal  Description: Goals to be met by: 2024     Patient will increase functional independence with mobility by performin. Supine to sit with Stand-by Assistance  2. Gait  x 300 feet with Supervision using No Assistive Device.   3. Ascend/descend 5 stairs Stand-by Assistance using No Assistive Device.     3/28/2024 1416 by Aliza Gtz SPT  Outcome: Ongoing, Progressing    Eval completed. Goals Appropriate.

## 2024-03-28 NOTE — PROGRESS NOTES
[facility] - Surgical Intensive Care  Critical Care - Surgery  Progress Note    Patient Name: Gerald Wilkinson  MRN: [id number]  Admission Date: 3/22/2024  Hospital Length of Stay: 6 days  Code Status: Full Code  Attending Provider: Tony Canas MD  Primary Care Provider: Phil Tellez MD   Principal Problem: Left main coronary artery thrombosis    Subjective:     Hospital/ICU Course:  No notes on file    Interval History/Significant Events: 1x emesis overnight with gas bubble on KUB, patient anxious and denied NGT. HDS off gtts.  about 1000cc combined. Holding aspirin, start lopressor 25 BID, lasix IV 10 mg Bid. CMP q6h.     Follow-up For: Procedure(s) (LRB):  CORONARY ARTERY BYPASS GRAFT (CABG) (N/A)  HARVEST-VEIN-ENDOVASCULAR (Left)    Post-Operative Day: 1 Day Post-Op    Objective:     Vital Signs (Most Recent):  Temp: 98 °F (36.7 °C) (03/28/24 0300)  Pulse: 90 (03/28/24 0802)  Resp: 15 (03/28/24 0802)  BP: 128/60 (03/28/24 0630)  SpO2: 97 % (03/28/24 0802) Vital Signs (24h Range):  Temp:  [96.4 °F (35.8 °C)-98.2 °F (36.8 °C)] 98 °F (36.7 °C)  Pulse:  [] 90  Resp:  [7-28] 15  SpO2:  [94 %-99 %] 97 %  BP: (110-168)/(58-84) 128/60  Arterial Line BP: (100-186)/(41-66) 126/41     Weight: 71.2 kg (156 lb 15.5 oz)  Body mass index is 23.18 kg/m².      Intake/Output Summary (Last 24 hours) at 3/28/2024 0822  Last data filed at 3/28/2024 0600  Gross per 24 hour   Intake 6067.89 ml   Output 3858 ml   Net 2209.89 ml          Physical Exam  Constitutional:       Interventions: He is sedated and intubated.   HENT:      Mouth/Throat:      Mouth: Mucous membranes are dry.      Pharynx: Oropharynx is clear.   Eyes:      Extraocular Movements: Extraocular movements intact.      Pupils: Pupils are equal, round, and reactive to light.   Cardiovascular:      Rate and Rhythm: Normal rate. Rhythm irregular.      Pulses: Normal pulses.      Comments: Left radial arterial line  Ventricular pacing wires   Pulmonary:       Effort: No respiratory distress. He is intubated.      Comments: 2 Mediastinal and 1 pleural chest tube  Abdominal:      General: Abdomen is flat.      Palpations: Abdomen is soft.   Genitourinary:     Comments: Dunn in place   Musculoskeletal:      Cervical back: Neck supple.      Right lower leg: No edema.      Left lower leg: No edema.   Skin:     General: Skin is warm and dry.      Capillary Refill: Capillary refill takes less than 2 seconds.      Comments: MSI CDI            Vents:       Lines/Drains/Airways       Central Venous Catheter Line  Duration             Percutaneous Central Line - Triple Lumen  03/27/24 Subclavian Left 1 day    Introducer 03/27/24 0842 <1 day              Drain  Duration                  Chest Tube 03/27/24 1211 Tube - 3 Left Pleural 32 Fr. <1 day         Chest Tube 03/27/24 1331 Left Mediastinal <1 day         Chest Tube 03/27/24 1331 Right Mediastinal <1 day         Urethral Catheter 03/27/24 0823 Non-latex;Straight-tip;Temperature probe 16 Fr. <1 day              Arterial Line  Duration             Arterial Line 03/27/24 0840 Left Radial <1 day              Line  Duration                  Pacer Wires 03/27/24 1200 <1 day              Peripheral Intravenous Line  Duration                  Peripheral IV - Single Lumen 03/27/24 0818 14 G  Left Upper Arm 1 day         Peripheral IV - Single Lumen 03/27/24 1200 18 G Anterior;Left;Proximal Forearm <1 day                    Significant Labs:    CBC/Anemia Profile:  Recent Labs   Lab 03/27/24  1729 03/27/24  1835 03/27/24 2011 03/27/24 2013 03/28/24  0403   WBC 15.39*  --  12.06  --  11.90   HGB 9.9*  --  8.9*  --  9.2*   HCT 30.0*   < > 26.7* 26* 27.6*     --  151  --  170   MCV 92  --  89  --  91   RDW 12.3  --  12.6  --  12.8    < > = values in this interval not displayed.        Chemistries:  Recent Labs   Lab 03/27/24  0538 03/27/24  1330 03/27/24 2011 03/28/24  0403    138  --  139   K 3.8 4.5  --  4.1    106   --  107   CO2 23 22*  --  19*   BUN 21 18  --  19   CREATININE 1.0 1.0  --  0.9   CALCIUM 9.6 8.1*  --  8.5*   ALBUMIN 4.0 2.9*  --  4.1   PROT 7.4 5.2*  --  6.3   BILITOT 0.7 0.8  --  0.6   ALKPHOS 42* 38*  --  26*   ALT 45* 39  --  30   AST 38 45*  --  45*   MG 2.2 2.7* 2.3 2.4   PHOS 2.7 3.0 3.8 3.3       All pertinent labs within the past 24 hours have been reviewed.    Significant Imaging:  I have reviewed all pertinent imaging results/findings within the past 24 hours.  Assessment/Plan:     Cardiac/Vascular  * Left main coronary artery thrombosis      Neuro/Psych:     - Sedation: none    - Pain:    - Scheduled Tylenol 1g q8h   -  Oxy PRN             Cardiac:     - S/P CABGx2 (LMA-LAD and SVG-OM) with Dr. Canas on 3/27/24    - BP Goal: MAP 75-85 and SBP <140    - Cleviprex PRN    - Pressors: None    - Anti-HTNs: Hydralazine PRN    - Rhythm: NSR    - Beta blocker: start lopressor 25 BID    - Statin: Atorvastatin 80 mg QD      Pulmonary:     - Goal SpO2 >92% currently on room air     - Chest Tubes x 4 (2 Meds & 2 Pleural)    - ABGs PRN  - Daily CXR      Renal:    - Trend BUN/Cr     - Maintain Dunn, record strict Is/Os  - 10 mg IV     Recent Labs   Lab 03/27/24  0538 03/27/24  1330 03/28/24  0403   BUN 21 18 19   CREATININE 1.0 1.0 0.9           FEN / GI:     - Daily CMP, PRN K/Mag/Phos per protocol     - Replace electrolytes as needed    - Nutrition NPO sips with meds; advance to CLD if passing gas    - Bowel Regimen: docusate      ID:     - Afebrile    - WBC slightly up trended    - Abx: Complete perioperative cefazolin 2g Q8H x 5 doses    Recent Labs   Lab 03/27/24  1729 03/27/24 2011 03/28/24  0403   WBC 15.39* 12.06 11.90           Heme/Onc:     - Hgb 16.4 pre-operatively    - CBC daily    - Holding ASA due to increased     Recent Labs   Lab 03/27/24  1330 03/27/24  1729 03/27/24 2011 03/28/24  0403   HGB 11.5* 9.9* 8.9* 9.2*    173 151 170   APTT 27.3  --  29.0 28.9   INR 1.1  --  1.1 1.0            Endocrine:     - CTS Goal -140    - Endocrinology consulted for insulin management      PPx:   Feeding: NPO, sips with meds advance if passing gas today  Analgesia/Sedation: as above  Thromboembolic Prevention: SCDs  HOB >30: Yes  Stress Ulcer: famotidine  Glucose Control: Yes, insulin management per Endocrinology     Lines/Drains/Airway:   Left radial arterial line   L subclavian CVC   Dunn   Chest Tubes: 4 (2 Mediastinal, 2 Pleural)    Pacing Wires: Temporary ventricular pacing wires      Dispo/Code Status/Palliative:     - SICU    - Full Code           Critical care was time spent personally by me on the following activities: development of treatment plan with patient or surrogate and bedside caregivers, discussions with consultants, evaluation of patient's response to treatment, examination of patient, ordering and performing treatments and interventions, ordering and review of laboratory studies, ordering and review of radiographic studies, pulse oximetry, re-evaluation of patient's condition.  This critical care time did not overlap with that of any other provider or involve time for any procedures.     Sinan Swann, DO  Critical Care - Surgery  [facility] - Surgical Intensive Care

## 2024-03-28 NOTE — ASSESSMENT & PLAN NOTE
Neuro/Psych:     - Sedation: none    - Pain:    - Scheduled Tylenol 1g q8h   -  Oxy PRN             Cardiac:     - S/P CABGx2 (LMA-LAD and SVG-OM) with Dr. Canas on 3/27/24    - BP Goal: MAP 75-85 and SBP <140    - Cleviprex PRN    - Pressors: None    - Anti-HTNs: Hydralazine PRN    - Rhythm: NSR    - Beta blocker: start lopressor 25 BID    - Statin: Atorvastatin 80 mg QD      Pulmonary:     - Goal SpO2 >92% currently on room air     - Chest Tubes x 4 (2 Meds & 2 Pleural)    - ABGs PRN  - Daily CXR      Renal:    - Trend BUN/Cr     - Maintain Dunn, record strict Is/Os  - 10 mg IV     Recent Labs   Lab 03/27/24  0538 03/27/24  1330 03/28/24  0403   BUN 21 18 19   CREATININE 1.0 1.0 0.9           FEN / GI:     - Daily CMP, PRN K/Mag/Phos per protocol     - Replace electrolytes as needed    - Nutrition NPO sips with meds; advance to CLD if passing gas    - Bowel Regimen: docusate      ID:     - Afebrile    - WBC slightly up trended    - Abx: Complete perioperative cefazolin 2g Q8H x 5 doses    Recent Labs   Lab 03/27/24  1729 03/27/24 2011 03/28/24  0403   WBC 15.39* 12.06 11.90           Heme/Onc:     - Hgb 16.4 pre-operatively    - CBC daily    - Holding ASA due to increased     Recent Labs   Lab 03/27/24  1330 03/27/24  1729 03/27/24 2011 03/28/24  0403   HGB 11.5* 9.9* 8.9* 9.2*    173 151 170   APTT 27.3  --  29.0 28.9   INR 1.1  --  1.1 1.0           Endocrine:     - CTS Goal -140    - Endocrinology consulted for insulin management      PPx:   Feeding: NPO, sips with meds advance if passing gas today  Analgesia/Sedation: as above  Thromboembolic Prevention: SCDs  HOB >30: Yes  Stress Ulcer: famotidine  Glucose Control: Yes, insulin management per Endocrinology     Lines/Drains/Airway:   Left radial arterial line   L subclavian CVC   Dunn   Chest Tubes: 4 (2 Mediastinal, 2 Pleural)    Pacing Wires: Temporary ventricular pacing wires      Dispo/Code Status/Palliative:     - SICU    - Full  Code

## 2024-03-28 NOTE — ASSESSMENT & PLAN NOTE
BG goal: 110-140  No known hx of DM.  S/p CABG POD1.  On IIP per CTS protocol.  BG at goal.    - Discontinue Intensive insulin drip    - Start Moderate Dose Correction scale 150/25.  - POCT Glucose Q4 hours  - Hypoglycemia protocol in place      ** Please notify Endocrine for any change and/or advance in diet**  ** Please call Endocrine for any BG related issues **     Discharge Planning:   TBD. Please notify endocrinology prior to discharge.

## 2024-03-28 NOTE — NURSING
Pt report given to receiving RN; Pt plan of care discussed; Pt VSS; Pt has no C/O pain or discomfort at this time; Pt bed locked + lowered, Srx3, + call bell within reach.

## 2024-03-28 NOTE — SUBJECTIVE & OBJECTIVE
"Interval HPI:   No acute events overnight. Patient in room 23809 CVICU/55223 CVICU A. Blood glucose improving. BG at goal on current insulin regimen (IIP). Steroid use- None. CABG 1 Day Post-Op  Renal function- Normal   Vasopressors-  None       Endocrine will continue to follow and manage insulin orders inpatient.         Diet NPO Except for: Sips with Medication    Eating:   NPO  Nausea: No  Hypoglycemia and intervention: No  Fever: No  TPN and/or TF: No  If yes, type of TF/TPN and rate: N/A    /61 (BP Location: Right arm, Patient Position: Lying)   Pulse 83   Temp 98.4 °F (36.9 °C) (Oral)   Resp (!) 22   Ht 5' 9" (1.753 m)   Wt 71.2 kg (156 lb 15.5 oz)   SpO2 97%   BMI 23.18 kg/m²     Labs Reviewed and Include    Recent Labs   Lab 03/28/24  1108   *   CALCIUM 8.7   ALBUMIN 4.1   PROT 6.5      K 4.1   CO2 22*      BUN 19   CREATININE 1.3   ALKPHOS 30*   ALT 26   AST 41*   BILITOT 0.7     Lab Results   Component Value Date    WBC 11.90 03/28/2024    HGB 9.2 (L) 03/28/2024    HCT 27.6 (L) 03/28/2024    MCV 91 03/28/2024     03/28/2024     No results for input(s): "TSH", "FREET4" in the last 168 hours.  No results found for: "HGBA1C"    Nutritional status:   Body mass index is 23.18 kg/m².  Lab Results   Component Value Date    ALBUMIN 4.1 03/28/2024    ALBUMIN 4.1 03/28/2024    ALBUMIN 2.9 (L) 03/27/2024     No results found for: "PREALBUMIN"    Estimated Creatinine Clearance: 49.1 mL/min (based on SCr of 1.3 mg/dL).    Accu-Checks  Recent Labs     03/28/24  0002 03/28/24  0103 03/28/24  0157 03/28/24  0402 03/28/24  0519 03/28/24  0617 03/28/24  0718 03/28/24  0817 03/28/24  0836 03/28/24  1110   POCTGLUCOSE 129* 131* 124* 112* 102 126* 114* 88 101 127*       Current Medications and/or Treatments Impacting Glycemic Control  Immunotherapy:    Immunosuppressants       None          Steroids:   Hormones (From admission, onward)      Start     Stop Route Frequency Ordered    " 03/22/24 1411  melatonin tablet 6 mg         -- Oral Nightly PRN 03/22/24 1312          Pressors:    Autonomic Drugs (From admission, onward)      None          Hyperglycemia/Diabetes Medications:   Antihyperglycemics (From admission, onward)      Start     Stop Route Frequency Ordered    03/28/24 1047  insulin aspart U-100 pen 0-10 Units         -- SubQ Before meals & nightly PRN 03/28/24 0941

## 2024-03-28 NOTE — SUBJECTIVE & OBJECTIVE
Interval History/Significant Events: 1x emesis overnight with gas bubble on KUB, patient anxious and denied NGT. HDS off gtts.  about 1000cc combined. Holding aspirin, start lopressor 25 BID, lasix IV 10 mg Bid. CMP q6h.     Follow-up For: Procedure(s) (LRB):  CORONARY ARTERY BYPASS GRAFT (CABG) (N/A)  HARVEST-VEIN-ENDOVASCULAR (Left)    Post-Operative Day: 1 Day Post-Op    Objective:     Vital Signs (Most Recent):  Temp: 98 °F (36.7 °C) (03/28/24 0300)  Pulse: 90 (03/28/24 0802)  Resp: 15 (03/28/24 0802)  BP: 128/60 (03/28/24 0630)  SpO2: 97 % (03/28/24 0802) Vital Signs (24h Range):  Temp:  [96.4 °F (35.8 °C)-98.2 °F (36.8 °C)] 98 °F (36.7 °C)  Pulse:  [] 90  Resp:  [7-28] 15  SpO2:  [94 %-99 %] 97 %  BP: (110-168)/(58-84) 128/60  Arterial Line BP: (100-186)/(41-66) 126/41     Weight: 71.2 kg (156 lb 15.5 oz)  Body mass index is 23.18 kg/m².      Intake/Output Summary (Last 24 hours) at 3/28/2024 0822  Last data filed at 3/28/2024 0600  Gross per 24 hour   Intake 6067.89 ml   Output 3858 ml   Net 2209.89 ml          Physical Exam  Constitutional:       Interventions: He is sedated and intubated.   HENT:      Mouth/Throat:      Mouth: Mucous membranes are dry.      Pharynx: Oropharynx is clear.   Eyes:      Extraocular Movements: Extraocular movements intact.      Pupils: Pupils are equal, round, and reactive to light.   Cardiovascular:      Rate and Rhythm: Normal rate. Rhythm irregular.      Pulses: Normal pulses.      Comments: Left radial arterial line  Ventricular pacing wires   Pulmonary:      Effort: No respiratory distress. He is intubated.      Comments: 2 Mediastinal and 1 pleural chest tube  Abdominal:      General: Abdomen is flat.      Palpations: Abdomen is soft.   Genitourinary:     Comments: Dunn in place   Musculoskeletal:      Cervical back: Neck supple.      Right lower leg: No edema.      Left lower leg: No edema.   Skin:     General: Skin is warm and dry.      Capillary Refill:  Capillary refill takes less than 2 seconds.      Comments: MSI CDI            Vents:       Lines/Drains/Airways       Central Venous Catheter Line  Duration             Percutaneous Central Line - Triple Lumen  03/27/24 Subclavian Left 1 day    Introducer 03/27/24 0842 <1 day              Drain  Duration                  Chest Tube 03/27/24 1211 Tube - 3 Left Pleural 32 Fr. <1 day         Chest Tube 03/27/24 1331 Left Mediastinal <1 day         Chest Tube 03/27/24 1331 Right Mediastinal <1 day         Urethral Catheter 03/27/24 0823 Non-latex;Straight-tip;Temperature probe 16 Fr. <1 day              Arterial Line  Duration             Arterial Line 03/27/24 0840 Left Radial <1 day              Line  Duration                  Pacer Wires 03/27/24 1200 <1 day              Peripheral Intravenous Line  Duration                  Peripheral IV - Single Lumen 03/27/24 0818 14 G  Left Upper Arm 1 day         Peripheral IV - Single Lumen 03/27/24 1200 18 G Anterior;Left;Proximal Forearm <1 day                    Significant Labs:    CBC/Anemia Profile:  Recent Labs   Lab 03/27/24  1729 03/27/24  1835 03/27/24 2011 03/27/24 2013 03/28/24  0403   WBC 15.39*  --  12.06  --  11.90   HGB 9.9*  --  8.9*  --  9.2*   HCT 30.0*   < > 26.7* 26* 27.6*     --  151  --  170   MCV 92  --  89  --  91   RDW 12.3  --  12.6  --  12.8    < > = values in this interval not displayed.        Chemistries:  Recent Labs   Lab 03/27/24  0538 03/27/24  1330 03/27/24 2011 03/28/24  0403    138  --  139   K 3.8 4.5  --  4.1    106  --  107   CO2 23 22*  --  19*   BUN 21 18  --  19   CREATININE 1.0 1.0  --  0.9   CALCIUM 9.6 8.1*  --  8.5*   ALBUMIN 4.0 2.9*  --  4.1   PROT 7.4 5.2*  --  6.3   BILITOT 0.7 0.8  --  0.6   ALKPHOS 42* 38*  --  26*   ALT 45* 39  --  30   AST 38 45*  --  45*   MG 2.2 2.7* 2.3 2.4   PHOS 2.7 3.0 3.8 3.3       All pertinent labs within the past 24 hours have been reviewed.    Significant Imaging:  I have  reviewed all pertinent imaging results/findings within the past 24 hours.

## 2024-03-28 NOTE — PT/OT/SLP EVAL
Occupational Therapy   Evaluation/Treatment    Name: Gerald Wilkinson  MRN: [id number]  Admitting Diagnosis: Left main coronary artery thrombosis  Recent Surgery: Procedure(s) (LRB):  CORONARY ARTERY BYPASS GRAFT (CABG) (N/A)  HARVEST-VEIN-ENDOVASCULAR (Left) 1 Day Post-Op    Recommendations:     Discharge Recommendations: No Therapy Indicated  Discharge Equipment Recommendations:  none  Barriers to discharge:  None    Assessment:     Gerald Wilkinson is a 73 y.o. male with a medical diagnosis of Left main coronary artery thrombosis. Performance deficits affecting function: weakness, impaired endurance, impaired self care skills, impaired functional mobility, gait instability, impaired balance, impaired cardiopulmonary response to activity. Patient cleared for therapy and needed education on role of therapy with benefit of therapy at beginning of session. Sternal precaution education provided prior to mobility. Patient ambulated to sink for ADLs as noted below. Patient then ambulated in room prior to returning to bedside chair. All VSS throughout session. Patient would benefit from continued skilled acute OT 5x/wk to improve functional mobility, increase independence with ADLs, and address established goals.    Rehab Prognosis: Good; patient would benefit from acute skilled OT services to address these deficits and reach maximum level of function.       Plan:     Patient to be seen 5 x/week to address the above listed problems via self-care/home management, therapeutic activities, therapeutic exercises  Plan of Care Expires: 04/28/24  Plan of Care Reviewed with: patient, spouse    Subjective     Chief Complaint: I was told I could not get up.   Patient/Family Comments/goals: to go home    Occupational Profile:  Living Environment: Patient lives with wife in a [facility] with 5 DELICIA to enter the front door and 5 DELICIA to enter the back door. Patient was independent with ADLs and functional mobility PTA. Patient owns no DME. Patient  drives. Patient is retired. Patient enjoys golfing.     Pain/Comfort:  Pain Rating 1: 0/10  Pain Rating Post-Intervention 1: 0/10    Patients cultural, spiritual, Sabianist conflicts given the current situation: no    Objective:     Communicated with: FRANCESCA prior to session.  Patient found up in chair with central line, peripheral IV, telemetry, pulse ox (continuous), blood pressure cuff, chest tube, valdovinos catheter upon OT entry to room.    General Precautions: Standard, fall, sternal  Orthopedic Precautions: N/A  Braces: N/A  Respiratory Status: Room air    Occupational Performance:    Functional Mobility/Transfers:  Patient completed Sit <> Stand Transfer with stand by assistance  with  no assistive device   Functional Mobility: Patient ambulated bedside chair>sink in bathroom for g/h task as noted below with no AD and SBA. Patient then ambulated with no AD and SBA in room prior to returning to bedside chair.      Activities of Daily Living:  Grooming: stand by assistance standing at sink for oral care and washing face  Upper Body Dressing: maximal assistance Donning back gown sitting in bedside chair due to lines    Cognitive/Visual Perceptual:  Cognitive/Psychosocial Skills:     -       Oriented to: Person, Place, Time, and Situation   -       Follows Commands/attention:Follows multistep  commands  -       Communication: clear/fluent  -       Memory: No Deficits noted  -       Safety awareness/insight to disability: intact   -       Mood/Affect/Coping skills/emotional control: Appropriate to situation  Visual/Perceptual:      -Intact      Physical Exam:  Upper Extremity Range of Motion:     -       Right Upper Extremity: WFL  -       Left Upper Extremity: WFL  Upper Extremity Strength:    -       Right Upper Extremity: Not tested due to sternal precautions  -       Left Upper Extremity: Not tested due to sternal precautions   Strength:    -       Right Upper Extremity: WFL  -       Left Upper Extremity:  WFL  Fine Motor Coordination:    -       Intact    AMPAC 6 Click ADL:  AMPAC Total Score: 17    Treatment & Education:  Role of OT and POC  ADL retraining  Functional mobility training  Safety  Discharge planning  Importance EOB/OOB activity    Co-treatment performed due to patient's multiple deficits requiring two skilled therapists to appropriately and safely assess patient's strength and endurance while facilitating functional tasks in addition to accommodating for patient's activity tolerance.     Patient left HOB elevated with all lines intact, call button in reach, and all needs met. Wife present. PT/PT student still in room.     GOALS:   Multidisciplinary Problems       Occupational Therapy Goals          Problem: Occupational Therapy    Goal Priority Disciplines Outcome Interventions   Occupational Therapy Goal     OT, PT/OT Ongoing, Progressing    Description: Goals to be met by: 4/18/2024     Patient will increase functional independence with ADLs by performing:    UE Dressing with Set-up Assistance.  LE Dressing with Supervision.  Grooming while standing at sink with Modified [place].  Toileting from toilet with Modified [place] for hygiene and clothing management.   Supine to sit with Supervision.  Stand pivot transfers with Modified [place].  Toilet transfer to toilet with Modified [place].                         History:     History reviewed. No pertinent past medical history.      Past Surgical History:   Procedure Laterality Date    CORONARY ANGIOGRAPHY N/A 3/22/2024    Procedure: ANGIOGRAM, CORONARY ARTERY;  Surgeon: Yumi Bal MD;  Location: [facility] CATH LAB;  Service: Cardiology;  Laterality: N/A;    CORONARY ARTERY BYPASS GRAFT (CABG) N/A 3/27/2024    Procedure: CORONARY ARTERY BYPASS GRAFT (CABG);  Surgeon: Tony Canas MD;  Location: [facility] OR [address] [place] [address];  Service: Cardiovascular;  Laterality: N/A;  left atrial appendage resection    ENDOSCOPIC HARVEST OF VEIN Left 3/27/2024     Procedure: HARVEST-VEIN-ENDOVASCULAR;  Surgeon: Tony Canas MD;  Location: [facility] OR [address] [place] [address];  Service: Cardiovascular;  Laterality: Left;  VEIN HARVEST START 0917  VEIN OUT AND PREP START 0945  VEIN READY 1009    LEFT HEART CATHETERIZATION Left 3/22/2024    Procedure: Left heart cath;  Surgeon: Yumi Bal MD;  Location: [facility] CATH LAB;  Service: Cardiology;  Laterality: Left;       Time Tracking:     OT Date of Treatment: 03/28/24  OT Start Time: 1327  OT Stop Time: 1354  OT Total Time (min): 27 min    Billable Minutes:Evaluation 10  Self Care/Home Management 17    3/28/2024

## 2024-03-28 NOTE — CARE UPDATE
"-Glucose Goal 110-140    -A1C: No results found for: "HGBA1C"      -HOME REGIMEN: non-diabetic    -GLUCOSE TREND FOR THE PAST 24HRS:   Recent Labs   Lab 03/28/24  0519 03/28/24  0617 03/28/24  0718 03/28/24  0817 03/28/24  0836 03/28/24  1110   POCTGLUCOSE 102 126* 114* 88 101 127*         -NO HYPOGYCEMIAS NOTED     - Diet - Diet NPO Except for: Sips with Medication    -NPO - Yes    -Steroids - No    -Tube Feeds - No      Plan:   -  Discontinue insulin drip  - Start Moderate Dose Correction 150/25  - POCT Glucose every 4 hours  - Hypoglycemia protocol in place      ** Please notify Endocrine for any change and/or advance in diet**  ** Please call Endocrine for any BG related issues **     Discharge Planning:   TBD. Please notify endocrinology prior to discharge.    Ritu Ray PA-C   Endocrinology       "

## 2024-03-28 NOTE — NURSING
Pt complaining of intermittent nausea, emesis x1. Hypoactive bowel sounds and belly is firm. Dr. Paiz notified orders received for STAT KUB. Pt became extremely anxious and angry,pt spouse contacted per patients request. Pt hypertensive SBP 180s-190, pt refusing any intervention until wife is at bedside.     2300: MD at bedside to discuss KUB findings, pt refuses NGT at this time.

## 2024-03-28 NOTE — ANESTHESIA POSTPROCEDURE EVALUATION
Anesthesia Post Evaluation    Patient: Gerald Wilkinson    Procedure(s) Performed: Procedure(s) (LRB):  CORONARY ARTERY BYPASS GRAFT (CABG) (N/A)  HARVEST-VEIN-ENDOVASCULAR (Left)    Final Anesthesia Type: general      Patient location during evaluation: ICU  Patient participation: Yes- Able to Participate  Level of consciousness: awake  Post-procedure vital signs: reviewed and stable  Pain management: adequate  Airway patency: patent      Anesthetic complications: no      Cardiovascular status: hemodynamically stable  Respiratory status: unassisted  Follow-up not needed.              Vitals Value Taken Time   /64 03/28/24 1301   Temp 36.9 °C (98.4 °F) 03/28/24 1100   Pulse 115 03/28/24 1348   Resp 38 03/28/24 1348   SpO2 97 % 03/28/24 1338   Vitals shown include unvalidated device data.      No case tracking events are documented in the log.      Pain/Ciera Score: Pain Rating Prior to Med Admin: 7 (3/27/2024 11:37 PM)  Pain Rating Post Med Admin: 0 (3/28/2024 12:07 AM)

## 2024-03-29 LAB
ALBUMIN SERPL BCP-MCNC: 3.5 G/DL (ref 3.5–5.2)
ALBUMIN SERPL BCP-MCNC: 3.8 G/DL (ref 3.5–5.2)
ALBUMIN SERPL BCP-MCNC: 3.9 G/DL (ref 3.5–5.2)
ALP SERPL-CCNC: 30 U/L (ref 55–135)
ALP SERPL-CCNC: 31 U/L (ref 55–135)
ALP SERPL-CCNC: 32 U/L (ref 55–135)
ALP SERPL-CCNC: 35 U/L (ref 55–135)
ALP SERPL-CCNC: 42 U/L (ref 55–135)
ALT SERPL W/O P-5'-P-CCNC: 21 U/L (ref 10–44)
ALT SERPL W/O P-5'-P-CCNC: 21 U/L (ref 10–44)
ALT SERPL W/O P-5'-P-CCNC: 22 U/L (ref 10–44)
ALT SERPL W/O P-5'-P-CCNC: 22 U/L (ref 10–44)
ALT SERPL W/O P-5'-P-CCNC: 23 U/L (ref 10–44)
ANION GAP SERPL CALC-SCNC: 11 MMOL/L (ref 8–16)
ANION GAP SERPL CALC-SCNC: 6 MMOL/L (ref 8–16)
ANION GAP SERPL CALC-SCNC: 9 MMOL/L (ref 8–16)
APTT PPP: 31.9 SEC (ref 21–32)
AST SERPL-CCNC: 30 U/L (ref 10–40)
AST SERPL-CCNC: 32 U/L (ref 10–40)
AST SERPL-CCNC: 32 U/L (ref 10–40)
AST SERPL-CCNC: 33 U/L (ref 10–40)
AST SERPL-CCNC: 35 U/L (ref 10–40)
BASOPHILS # BLD AUTO: 0.01 K/UL (ref 0–0.2)
BASOPHILS NFR BLD: 0.1 % (ref 0–1.9)
BILIRUB SERPL-MCNC: 0.7 MG/DL (ref 0.1–1)
BILIRUB SERPL-MCNC: 0.8 MG/DL (ref 0.1–1)
BUN SERPL-MCNC: 26 MG/DL (ref 8–23)
BUN SERPL-MCNC: 27 MG/DL (ref 8–23)
BUN SERPL-MCNC: 27 MG/DL (ref 8–23)
BUN SERPL-MCNC: 28 MG/DL (ref 8–23)
BUN SERPL-MCNC: 28 MG/DL (ref 8–23)
CALCIUM SERPL-MCNC: 8.7 MG/DL (ref 8.7–10.5)
CALCIUM SERPL-MCNC: 8.7 MG/DL (ref 8.7–10.5)
CALCIUM SERPL-MCNC: 8.9 MG/DL (ref 8.7–10.5)
CALCIUM SERPL-MCNC: 9.3 MG/DL (ref 8.7–10.5)
CALCIUM SERPL-MCNC: 9.5 MG/DL (ref 8.7–10.5)
CHLORIDE SERPL-SCNC: 104 MMOL/L (ref 95–110)
CHLORIDE SERPL-SCNC: 105 MMOL/L (ref 95–110)
CHLORIDE SERPL-SCNC: 107 MMOL/L (ref 95–110)
CHLORIDE SERPL-SCNC: 109 MMOL/L (ref 95–110)
CHLORIDE SERPL-SCNC: 109 MMOL/L (ref 95–110)
CO2 SERPL-SCNC: 25 MMOL/L (ref 23–29)
CO2 SERPL-SCNC: 26 MMOL/L (ref 23–29)
CREAT SERPL-MCNC: 0.8 MG/DL (ref 0.5–1.4)
CREAT SERPL-MCNC: 0.9 MG/DL (ref 0.5–1.4)
CREAT SERPL-MCNC: 1 MG/DL (ref 0.5–1.4)
DIFFERENTIAL METHOD BLD: ABNORMAL
EOSINOPHIL # BLD AUTO: 0 K/UL (ref 0–0.5)
EOSINOPHIL NFR BLD: 0.1 % (ref 0–8)
ERYTHROCYTE [DISTWIDTH] IN BLOOD BY AUTOMATED COUNT: 13.2 % (ref 11.5–14.5)
EST. GFR  (NO RACE VARIABLE): >60 ML/MIN/1.73 M^2
GLUCOSE SERPL-MCNC: 111 MG/DL (ref 70–110)
GLUCOSE SERPL-MCNC: 112 MG/DL (ref 70–110)
GLUCOSE SERPL-MCNC: 118 MG/DL (ref 70–110)
GLUCOSE SERPL-MCNC: 131 MG/DL (ref 70–110)
GLUCOSE SERPL-MCNC: 155 MG/DL (ref 70–110)
HCT VFR BLD AUTO: 26.2 % (ref 40–54)
HGB BLD-MCNC: 8.7 G/DL (ref 14–18)
IMM GRANULOCYTES # BLD AUTO: 0.05 K/UL (ref 0–0.04)
IMM GRANULOCYTES NFR BLD AUTO: 0.4 % (ref 0–0.5)
INR PPP: 1 (ref 0.8–1.2)
LYMPHOCYTES # BLD AUTO: 1.1 K/UL (ref 1–4.8)
LYMPHOCYTES NFR BLD: 9.4 % (ref 18–48)
MAGNESIUM SERPL-MCNC: 2.6 MG/DL (ref 1.6–2.6)
MCH RBC QN AUTO: 30.1 PG (ref 27–31)
MCHC RBC AUTO-ENTMCNC: 33.2 G/DL (ref 32–36)
MCV RBC AUTO: 91 FL (ref 82–98)
MONOCYTES # BLD AUTO: 1 K/UL (ref 0.3–1)
MONOCYTES NFR BLD: 8.8 % (ref 4–15)
NEUTROPHILS # BLD AUTO: 9.5 K/UL (ref 1.8–7.7)
NEUTROPHILS NFR BLD: 81.2 % (ref 38–73)
NRBC BLD-RTO: 0 /100 WBC
PHOSPHATE SERPL-MCNC: 2.1 MG/DL (ref 2.7–4.5)
PLATELET # BLD AUTO: 138 K/UL (ref 150–450)
PMV BLD AUTO: 11.4 FL (ref 9.2–12.9)
POCT GLUCOSE: 118 MG/DL (ref 70–110)
POCT GLUCOSE: 132 MG/DL (ref 70–110)
POCT GLUCOSE: 136 MG/DL (ref 70–110)
POCT GLUCOSE: 150 MG/DL (ref 70–110)
POTASSIUM SERPL-SCNC: 3.9 MMOL/L (ref 3.5–5.1)
POTASSIUM SERPL-SCNC: 4 MMOL/L (ref 3.5–5.1)
POTASSIUM SERPL-SCNC: 4.1 MMOL/L (ref 3.5–5.1)
POTASSIUM SERPL-SCNC: 4.1 MMOL/L (ref 3.5–5.1)
POTASSIUM SERPL-SCNC: 4.2 MMOL/L (ref 3.5–5.1)
PROT SERPL-MCNC: 5.9 G/DL (ref 6–8.4)
PROT SERPL-MCNC: 6.6 G/DL (ref 6–8.4)
PROT SERPL-MCNC: 6.9 G/DL (ref 6–8.4)
PROTHROMBIN TIME: 11.2 SEC (ref 9–12.5)
RBC # BLD AUTO: 2.89 M/UL (ref 4.6–6.2)
SODIUM SERPL-SCNC: 138 MMOL/L (ref 136–145)
SODIUM SERPL-SCNC: 139 MMOL/L (ref 136–145)
SODIUM SERPL-SCNC: 140 MMOL/L (ref 136–145)
SODIUM SERPL-SCNC: 140 MMOL/L (ref 136–145)
SODIUM SERPL-SCNC: 141 MMOL/L (ref 136–145)
WBC # BLD AUTO: 11.67 K/UL (ref 3.9–12.7)

## 2024-03-29 PROCEDURE — 25000003 PHARM REV CODE 250

## 2024-03-29 PROCEDURE — 25000003 PHARM REV CODE 250: Performed by: STUDENT IN AN ORGANIZED HEALTH CARE EDUCATION/TRAINING PROGRAM

## 2024-03-29 PROCEDURE — 36415 COLL VENOUS BLD VENIPUNCTURE: CPT

## 2024-03-29 PROCEDURE — 99499 UNLISTED E&M SERVICE: CPT | Mod: ,,, | Performed by: ANESTHESIOLOGY

## 2024-03-29 PROCEDURE — 83735 ASSAY OF MAGNESIUM: CPT | Performed by: STUDENT IN AN ORGANIZED HEALTH CARE EDUCATION/TRAINING PROGRAM

## 2024-03-29 PROCEDURE — 97535 SELF CARE MNGMENT TRAINING: CPT

## 2024-03-29 PROCEDURE — 85730 THROMBOPLASTIN TIME PARTIAL: CPT | Performed by: STUDENT IN AN ORGANIZED HEALTH CARE EDUCATION/TRAINING PROGRAM

## 2024-03-29 PROCEDURE — 63600175 PHARM REV CODE 636 W HCPCS: Performed by: INTERNAL MEDICINE

## 2024-03-29 PROCEDURE — 80053 COMPREHEN METABOLIC PANEL: CPT | Performed by: STUDENT IN AN ORGANIZED HEALTH CARE EDUCATION/TRAINING PROGRAM

## 2024-03-29 PROCEDURE — 85025 COMPLETE CBC W/AUTO DIFF WBC: CPT | Performed by: STUDENT IN AN ORGANIZED HEALTH CARE EDUCATION/TRAINING PROGRAM

## 2024-03-29 PROCEDURE — 25000003 PHARM REV CODE 250: Performed by: THORACIC SURGERY (CARDIOTHORACIC VASCULAR SURGERY)

## 2024-03-29 PROCEDURE — 20600001 HC STEP DOWN PRIVATE ROOM

## 2024-03-29 PROCEDURE — 97110 THERAPEUTIC EXERCISES: CPT

## 2024-03-29 PROCEDURE — 80053 COMPREHEN METABOLIC PANEL: CPT | Mod: 91

## 2024-03-29 PROCEDURE — 85610 PROTHROMBIN TIME: CPT | Performed by: INTERNAL MEDICINE

## 2024-03-29 PROCEDURE — 84100 ASSAY OF PHOSPHORUS: CPT | Performed by: STUDENT IN AN ORGANIZED HEALTH CARE EDUCATION/TRAINING PROGRAM

## 2024-03-29 RX ORDER — ASPIRIN 81 MG/1
81 TABLET ORAL DAILY
Status: DISCONTINUED | OUTPATIENT
Start: 2024-03-29 | End: 2024-03-31 | Stop reason: HOSPADM

## 2024-03-29 RX ORDER — SODIUM,POTASSIUM PHOSPHATES 280-250MG
2 POWDER IN PACKET (EA) ORAL
Status: DISCONTINUED | OUTPATIENT
Start: 2024-03-29 | End: 2024-03-29

## 2024-03-29 RX ORDER — LACTULOSE 10 G/15ML
10 SOLUTION ORAL ONCE
Status: COMPLETED | OUTPATIENT
Start: 2024-03-29 | End: 2024-03-29

## 2024-03-29 RX ORDER — TAMSULOSIN HYDROCHLORIDE 0.4 MG/1
0.4 CAPSULE ORAL DAILY
Status: DISCONTINUED | OUTPATIENT
Start: 2024-03-29 | End: 2024-03-31 | Stop reason: HOSPADM

## 2024-03-29 RX ORDER — BISACODYL 10 MG/1
10 SUPPOSITORY RECTAL DAILY PRN
Status: DISCONTINUED | OUTPATIENT
Start: 2024-03-29 | End: 2024-03-31 | Stop reason: HOSPADM

## 2024-03-29 RX ORDER — SYRING-NEEDL,DISP,INSUL,0.3 ML 29 G X1/2"
296 SYRINGE, EMPTY DISPOSABLE MISCELLANEOUS ONCE
Status: COMPLETED | OUTPATIENT
Start: 2024-03-29 | End: 2024-03-29

## 2024-03-29 RX ADMIN — METOPROLOL TARTRATE 25 MG: 25 TABLET, FILM COATED ORAL at 08:03

## 2024-03-29 RX ADMIN — TAMSULOSIN HYDROCHLORIDE 0.4 MG: 0.4 CAPSULE ORAL at 09:03

## 2024-03-29 RX ADMIN — MUPIROCIN: 20 OINTMENT TOPICAL at 08:03

## 2024-03-29 RX ADMIN — QUETIAPINE FUMARATE 25 MG: 25 TABLET ORAL at 08:03

## 2024-03-29 RX ADMIN — MAGNESIUM CITRATE 296 ML: 1.75 LIQUID ORAL at 11:03

## 2024-03-29 RX ADMIN — SODIUM PHOSPHATE, MONOBASIC, MONOHYDRATE AND SODIUM PHOSPHATE, DIBASIC, ANHYDROUS 20.01 MMOL: 142; 276 INJECTION, SOLUTION INTRAVENOUS at 09:03

## 2024-03-29 RX ADMIN — ATORVASTATIN CALCIUM 80 MG: 40 TABLET, FILM COATED ORAL at 09:03

## 2024-03-29 RX ADMIN — FAMOTIDINE 20 MG: 10 INJECTION, SOLUTION INTRAVENOUS at 09:03

## 2024-03-29 RX ADMIN — ASPIRIN 81 MG: 81 TABLET, COATED ORAL at 09:03

## 2024-03-29 RX ADMIN — Medication 6 MG: at 08:03

## 2024-03-29 RX ADMIN — POTASSIUM CHLORIDE 20 MEQ: 200 INJECTION, SOLUTION INTRAVENOUS at 06:03

## 2024-03-29 RX ADMIN — METOPROLOL TARTRATE 25 MG: 25 TABLET, FILM COATED ORAL at 09:03

## 2024-03-29 RX ADMIN — ACETAMINOPHEN 1000 MG: 500 TABLET ORAL at 08:03

## 2024-03-29 RX ADMIN — ACETAMINOPHEN 1000 MG: 500 TABLET ORAL at 06:03

## 2024-03-29 RX ADMIN — LACTULOSE 10 G: 20 SOLUTION ORAL at 09:03

## 2024-03-29 RX ADMIN — MUPIROCIN: 20 OINTMENT TOPICAL at 09:03

## 2024-03-29 RX ADMIN — POLYETHYLENE GLYCOL 3350 17 G: 17 POWDER, FOR SOLUTION ORAL at 09:03

## 2024-03-29 NOTE — CARE UPDATE
"-Glucose Goal 110-140    -A1C: No results found for: "HGBA1C"      -HOME REGIMEN: non-diabetic    -GLUCOSE TREND FOR THE PAST 24HRS:   Recent Labs   Lab 03/28/24  0817 03/28/24  0836 03/28/24  1110 03/28/24  1709 03/28/24  2152 03/29/24  0612   POCTGLUCOSE 88 101 127* 143* 126* 118*           -NO HYPOGYCEMIAS NOTED     - Diet - Diet NPO Except for: Sips with Medication    -Steroids - No    -Tube Feeds - No    Remains in SICU. S/p CABG on 3/27. BG well controlled and within goal ranges with no prn SQ insulin requirements.      Plan:   - Continue Moderate Dose Correction 150/25  - POCT Glucose every 4 hours  - Hypoglycemia protocol in place      ** Please notify Endocrine for any change and/or advance in diet**  ** Please call Endocrine for any BG related issues **     Discharge Planning:   TBD. Please notify endocrinology prior to discharge.         "

## 2024-03-29 NOTE — PT/OT/SLP PROGRESS
"Occupational Therapy   Treatment    Name: Gerald Wilkinson  MRN: [id number]  Admitting Diagnosis:  Left main coronary artery thrombosis  2 Days Post-Op    Recommendations:     Discharge Recommendations: No Therapy Indicated  Discharge Equipment Recommendations:  none  Barriers to discharge:  None    Assessment:     Gerald Wilkinson is a 73 y.o. male with a medical diagnosis of Left main coronary artery thrombosis.  He presents with CABG. Performance deficits affecting function are weakness, impaired endurance, impaired self care skills, impaired functional mobility. Pt was able to perform sit/stand T/F c CGA and was able to walk to sink in room c CGA.  Performed UB dressing and LB dressing c min A.  Performed grooming task c set-up while standing at sink.  Able to tolerate B UE exercises well.  Pt c c/o gas pain which did not improve c walking.  Able to state sternal precautions.    Rehab Prognosis:  Good; patient would benefit from acute skilled OT services to address these deficits and reach maximum level of function.       Plan:     Patient to be seen 5 x/week to address the above listed problems via self-care/home management, therapeutic activities, therapeutic exercises  Plan of Care Expires: 04/28/24  Plan of Care Reviewed with: patient    Subjective     Chief Complaint: CABG  Patient/Family Comments/goals: "I feel awful"  Pain/Comfort:  Pain Rating 1: 0/10    Objective:     Communicated with: RN prior to session.  Patient found up in chair with blood pressure cuff, chest tube, valdovinos catheter, peripheral IV, pulse ox (continuous), telemetry upon OT entry to room.    General Precautions: Standard, fall, sternal    Orthopedic Precautions:N/A  Braces: N/A  Respiratory Status: Room air     Occupational Performance:     Bed Mobility:    Patient completed Sit to Supine with minimum assistance     Functional Mobility/Transfers:  Patient completed Sit <> Stand Transfer with contact guard assistance  with  no assistive device "   Functional Mobility: Pt was able to walk to sink c CGA.    Activities of Daily Living:  Grooming: contact guard assistance to wash hands and brush teeth while standing at sink.  Upper Body Dressing: maximal assistance to don hospital gown.  Lower Body Dressing: minimum assistance to don/doff socks crossing B LE in sitting.      [facility] 6 Click ADL: 18    Treatment & Education:  Pt was able to perform B UE AROM exercises 1x15 in all planes while sitting UI and tolerated well.    Patient left supine with all lines intact, call button in reach, and RN notified    GOALS:   Multidisciplinary Problems       Occupational Therapy Goals          Problem: Occupational Therapy    Goal Priority Disciplines Outcome Interventions   Occupational Therapy Goal     OT, PT/OT Ongoing, Progressing    Description: Goals to be met by: 4/18/2024     Patient will increase functional independence with ADLs by performing:    UE Dressing with Set-up Assistance.  LE Dressing with Supervision.  Grooming while standing at sink with Modified [place].  Toileting from toilet with Modified [place] for hygiene and clothing management.   Supine to sit with Supervision.  Stand pivot transfers with Modified [place].  Toilet transfer to toilet with Modified [place].                         Time Tracking:     OT Date of Treatment: 03/29/24  OT Start Time: 1330  OT Stop Time: 1400  OT Total Time (min): 30 min    Billable Minutes:Self Care/Home Management 15  Therapeutic Exercise 15               3/29/2024

## 2024-03-29 NOTE — PROGRESS NOTES
Report received from BRODIE Loo. Patient arrived to room 330 at 1453. VSS. Tele in place. Adequate SpO2 on RA. Patient has no C/O pain at this time. 3 CT's connected to suction. Patient oriented to room with call bell within reach. Safety measures maintained.

## 2024-03-29 NOTE — SUBJECTIVE & OBJECTIVE
Interval History/Significant Events: NAEON. HDS. Up and walking yesterday. Did not require clevi gtt. Pain controlled. Not passing gas. Increase bowel reg today.    Follow-up For: Procedure(s) (LRB):  CORONARY ARTERY BYPASS GRAFT (CABG) (N/A)  HARVEST-VEIN-ENDOVASCULAR (Left)    Post-Operative Day: 2 Days Post-Op    Objective:     Vital Signs (Most Recent):  Temp: 98.4 °F (36.9 °C) (03/28/24 1901)  Pulse: 84 (03/29/24 0500)  Resp: 18 (03/29/24 0500)  BP: 127/62 (03/29/24 0300)  SpO2: 97 % (03/29/24 0500) Vital Signs (24h Range):  Temp:  [98.4 °F (36.9 °C)-98.5 °F (36.9 °C)] 98.4 °F (36.9 °C)  Pulse:  [] 84  Resp:  [3-41] 18  SpO2:  [92 %-100 %] 97 %  BP: (102-173)/(59-79) 127/62  Arterial Line BP: (109-215)/() 136/53     Weight: 75.5 kg (166 lb 7.2 oz)  Body mass index is 24.58 kg/m².      Intake/Output Summary (Last 24 hours) at 3/29/2024 0557  Last data filed at 3/29/2024 0500  Gross per 24 hour   Intake 285.43 ml   Output 2220 ml   Net -1934.57 ml          Physical Exam  Vitals and nursing note reviewed.   Constitutional:       Appearance: Normal appearance.      Interventions: He is sedated and intubated.   HENT:      Mouth/Throat:      Mouth: Mucous membranes are dry.      Pharynx: Oropharynx is clear.   Eyes:      Extraocular Movements: Extraocular movements intact.      Pupils: Pupils are equal, round, and reactive to light.   Cardiovascular:      Rate and Rhythm: Normal rate. Rhythm irregular.      Pulses: Normal pulses.      Comments: Left radial arterial line  Ventricular pacing wires   Pulmonary:      Effort: No respiratory distress. He is intubated.      Comments: 2 Mediastinal and 1 pleural chest tube  Abdominal:      General: There is distension.      Tenderness: There is no abdominal tenderness. There is no guarding.   Genitourinary:     Comments: Dunn in place   Musculoskeletal:      Cervical back: Neck supple.      Right lower leg: No edema.      Left lower leg: No edema.   Skin:      General: Skin is warm and dry.      Capillary Refill: Capillary refill takes less than 2 seconds.      Comments: MSI CDI   Neurological:      Mental Status: He is alert.            Vents:       Lines/Drains/Airways       Central Venous Catheter Line  Duration             Percutaneous Central Line - Triple Lumen  03/27/24 Subclavian Left 2 days    Introducer 03/27/24 0842 1 day              Drain  Duration                  Chest Tube 03/27/24 1211 Tube - 3 Left Pleural 32 Fr. 1 day         Chest Tube 03/27/24 1331 Left Mediastinal 1 day         Chest Tube 03/27/24 1331 Right Mediastinal 1 day         Urethral Catheter 03/27/24 0823 Non-latex;Straight-tip;Temperature probe 16 Fr. 1 day              Arterial Line  Duration             Arterial Line 03/27/24 0840 Left Radial 1 day              Line  Duration                  Pacer Wires 03/27/24 1200 1 day              Peripheral Intravenous Line  Duration                  Peripheral IV - Single Lumen 03/27/24 0818 14 G  Left Upper Arm 1 day                    Significant Labs:    CBC/Anemia Profile:  Recent Labs   Lab 03/27/24 2011 03/27/24 2013 03/28/24  0403 03/29/24  0310   WBC 12.06  --  11.90 11.67   HGB 8.9*  --  9.2* 8.7*   HCT 26.7* 26* 27.6* 26.2*     --  170 138*   MCV 89  --  91 91   RDW 12.6  --  12.8 13.2        Chemistries:  Recent Labs   Lab 03/27/24 2011 03/28/24  0403 03/28/24  1108 03/28/24  1710 03/28/24  2354 03/29/24  0310   NA  --  139   < > 138 140 138   K  --  4.1   < > 4.0 4.1 3.9   CL  --  107   < > 106 109 107   CO2  --  19*   < > 23 25 25   BUN  --  19   < > 22 26* 28*   CREATININE  --  0.9   < > 1.2 1.0 0.8   CALCIUM  --  8.5*   < > 8.8 8.9 8.7   ALBUMIN  --  4.1   < > 4.0 3.5 3.5   PROT  --  6.3   < > 6.5 5.9* 5.9*   BILITOT  --  0.6   < > 0.8 0.8 0.8   ALKPHOS  --  26*   < > 31* 30* 32*   ALT  --  30   < > 24 22 21   AST  --  45*   < > 37 35 33   MG 2.3 2.4  --   --   --  2.6   PHOS 3.8 3.3  --   --   --  2.1*    < > = values in  this interval not displayed.       All pertinent labs within the past 24 hours have been reviewed.    Significant Imaging:  I have reviewed all pertinent imaging results/findings within the past 24 hours.

## 2024-03-29 NOTE — PLAN OF CARE
NAEON. VSS. BP remained within MAP goals. Room air. No complaints of pain. Slept well throughout night. 10mg Lasix IVP given for low UOP, minimal response after administration. PRN electrolytes replaced per orders. OOBTC this AM, 2 per assist.

## 2024-03-29 NOTE — ASSESSMENT & PLAN NOTE
Neuro/Psych:     - Sedation: none    - Pain:    - Scheduled Tylenol 1g q8h   -  Oxy PRN             Cardiac:     - S/P CABGx2 (LMA-LAD and SVG-OM) with Dr. Canas on 3/27/24    - BP Goal: MAP 75-85 and SBP <140    - Cleviprex PRN    - Pressors: None    - Anti-HTNs: Hydralazine PRN    - Rhythm: NSR    - Beta blocker: Lopressor 25 BID    - Statin: Atorvastatin 80 mg QD      Pulmonary:     - Goal SpO2 >92% currently on room air     - Chest Tubes x 3 (2 Meds & 1 Pleural)    - ABGs PRN  - Daily CXR      Renal:    - Trend BUN/Cr     - Maintain Dunn, record strict Is/Os  - Consider trial 10 mg IV lasix if pt able to advance diet today    Recent Labs   Lab 03/28/24  2354 03/29/24  0310 03/29/24  0612   BUN 26* 28* 27*   CREATININE 1.0 0.8 0.9           FEN / GI:     - Daily CMP, PRN K/Mag/Phos per protocol     - Replace electrolytes as needed    - Nutrition NPO sips with meds; advance to CLD if passing gas    - Bowel Regimen: miralax and add golytely today 100 ml q4h      ID:     - Afebrile    - WBC slightly up trended    - Abx: Complete perioperative cefazolin 2g Q8H x 5 doses    Recent Labs   Lab 03/27/24 2011 03/28/24  0403 03/29/24  0310   WBC 12.06 11.90 11.67           Heme/Onc:     - Hgb 16.4 pre-operatively    - CBC daily    - ASA 81 EC     Recent Labs   Lab 03/27/24 2011 03/28/24  0403 03/29/24  0310   HGB 8.9* 9.2* 8.7*    170 138*   APTT 29.0 28.9 31.9   INR 1.1 1.0 1.0           Endocrine:     - CTS Goal -140    - Endocrinology consulted for insulin management      PPx:   Feeding: NPO, sips with meds advance if passing gas today  Analgesia/Sedation: as above  Thromboembolic Prevention: SCDs  HOB >30: Yes  Stress Ulcer: famotidine  Glucose Control: Yes, insulin management per Endocrinology     Lines/Drains/Airway:   Left radial arterial line   L subclavian CVC   Dunn   Chest Tubes: 3 (2 Mediastinal, 1 Pleural)    Pacing Wires: Temporary ventricular pacing wires      Dispo/Code  Status/Palliative:     - SICU    - Full Code

## 2024-03-29 NOTE — PROGRESS NOTES
[facility] - Surgical Intensive Care  Critical Care - Surgery  Progress Note    Patient Name: Gerald Wilkinson  MRN: [id number]  Admission Date: 3/22/2024  Hospital Length of Stay: 7 days  Code Status: Full Code  Attending Provider: Tony Canas MD  Primary Care Provider: Phil Tellez MD   Principal Problem: Left main coronary artery thrombosis    Subjective:     Hospital/ICU Course:  No notes on file    Interval History/Significant Events: NAEON. HDS. Up and walking yesterday. Did not require clevi gtt. Pain controlled. Not passing gas. Increase bowel reg today.    Follow-up For: Procedure(s) (LRB):  CORONARY ARTERY BYPASS GRAFT (CABG) (N/A)  HARVEST-VEIN-ENDOVASCULAR (Left)    Post-Operative Day: 2 Days Post-Op    Objective:     Vital Signs (Most Recent):  Temp: 98.4 °F (36.9 °C) (03/28/24 1901)  Pulse: 84 (03/29/24 0500)  Resp: 18 (03/29/24 0500)  BP: 127/62 (03/29/24 0300)  SpO2: 97 % (03/29/24 0500) Vital Signs (24h Range):  Temp:  [98.4 °F (36.9 °C)-98.5 °F (36.9 °C)] 98.4 °F (36.9 °C)  Pulse:  [] 84  Resp:  [3-41] 18  SpO2:  [92 %-100 %] 97 %  BP: (102-173)/(59-79) 127/62  Arterial Line BP: (109-215)/() 136/53     Weight: 75.5 kg (166 lb 7.2 oz)  Body mass index is 24.58 kg/m².      Intake/Output Summary (Last 24 hours) at 3/29/2024 0557  Last data filed at 3/29/2024 0500  Gross per 24 hour   Intake 285.43 ml   Output 2220 ml   Net -1934.57 ml          Physical Exam  Vitals and nursing note reviewed.   Constitutional:       Appearance: Normal appearance.      Interventions: He is sedated and intubated.   HENT:      Mouth/Throat:      Mouth: Mucous membranes are dry.      Pharynx: Oropharynx is clear.   Eyes:      Extraocular Movements: Extraocular movements intact.      Pupils: Pupils are equal, round, and reactive to light.   Cardiovascular:      Rate and Rhythm: Normal rate. Rhythm irregular.      Pulses: Normal pulses.      Comments: Left radial arterial line  Ventricular pacing wires  Please go to the lab and x-ray on the first floor before you leave today.     Urology [phone] ([facility] [place], 4th Floor N)         Pulmonary:      Effort: No respiratory distress. He is intubated.      Comments: 2 Mediastinal and 1 pleural chest tube  Abdominal:      General: There is distension.      Tenderness: There is no abdominal tenderness. There is no guarding.   Genitourinary:     Comments: Dunn in place   Musculoskeletal:      Cervical back: Neck supple.      Right lower leg: No edema.      Left lower leg: No edema.   Skin:     General: Skin is warm and dry.      Capillary Refill: Capillary refill takes less than 2 seconds.      Comments: MSI CDI   Neurological:      Mental Status: He is alert.            Vents:       Lines/Drains/Airways       Central Venous Catheter Line  Duration             Percutaneous Central Line - Triple Lumen  03/27/24 Subclavian Left 2 days    Introducer 03/27/24 0842 1 day              Drain  Duration                  Chest Tube 03/27/24 1211 Tube - 3 Left Pleural 32 Fr. 1 day         Chest Tube 03/27/24 1331 Left Mediastinal 1 day         Chest Tube 03/27/24 1331 Right Mediastinal 1 day         Urethral Catheter 03/27/24 0823 Non-latex;Straight-tip;Temperature probe 16 Fr. 1 day              Arterial Line  Duration             Arterial Line 03/27/24 0840 Left Radial 1 day              Line  Duration                  Pacer Wires 03/27/24 1200 1 day              Peripheral Intravenous Line  Duration                  Peripheral IV - Single Lumen 03/27/24 0818 14 G  Left Upper Arm 1 day                    Significant Labs:    CBC/Anemia Profile:  Recent Labs   Lab 03/27/24 2011 03/27/24 2013 03/28/24  0403 03/29/24  0310   WBC 12.06  --  11.90 11.67   HGB 8.9*  --  9.2* 8.7*   HCT 26.7* 26* 27.6* 26.2*     --  170 138*   MCV 89  --  91 91   RDW 12.6  --  12.8 13.2        Chemistries:  Recent Labs   Lab 03/27/24 2011 03/28/24  0403 03/28/24  1108 03/28/24  1710 03/28/24  2354 03/29/24  0310   NA  --  139   < > 138 140 138   K  --  4.1   < > 4.0 4.1 3.9   CL  --  107   < > 106 109 107   CO2  --  19*    < > 23 25 25   BUN  --  19   < > 22 26* 28*   CREATININE  --  0.9   < > 1.2 1.0 0.8   CALCIUM  --  8.5*   < > 8.8 8.9 8.7   ALBUMIN  --  4.1   < > 4.0 3.5 3.5   PROT  --  6.3   < > 6.5 5.9* 5.9*   BILITOT  --  0.6   < > 0.8 0.8 0.8   ALKPHOS  --  26*   < > 31* 30* 32*   ALT  --  30   < > 24 22 21   AST  --  45*   < > 37 35 33   MG 2.3 2.4  --   --   --  2.6   PHOS 3.8 3.3  --   --   --  2.1*    < > = values in this interval not displayed.       All pertinent labs within the past 24 hours have been reviewed.    Significant Imaging:  I have reviewed all pertinent imaging results/findings within the past 24 hours.  Assessment/Plan:     Cardiac/Vascular  * Left main coronary artery thrombosis      Neuro/Psych:     - Sedation: none    - Pain:    - Scheduled Tylenol 1g q8h   -  Oxy PRN             Cardiac:     - S/P CABGx2 (LMA-LAD and SVG-OM) with Dr. Canas on 3/27/24    - BP Goal: MAP 75-85 and SBP <140    - Cleviprex PRN    - Pressors: None    - Anti-HTNs: Hydralazine PRN    - Rhythm: NSR    - Beta blocker: Lopressor 25 BID    - Statin: Atorvastatin 80 mg QD      Pulmonary:     - Goal SpO2 >92% currently on room air     - Chest Tubes x 3 (2 Meds & 1 Pleural)    - ABGs PRN  - Daily CXR      Renal:    - Trend BUN/Cr     - Maintain Dunn, record strict Is/Os  - Consider trial 10 mg IV lasix if pt able to advance diet today    Recent Labs   Lab 03/28/24  2354 03/29/24  0310 03/29/24  0612   BUN 26* 28* 27*   CREATININE 1.0 0.8 0.9           FEN / GI:     - Daily CMP, PRN K/Mag/Phos per protocol     - Replace electrolytes as needed    - Nutrition NPO sips with meds; advance to CLD if passing gas    - Bowel Regimen: miralax and add golytely today 100 ml q4h      ID:     - Afebrile    - WBC slightly up trended    - Abx: Complete perioperative cefazolin 2g Q8H x 5 doses    Recent Labs   Lab 03/27/24 2011 03/28/24  0403 03/29/24  0310   WBC 12.06 11.90 11.67           Heme/Onc:     - Hgb 16.4 pre-operatively    - CBC daily     - ASA 81 EC     Recent Labs   Lab 03/27/24 2011 03/28/24  0403 03/29/24  0310   HGB 8.9* 9.2* 8.7*    170 138*   APTT 29.0 28.9 31.9   INR 1.1 1.0 1.0           Endocrine:     - CTS Goal -140    - Endocrinology consulted for insulin management      PPx:   Feeding: NPO, sips with meds advance if passing gas today  Analgesia/Sedation: as above  Thromboembolic Prevention: SCDs  HOB >30: Yes  Stress Ulcer: famotidine  Glucose Control: Yes, insulin management per Endocrinology     Lines/Drains/Airway:   Left radial arterial line   L subclavian CVC   Dunn   Chest Tubes: 3 (2 Mediastinal, 1 Pleural)    Pacing Wires: Temporary ventricular pacing wires      Dispo/Code Status/Palliative:     - SICU    - Full Code         Critical care was time spent personally by me on the following activities: development of treatment plan with patient or surrogate and bedside caregivers, discussions with consultants, evaluation of patient's response to treatment, examination of patient, ordering and performing treatments and interventions, ordering and review of laboratory studies, ordering and review of radiographic studies, pulse oximetry, re-evaluation of patient's condition.  This critical care time did not overlap with that of any other provider or involve time for any procedures.     Sinan Swann,   Critical Care - Surgery  [facility] - Surgical Intensive Care

## 2024-03-29 NOTE — NURSING
[facility] 73  coding opportunities       Chart reviewed, no opportunity found:   [address]        Patients Insurance     Medicare Insurance: [employer] Advant[employer] Report given to receiving RN; Pt plan of care discussed; Pt VSS; Pt has no C/O pain at this time; Pt bed locked + lowered, Srx3, + call bell within reach.

## 2024-03-30 LAB
ALBUMIN SERPL BCP-MCNC: 3.3 G/DL (ref 3.5–5.2)
ALBUMIN SERPL BCP-MCNC: 3.5 G/DL (ref 3.5–5.2)
ALP SERPL-CCNC: 33 U/L (ref 55–135)
ALP SERPL-CCNC: 37 U/L (ref 55–135)
ALT SERPL W/O P-5'-P-CCNC: 19 U/L (ref 10–44)
ALT SERPL W/O P-5'-P-CCNC: 20 U/L (ref 10–44)
ANION GAP SERPL CALC-SCNC: 10 MMOL/L (ref 8–16)
ANION GAP SERPL CALC-SCNC: 6 MMOL/L (ref 8–16)
APTT PPP: 30.5 SEC (ref 21–32)
AST SERPL-CCNC: 24 U/L (ref 10–40)
AST SERPL-CCNC: 25 U/L (ref 10–40)
BASOPHILS # BLD AUTO: 0.02 K/UL (ref 0–0.2)
BASOPHILS NFR BLD: 0.2 % (ref 0–1.9)
BILIRUB SERPL-MCNC: 0.5 MG/DL (ref 0.1–1)
BILIRUB SERPL-MCNC: 0.6 MG/DL (ref 0.1–1)
BLD PROD TYP BPU: NORMAL
BLOOD UNIT EXPIRATION DATE: NORMAL
BLOOD UNIT TYPE CODE: 600
BLOOD UNIT TYPE: NORMAL
BUN SERPL-MCNC: 32 MG/DL (ref 8–23)
BUN SERPL-MCNC: 33 MG/DL (ref 8–23)
CALCIUM SERPL-MCNC: 8.6 MG/DL (ref 8.7–10.5)
CALCIUM SERPL-MCNC: 8.7 MG/DL (ref 8.7–10.5)
CHLORIDE SERPL-SCNC: 105 MMOL/L (ref 95–110)
CHLORIDE SERPL-SCNC: 107 MMOL/L (ref 95–110)
CO2 SERPL-SCNC: 25 MMOL/L (ref 23–29)
CO2 SERPL-SCNC: 26 MMOL/L (ref 23–29)
CODING SYSTEM: NORMAL
CREAT SERPL-MCNC: 0.8 MG/DL (ref 0.5–1.4)
CREAT SERPL-MCNC: 0.9 MG/DL (ref 0.5–1.4)
CROSSMATCH INTERPRETATION: NORMAL
DIFFERENTIAL METHOD BLD: ABNORMAL
DISPENSE STATUS: NORMAL
EOSINOPHIL # BLD AUTO: 0 K/UL (ref 0–0.5)
EOSINOPHIL NFR BLD: 0.1 % (ref 0–8)
ERYTHROCYTE [DISTWIDTH] IN BLOOD BY AUTOMATED COUNT: 13.2 % (ref 11.5–14.5)
EST. GFR  (NO RACE VARIABLE): >60 ML/MIN/1.73 M^2
EST. GFR  (NO RACE VARIABLE): >60 ML/MIN/1.73 M^2
GLUCOSE SERPL-MCNC: 103 MG/DL (ref 70–110)
GLUCOSE SERPL-MCNC: 134 MG/DL (ref 70–110)
HCT VFR BLD AUTO: 27.2 % (ref 40–54)
HGB BLD-MCNC: 8.9 G/DL (ref 14–18)
IMM GRANULOCYTES # BLD AUTO: 0.03 K/UL (ref 0–0.04)
IMM GRANULOCYTES NFR BLD AUTO: 0.3 % (ref 0–0.5)
INR PPP: 1 (ref 0.8–1.2)
LYMPHOCYTES # BLD AUTO: 1.1 K/UL (ref 1–4.8)
LYMPHOCYTES NFR BLD: 9.8 % (ref 18–48)
MAGNESIUM SERPL-MCNC: 2.9 MG/DL (ref 1.6–2.6)
MCH RBC QN AUTO: 30 PG (ref 27–31)
MCHC RBC AUTO-ENTMCNC: 32.7 G/DL (ref 32–36)
MCV RBC AUTO: 92 FL (ref 82–98)
MONOCYTES # BLD AUTO: 0.7 K/UL (ref 0.3–1)
MONOCYTES NFR BLD: 5.9 % (ref 4–15)
NEUTROPHILS # BLD AUTO: 9.3 K/UL (ref 1.8–7.7)
NEUTROPHILS NFR BLD: 83.7 % (ref 38–73)
NRBC BLD-RTO: 0 /100 WBC
NUM UNITS TRANS PACKED RBC: NORMAL
PHOSPHATE SERPL-MCNC: 2.3 MG/DL (ref 2.7–4.5)
PLATELET # BLD AUTO: 148 K/UL (ref 150–450)
PMV BLD AUTO: 11.7 FL (ref 9.2–12.9)
POCT GLUCOSE: 119 MG/DL (ref 70–110)
POTASSIUM SERPL-SCNC: 4.1 MMOL/L (ref 3.5–5.1)
POTASSIUM SERPL-SCNC: 4.1 MMOL/L (ref 3.5–5.1)
PROT SERPL-MCNC: 5.7 G/DL (ref 6–8.4)
PROT SERPL-MCNC: 5.9 G/DL (ref 6–8.4)
PROTHROMBIN TIME: 10.3 SEC (ref 9–12.5)
RBC # BLD AUTO: 2.97 M/UL (ref 4.6–6.2)
SODIUM SERPL-SCNC: 139 MMOL/L (ref 136–145)
SODIUM SERPL-SCNC: 140 MMOL/L (ref 136–145)
WBC # BLD AUTO: 11.12 K/UL (ref 3.9–12.7)

## 2024-03-30 PROCEDURE — 84100 ASSAY OF PHOSPHORUS: CPT | Performed by: STUDENT IN AN ORGANIZED HEALTH CARE EDUCATION/TRAINING PROGRAM

## 2024-03-30 PROCEDURE — 80053 COMPREHEN METABOLIC PANEL: CPT

## 2024-03-30 PROCEDURE — 25000003 PHARM REV CODE 250

## 2024-03-30 PROCEDURE — 80053 COMPREHEN METABOLIC PANEL: CPT | Mod: 91 | Performed by: STUDENT IN AN ORGANIZED HEALTH CARE EDUCATION/TRAINING PROGRAM

## 2024-03-30 PROCEDURE — 85610 PROTHROMBIN TIME: CPT | Performed by: INTERNAL MEDICINE

## 2024-03-30 PROCEDURE — 25000003 PHARM REV CODE 250: Performed by: STUDENT IN AN ORGANIZED HEALTH CARE EDUCATION/TRAINING PROGRAM

## 2024-03-30 PROCEDURE — 83735 ASSAY OF MAGNESIUM: CPT | Performed by: STUDENT IN AN ORGANIZED HEALTH CARE EDUCATION/TRAINING PROGRAM

## 2024-03-30 PROCEDURE — 85730 THROMBOPLASTIN TIME PARTIAL: CPT | Performed by: STUDENT IN AN ORGANIZED HEALTH CARE EDUCATION/TRAINING PROGRAM

## 2024-03-30 PROCEDURE — 20600001 HC STEP DOWN PRIVATE ROOM

## 2024-03-30 PROCEDURE — 85025 COMPLETE CBC W/AUTO DIFF WBC: CPT | Performed by: STUDENT IN AN ORGANIZED HEALTH CARE EDUCATION/TRAINING PROGRAM

## 2024-03-30 PROCEDURE — 36415 COLL VENOUS BLD VENIPUNCTURE: CPT

## 2024-03-30 RX ORDER — FAMOTIDINE 20 MG/1
20 TABLET, FILM COATED ORAL 2 TIMES DAILY
Status: DISCONTINUED | OUTPATIENT
Start: 2024-03-30 | End: 2024-03-31 | Stop reason: HOSPADM

## 2024-03-30 RX ADMIN — FAMOTIDINE 20 MG: 20 TABLET ORAL at 08:03

## 2024-03-30 RX ADMIN — METOPROLOL TARTRATE 75 MG: 50 TABLET, FILM COATED ORAL at 09:03

## 2024-03-30 RX ADMIN — ACETAMINOPHEN 1000 MG: 500 TABLET ORAL at 01:03

## 2024-03-30 RX ADMIN — METOPROLOL TARTRATE 75 MG: 50 TABLET, FILM COATED ORAL at 08:03

## 2024-03-30 RX ADMIN — POLYETHYLENE GLYCOL 3350 17 G: 17 POWDER, FOR SOLUTION ORAL at 08:03

## 2024-03-30 RX ADMIN — QUETIAPINE FUMARATE 25 MG: 25 TABLET ORAL at 09:03

## 2024-03-30 RX ADMIN — ATORVASTATIN CALCIUM 80 MG: 40 TABLET, FILM COATED ORAL at 08:03

## 2024-03-30 RX ADMIN — FAMOTIDINE 20 MG: 20 TABLET ORAL at 09:03

## 2024-03-30 RX ADMIN — ACETAMINOPHEN 1000 MG: 500 TABLET ORAL at 09:03

## 2024-03-30 RX ADMIN — TAMSULOSIN HYDROCHLORIDE 0.4 MG: 0.4 CAPSULE ORAL at 08:03

## 2024-03-30 RX ADMIN — ASPIRIN 81 MG: 81 TABLET, COATED ORAL at 08:03

## 2024-03-30 NOTE — ASSESSMENT & PLAN NOTE
73M with PMH of HTN admitted following positive stress test and found to have multivessel CAD including LM disease on [facility]. Noted to have paroxysmal afib while admitted but sinus rhythm perioperatively. He is now s/p CABG x2 (LIMA-LAD, SVG-OM2) and resection of left atrial appendage on 3/27/24.    - ASA, statin, BB  - Maintain sternal precautions   - Appears euvolemic, room air, holding diuresis  - Multimodal pain management   - Encourage ambulation  - PT/OT  - Cardiac diet with 1500 cc fluid restriction   - Bowel regimen in place   - Pepcid for ulcer prevention   - Pacer wires and chest tubes removed this morning  - Monitor electrolytes to keep Mag above 2 and Potassium above 4  - OOBTC  - Encourage IS use  - Flomax

## 2024-03-30 NOTE — CARE UPDATE
"-Glucose Goal 110-140    -A1C: No results found for: "HGBA1C"      -HOME REGIMEN: non-diabetic    -GLUCOSE TREND FOR THE PAST 24HRS:   Recent Labs   Lab 03/28/24  2152 03/29/24  0612 03/29/24  1112 03/29/24  1724 03/29/24 2020 03/30/24  0726   POCTGLUCOSE 126* 118* 132* 150* 136* 119*           -NO HYPOGYCEMIAS NOTED     - Diet - Diet Cardiac Fluid - 1500mL    -Steroids - No    -Tube Feeds - No    Remains in SICU. S/p CABG on 3/27. BG well controlled and within goal ranges with no prn SQ insulin requirements.      Plan:   - Discontinue Moderate Dose Correction 150/25 and POCT Glucose every 4 hours  -Recommend continuing to monitor BG with AM labs         Endocrine to sign off at this time. Patient with no insulin needs in > 24 hrs while tolerating diet and no hx of DM. Please reconsult if needed.         "

## 2024-03-30 NOTE — PROGRESS NOTES
[facility] - Cardiology Stepdown  Cardiothoracic Surgery  Progress Note    Patient Name: Gerald Wilkinson  MRN: [id number]  Admission Date: 3/22/2024  Hospital Length of Stay: 8 days  Code Status: Full Code   Attending Physician: Tony Canas MD   Referring Provider: Self, Aaareferral  Principal Problem:Left main coronary artery thrombosis      Subjective:     Post-Op Info:  Procedure(s) (LRB):  CORONARY ARTERY BYPASS GRAFT (CABG) (N/A)  HARVEST-VEIN-ENDOVASCULAR (Left)   3 Days Post-Op     Interval History: Stepped down yesterday afternoon. Multiple bowel movements and passing flatus. Denies feeling bloated or nauseated. Pain well controlled. Ambulating.       Medications:  Continuous Infusions:  Scheduled Meds:   acetaminophen  1,000 mg Oral Q8H    aspirin  81 mg Oral Daily    atorvastatin  80 mg Oral Daily    famotidine  20 mg Oral BID    metoprolol tartrate  75 mg Oral BID    polyethylene glycol  17 g Oral Daily    tamsulosin  0.4 mg Oral Daily     PRN Meds:0.9%  NaCl infusion (for blood administration), sodium chloride 0.9%, albuterol-ipratropium, bisacodyL, dextrose 10%, dextrose 10%, melatonin, naloxone, ondansetron, oxyCODONE, oxyCODONE, prochlorperazine, QUEtiapine, sodium chloride 0.9%     Objective:     Vital Signs (Most Recent):  Temp: 98.2 °F (36.8 °C) (03/30/24 0721)  Pulse: 92 (03/30/24 0721)  Resp: 18 (03/30/24 0721)  BP: (!) 160/72 (03/30/24 0721)  SpO2: 97 % (03/30/24 0721) Vital Signs (24h Range):  Temp:  [97.8 °F (36.6 °C)-98.5 °F (36.9 °C)] 98.2 °F (36.8 °C)  Pulse:  [] 92  Resp:  [18-29] 18  SpO2:  [94 %-100 %] 97 %  BP: (103-170)/(61-79) 160/72     Weight: 75.5 kg (166 lb 7.2 oz)  Body mass index is 24.58 kg/m².    SpO2: 97 %       Intake/Output - Last 3 Shifts         03/28 0700 03/29 0659 03/29 0700 03/30 0659 03/30 0700 03/31 0659    P.O. 225 711 222    I.V. (mL/kg) 55.9 (0.7) 9.4 (0.1)     Blood       IV Piggyback 48.6 348.7     Total Intake(mL/kg) 329.4 (4.4) 1069.1 (14.2) 222 (2.9)     Urine (mL/kg/hr) 1600 (0.9) 540 (0.3) 225 (0.8)    Emesis/NG output  0     Stool  0     Chest Tube 510 240 0    Total Output 2110 780 225    Net -1780.6 +289.1 -3           Stool Occurrence  4 x     Emesis Occurrence  0 x             Lines/Drains/Airways       Line  Duration                  Pacer Wires 03/27/24 1200 2 days              Peripheral Intravenous Line  Duration                  Peripheral IV - Single Lumen 03/27/24 0818 14 G  Left Upper Arm 3 days         Peripheral IV - Single Lumen 03/29/24 1015 20 G Anterior;Left;Proximal Forearm 1 day                     Physical Exam  Vitals reviewed.   Constitutional:       General: He is not in acute distress.  Cardiovascular:      Rate and Rhythm: Normal rate and regular rhythm.      Comments: MSI dressing CDI,  serosanguinous  Pulmonary:      Effort: No respiratory distress.   Abdominal:      Comments: Mildly distended, soft, non-tender   Skin:     General: Skin is warm and dry.   Neurological:      General: No focal deficit present.      Mental Status: He is alert and oriented to person, place, and time.            Significant Labs:  All pertinent labs from the last 24 hours have been reviewed.    Significant Diagnostics:  I have reviewed all pertinent imaging results/findings within the past 24 hours.  Assessment/Plan:     * Left main coronary artery thrombosis  73M with PMH of HTN admitted following positive stress test and found to have multivessel CAD including LM disease on [facility]. Noted to have paroxysmal afib while admitted but sinus rhythm perioperatively. He is now s/p CABG x2 (LIMA-LAD, SVG-OM2) and resection of left atrial appendage on 3/27/24.    - ASA, statin, BB  - Maintain sternal precautions   - Appears euvolemic, room air, holding diuresis  - Multimodal pain management   - Encourage ambulation  - PT/OT  - Cardiac diet with 1500 cc fluid restriction   - Bowel regimen in place   - Pepcid for ulcer prevention   - Pacer wires and chest tubes  removed this morning  - Monitor electrolytes to keep Mag above 2 and Potassium above 4  - OOBTC  - Encourage IS use  - Flomax      Stress hyperglycemia  Endocrine s/o    Paroxysmal atrial fibrillation with rapid ventricular response  Sinus rhythm  S/p resection of left atrial appendage    Gastroesophageal reflux disease without esophagitis  Ean Trinidad, DO  Cardiothoracic Surgery  [facility] - Cardiology Stepdown

## 2024-03-30 NOTE — SUBJECTIVE & OBJECTIVE
Interval History: Stepped down yesterday afternoon. Multiple bowel movements and passing flatus. Denies feeling bloated or nauseated. Pain well controlled. Ambulating.       Medications:  Continuous Infusions:  Scheduled Meds:   acetaminophen  1,000 mg Oral Q8H    aspirin  81 mg Oral Daily    atorvastatin  80 mg Oral Daily    famotidine  20 mg Oral BID    metoprolol tartrate  75 mg Oral BID    polyethylene glycol  17 g Oral Daily    tamsulosin  0.4 mg Oral Daily     PRN Meds:0.9%  NaCl infusion (for blood administration), sodium chloride 0.9%, albuterol-ipratropium, bisacodyL, dextrose 10%, dextrose 10%, melatonin, naloxone, ondansetron, oxyCODONE, oxyCODONE, prochlorperazine, QUEtiapine, sodium chloride 0.9%     Objective:     Vital Signs (Most Recent):  Temp: 98.2 °F (36.8 °C) (03/30/24 0721)  Pulse: 92 (03/30/24 0721)  Resp: 18 (03/30/24 0721)  BP: (!) 160/72 (03/30/24 0721)  SpO2: 97 % (03/30/24 0721) Vital Signs (24h Range):  Temp:  [97.8 °F (36.6 °C)-98.5 °F (36.9 °C)] 98.2 °F (36.8 °C)  Pulse:  [] 92  Resp:  [18-29] 18  SpO2:  [94 %-100 %] 97 %  BP: (103-170)/(61-79) 160/72     Weight: 75.5 kg (166 lb 7.2 oz)  Body mass index is 24.58 kg/m².    SpO2: 97 %       Intake/Output - Last 3 Shifts         03/28 0700 03/29 0659 03/29 0700 03/30 0659 03/30 0700 03/31 0659    P.O. 225 711 222    I.V. (mL/kg) 55.9 (0.7) 9.4 (0.1)     Blood       IV Piggyback 48.6 348.7     Total Intake(mL/kg) 329.4 (4.4) 1069.1 (14.2) 222 (2.9)    Urine (mL/kg/hr) 1600 (0.9) 540 (0.3) 225 (0.8)    Emesis/NG output  0     Stool  0     Chest Tube 510 240 0    Total Output 2110 780 225    Net -1780.6 +289.1 -3           Stool Occurrence  4 x     Emesis Occurrence  0 x             Lines/Drains/Airways       Line  Duration                  Pacer Wires 03/27/24 1200 2 days              Peripheral Intravenous Line  Duration                  Peripheral IV - Single Lumen 03/27/24 0818 14 G  Left Upper Arm 3 days         Peripheral IV -  Single Lumen 03/29/24 1015 20 G Anterior;Left;Proximal Forearm 1 day                     Physical Exam  Vitals reviewed.   Constitutional:       General: He is not in acute distress.  Cardiovascular:      Rate and Rhythm: Normal rate and regular rhythm.      Comments: MSI dressing CDI,  serosanguinous  Pulmonary:      Effort: No respiratory distress.   Abdominal:      Comments: Mildly distended, soft, non-tender   Skin:     General: Skin is warm and dry.   Neurological:      General: No focal deficit present.      Mental Status: He is alert and oriented to person, place, and time.            Significant Labs:  All pertinent labs from the last 24 hours have been reviewed.    Significant Diagnostics:  I have reviewed all pertinent imaging results/findings within the past 24 hours.

## 2024-03-30 NOTE — NURSING
"Pt's wife, Shanna, called this RN to have a private conversation regarding patient - stating that the pt revealed to his son at bedside that he is "so afraid I'm going to die".  Pt has denied any pain and has repeatedly expressed that he's ready to be discharged to this RN, but Shanna states that the pt is lying and telling the providers what they want to hear so that they will discharge him tomorrow.  Pt's wife requested that this RN write a private note to communicate to the providers that she does not want the pt to be discharged tomorrow in this state of mind.  Shanna has also expressed that the pt "definitely" needs to be discharged with prescribed anti-anxiety medication.  Pt's wife has her own business and this is her busiest week of the year and she is not yet ready to assume care.  Shanna apologized on behalf of the patient for any rudeness that he has exhibited to staff - she states this experience has turned the patient into "a completely different person" and "not himself".  BRIDGETTE RN  "

## 2024-03-31 VITALS
WEIGHT: 166.44 LBS | RESPIRATION RATE: 20 BRPM | SYSTOLIC BLOOD PRESSURE: 133 MMHG | OXYGEN SATURATION: 98 % | BODY MASS INDEX: 24.65 KG/M2 | TEMPERATURE: 98 F | HEIGHT: 69 IN | DIASTOLIC BLOOD PRESSURE: 67 MMHG | HEART RATE: 81 BPM

## 2024-03-31 LAB
ALBUMIN SERPL BCP-MCNC: 3.4 G/DL (ref 3.5–5.2)
ALP SERPL-CCNC: 40 U/L (ref 55–135)
ALT SERPL W/O P-5'-P-CCNC: 21 U/L (ref 10–44)
ANION GAP SERPL CALC-SCNC: 10 MMOL/L (ref 8–16)
AST SERPL-CCNC: 22 U/L (ref 10–40)
BASOPHILS # BLD AUTO: 0.03 K/UL (ref 0–0.2)
BASOPHILS NFR BLD: 0.4 % (ref 0–1.9)
BILIRUB SERPL-MCNC: 0.7 MG/DL (ref 0.1–1)
BUN SERPL-MCNC: 31 MG/DL (ref 8–23)
CALCIUM SERPL-MCNC: 8.6 MG/DL (ref 8.7–10.5)
CHLORIDE SERPL-SCNC: 106 MMOL/L (ref 95–110)
CO2 SERPL-SCNC: 25 MMOL/L (ref 23–29)
CREAT SERPL-MCNC: 0.9 MG/DL (ref 0.5–1.4)
DIFFERENTIAL METHOD BLD: ABNORMAL
EOSINOPHIL # BLD AUTO: 0.2 K/UL (ref 0–0.5)
EOSINOPHIL NFR BLD: 2.5 % (ref 0–8)
ERYTHROCYTE [DISTWIDTH] IN BLOOD BY AUTOMATED COUNT: 13.2 % (ref 11.5–14.5)
EST. GFR  (NO RACE VARIABLE): >60 ML/MIN/1.73 M^2
GLUCOSE SERPL-MCNC: 95 MG/DL (ref 70–110)
HCT VFR BLD AUTO: 28.3 % (ref 40–54)
HGB BLD-MCNC: 9.2 G/DL (ref 14–18)
IMM GRANULOCYTES # BLD AUTO: 0.04 K/UL (ref 0–0.04)
IMM GRANULOCYTES NFR BLD AUTO: 0.5 % (ref 0–0.5)
LYMPHOCYTES # BLD AUTO: 1 K/UL (ref 1–4.8)
LYMPHOCYTES NFR BLD: 12.2 % (ref 18–48)
MAGNESIUM SERPL-MCNC: 2.7 MG/DL (ref 1.6–2.6)
MCH RBC QN AUTO: 30 PG (ref 27–31)
MCHC RBC AUTO-ENTMCNC: 32.5 G/DL (ref 32–36)
MCV RBC AUTO: 92 FL (ref 82–98)
MONOCYTES # BLD AUTO: 0.9 K/UL (ref 0.3–1)
MONOCYTES NFR BLD: 10.1 % (ref 4–15)
NEUTROPHILS # BLD AUTO: 6.4 K/UL (ref 1.8–7.7)
NEUTROPHILS NFR BLD: 74.3 % (ref 38–73)
NRBC BLD-RTO: 0 /100 WBC
PHOSPHATE SERPL-MCNC: 2.4 MG/DL (ref 2.7–4.5)
PLATELET # BLD AUTO: 194 K/UL (ref 150–450)
PMV BLD AUTO: 11.7 FL (ref 9.2–12.9)
POTASSIUM SERPL-SCNC: 3.6 MMOL/L (ref 3.5–5.1)
PROT SERPL-MCNC: 5.8 G/DL (ref 6–8.4)
RBC # BLD AUTO: 3.07 M/UL (ref 4.6–6.2)
SODIUM SERPL-SCNC: 141 MMOL/L (ref 136–145)
WBC # BLD AUTO: 8.53 K/UL (ref 3.9–12.7)

## 2024-03-31 PROCEDURE — 25000003 PHARM REV CODE 250

## 2024-03-31 PROCEDURE — 25000003 PHARM REV CODE 250: Performed by: STUDENT IN AN ORGANIZED HEALTH CARE EDUCATION/TRAINING PROGRAM

## 2024-03-31 PROCEDURE — 80053 COMPREHEN METABOLIC PANEL: CPT | Performed by: STUDENT IN AN ORGANIZED HEALTH CARE EDUCATION/TRAINING PROGRAM

## 2024-03-31 PROCEDURE — 94761 N-INVAS EAR/PLS OXIMETRY MLT: CPT

## 2024-03-31 PROCEDURE — 83735 ASSAY OF MAGNESIUM: CPT | Performed by: STUDENT IN AN ORGANIZED HEALTH CARE EDUCATION/TRAINING PROGRAM

## 2024-03-31 PROCEDURE — 85025 COMPLETE CBC W/AUTO DIFF WBC: CPT | Performed by: STUDENT IN AN ORGANIZED HEALTH CARE EDUCATION/TRAINING PROGRAM

## 2024-03-31 PROCEDURE — 84100 ASSAY OF PHOSPHORUS: CPT | Performed by: STUDENT IN AN ORGANIZED HEALTH CARE EDUCATION/TRAINING PROGRAM

## 2024-03-31 PROCEDURE — 36415 COLL VENOUS BLD VENIPUNCTURE: CPT | Performed by: STUDENT IN AN ORGANIZED HEALTH CARE EDUCATION/TRAINING PROGRAM

## 2024-03-31 RX ORDER — OXYCODONE HYDROCHLORIDE 5 MG/1
5 TABLET ORAL EVERY 4 HOURS PRN
Qty: 15 TABLET | Refills: 0 | Status: SHIPPED | OUTPATIENT
Start: 2024-03-31 | End: 2024-04-22 | Stop reason: ALTCHOICE

## 2024-03-31 RX ORDER — POLYETHYLENE GLYCOL 3350 17 G/17G
17 POWDER, FOR SOLUTION ORAL DAILY
Refills: 0 | COMMUNITY
Start: 2024-03-31 | End: 2024-04-22 | Stop reason: ALTCHOICE

## 2024-03-31 RX ORDER — ASPIRIN 81 MG/1
81 TABLET ORAL DAILY
Qty: 360 TABLET | Refills: 0 | Status: SHIPPED | OUTPATIENT
Start: 2024-03-31 | End: 2024-04-24 | Stop reason: SDUPTHER

## 2024-03-31 RX ORDER — ATORVASTATIN CALCIUM 80 MG/1
80 TABLET, FILM COATED ORAL DAILY
Qty: 90 TABLET | Refills: 3 | Status: SHIPPED | OUTPATIENT
Start: 2024-03-31 | End: 2024-04-24 | Stop reason: SDUPTHER

## 2024-03-31 RX ORDER — ACETAMINOPHEN 500 MG
1000 TABLET ORAL EVERY 6 HOURS PRN
Refills: 0 | COMMUNITY
Start: 2024-03-31 | End: 2024-04-22 | Stop reason: ALTCHOICE

## 2024-03-31 RX ORDER — METOPROLOL TARTRATE 75 MG/1
75 TABLET, FILM COATED ORAL 2 TIMES DAILY
Qty: 60 TABLET | Refills: 11 | Status: SHIPPED | OUTPATIENT
Start: 2024-03-31 | End: 2024-04-24 | Stop reason: SDUPTHER

## 2024-03-31 RX ADMIN — METOPROLOL TARTRATE 75 MG: 50 TABLET, FILM COATED ORAL at 08:03

## 2024-03-31 RX ADMIN — ATORVASTATIN CALCIUM 80 MG: 40 TABLET, FILM COATED ORAL at 08:03

## 2024-03-31 RX ADMIN — FAMOTIDINE 20 MG: 20 TABLET ORAL at 08:03

## 2024-03-31 RX ADMIN — ASPIRIN 81 MG: 81 TABLET, COATED ORAL at 08:03

## 2024-03-31 RX ADMIN — TAMSULOSIN HYDROCHLORIDE 0.4 MG: 0.4 CAPSULE ORAL at 08:03

## 2024-03-31 NOTE — PROGRESS NOTES
Patient is ready for discharge. Patient stable alert and oriented. IVs removed. No complaints of pain. Discussed discharge plan. Reviewed medications and side effects, appointments, and answered questions with patient and family. Purse-string sutures at CT site to remain until follow up appointment per DO Vivi. This RN escorted patient to car via wheelchair.

## 2024-03-31 NOTE — HOSPITAL COURSE
On 3/27/24, the patient was taken to the Operating Room for the above stated procedure. Please see the previously dictated operative report for complete details. Postoperatively, the patient was taken from the  Operating Room to the ICU where the vital signs were monitored and pain was kept under control. The patient was weaned from the drips and extubated in the ICU per protocol. Once hemodynamically stable, the patient was transferred to the Cardiac Step-Down floor for continued strengthening and ambulation. Post operative course was uncomplicated. On postoperative day 4, the patient was ready for discharge to home. At the time of discharge, the patient was ambulating unassisted. Pain was well controlled with oral analgesics and the patient was tolerating the diet.    MOBILITY AND ACTIVITY:   As tolerated. Patient may shower. No heavy lifting of greater than 5 pounds and no driving.     DIET:   An 1800-calorie ADA with a 1500 mL fluid restriction.     WOUND CARE INSTRUCTIONS:   Check for redness, swelling and drainage around the  incision or wound. Patient is to call for any obvious bleeding, drainage, pus from the wound, unusual problems or difficulties or temperature of greater than 101   degrees.     FOLLOW UP:   Follow up with Dr. Canas in approximately 3 weeks. Prior to this  appointment, the patient will have a chest x-ray and EKG.     Patient not placed on Ace-Inhibitor at the time of discharge due to potential for hypotension      DISCHARGE CONDITION:   At the time of discharge, the patient was in sinus rhythm and afebrile with stable vital signs.

## 2024-03-31 NOTE — HPI
Gerald Wilkinson is a 73 y.o. male with PMH of HTN admitted following positive stress test. He reports intermittent chest pain and tightness for the past 10 days. This led to outpatient stress test which was positive. He was then told to go to the ED for admission and LHC. LHC done yesterday revealved multivessel CAD including severe LM disease. He was loaded with Plavix and started on Heparin gtt. Currently the patient denies any chest pain. This morning he did go into afib w/ RVR while getting up to use the rest room.   He reports normally being very active. His work involves lifting heavy equipment routinely.      Denies tobacco, EtOH. No prior sternotomies.

## 2024-03-31 NOTE — DISCHARGE SUMMARY
[facility] - Cardiology Stepdown  Cardiothoracic Surgery  Discharge Summary      Patient Name: Gerald Wilkinson  MRN: [id number]  Admission Date: 3/22/2024  Hospital Length of Stay: 9 days  Discharge Date and Time:  03/31/2024 8:46 AM  Attending Physician: Tony Canas MD   Discharging Provider: Mando Trinidad DO  Primary Care Provider: Phil Tellez MD    HPI:   Gerald Wilkinson is a 73 y.o. male with PMH of HTN admitted following positive stress test. He reports intermittent chest pain and tightness for the past 10 days. This led to outpatient stress test which was positive. He was then told to go to the ED for admission and LHC. LHC done yesterday revealved multivessel CAD including severe LM disease. He was loaded with Plavix and started on Heparin gtt. Currently the patient denies any chest pain. This morning he did go into afib w/ RVR while getting up to use the rest room.   He reports normally being very active. His work involves lifting heavy equipment routinely.      Denies tobacco, EtOH. No prior sternotomies.     Procedure(s) (LRB):  CORONARY ARTERY BYPASS GRAFT (CABG) (N/A)  HARVEST-VEIN-ENDOVASCULAR (Left)      Indwelling Lines/Drains at time of discharge:   Lines/Drains/Airways       Line  Duration                  Pacer Wires 03/27/24 1200 3 days                  Hospital Course: On 3/27/24, the patient was taken to the Operating Room for the above stated procedure. Please see the previously dictated operative report for complete details. Postoperatively, the patient was taken from the  Operating Room to the ICU where the vital signs were monitored and pain was kept under control. The patient was weaned from the drips and extubated in the ICU per protocol. Once hemodynamically stable, the patient was transferred to the Cardiac Step-Down floor for continued strengthening and ambulation. Post operative course was uncomplicated. On postoperative day 4, the patient was ready for discharge to home. At  the time of discharge, the patient was ambulating unassisted. Pain was well controlled with oral analgesics and the patient was tolerating the diet.    MOBILITY AND ACTIVITY:   As tolerated. Patient may shower. No heavy lifting of greater than 5 pounds and no driving.     DIET:   An 1800-calorie ADA with a 1500 mL fluid restriction.     WOUND CARE INSTRUCTIONS:   Check for redness, swelling and drainage around the  incision or wound. Patient is to call for any obvious bleeding, drainage, pus from the wound, unusual problems or difficulties or temperature of greater than 101   degrees.     FOLLOW UP:   Follow up with Dr. Canas in approximately 3 weeks. Prior to this  appointment, the patient will have a chest x-ray and EKG.     Patient not placed on Ace-Inhibitor at the time of discharge due to potential for hypotension      DISCHARGE CONDITION:   At the time of discharge, the patient was in sinus rhythm and afebrile with stable vital signs.      Goals of Care Treatment Preferences:  Code Status: Full Code      Consults (From admission, onward)          Status Ordering Provider     Consult to Endocrinology  Once        Provider:  (Not yet assigned)    Completed CLOTILDE CATHERINE     Inpatient consult to Registered Dietitian/Nutritionist  Once        Provider:  (Not yet assigned)    Completed CLOTILDE CATHERINE     Inpatient consult to Cardiothoracic Surgery  Once        Provider:  (Not yet assigned)    Completed AMIRA VILLAR     Inpatient consult to Interventional Cardiology  Once        Provider:  (Not yet assigned)    Completed KILEY JEFFERY            Significant Diagnostic Studies: N/A    Pending Diagnostic Studies:       Procedure Component Value Units Date/Time    Comprehensive metabolic panel [[id number]] Collected: 03/30/24 0610    Order Status: Sent Lab Status: No result     Specimen: Blood     Specimen to Pathology, Surgery Cardiovascular [[id number]] Collected: 03/27/24 1231    Order Status: Sent Lab Status:  In process Updated: 03/27/24 2212    Specimen: Tissue             No new Assessment & Plan notes have been filed under this hospital service since the last note was generated.  Service: Cardiothoracic Surgery    Final Active Diagnoses:    Diagnosis Date Noted POA    PRINCIPAL PROBLEM:  Left main coronary artery thrombosis [I24.0] 03/23/2024 Yes    Stress hyperglycemia [R73.9] 03/27/2024 Unknown    Positive cardiac stress test [R94.39] 03/22/2024 Yes    Paroxysmal atrial fibrillation with rapid ventricular response [I48.0] 03/22/2024 Yes    Benign essential hypertension [I10] 09/05/2019 Yes    Gastroesophageal reflux disease without esophagitis [K21.9] 09/05/2019 Yes      Problems Resolved During this Admission:      Discharged Condition: good    Disposition: Home or Self Care    Follow Up: Dr. Canas in approx 3 weeks    Patient Instructions:      Diet Cardiac     Other restrictions (specify):   Order Comments: See Instructions     Notify your health care provider if you experience any of the following:  temperature >100.4     Notify your health care provider if you experience any of the following:  persistent nausea and vomiting or diarrhea     Notify your health care provider if you experience any of the following:  severe uncontrolled pain     Notify your health care provider if you experience any of the following:  redness, tenderness, or signs of infection (pain, swelling, redness, odor or green/yellow discharge around incision site)     Notify your health care provider if you experience any of the following:  difficulty breathing or increased cough     Notify your health care provider if you experience any of the following:  severe persistent headache     Notify your health care provider if you experience any of the following:  worsening rash     Notify your health care provider if you experience any of the following:  persistent dizziness, light-headedness, or visual disturbances     Notify your health care  provider if you experience any of the following:  increased confusion or weakness     Change dressing (specify)   Order Comments: May remove mid-sternal dressing on 4/3/24     Medications:  Reconciled Home Medications:      Medication List        START taking these medications      acetaminophen 500 MG tablet  Commonly known as: TYLENOL  Take 2 tablets (1,000 mg total) by mouth every 6 (six) hours as needed for Pain.     aspirin 81 MG EC tablet  Commonly known as: ECOTRIN  Take 1 tablet (81 mg total) by mouth once daily.     atorvastatin 80 MG tablet  Commonly known as: LIPITOR  Take 1 tablet (80 mg total) by mouth once daily.     metoprolol tartrate 75 mg Tab  Take 1 tablet (75 mg total) by mouth 2 (two) times daily.     oxyCODONE 5 MG immediate release tablet  Commonly known as: ROXICODONE  Take 1 tablet (5 mg total) by mouth every 4 (four) hours as needed for Pain.     polyethylene glycol 17 gram Pwpk  Commonly known as: GLYCOLAX  Take 17 g by mouth once daily. . Hold if multiple loose stools develop. for 7 days            CONTINUE taking these medications      multivitamin capsule  Take 1 capsule by mouth once daily.            STOP taking these medications      valsartan 160 MG tablet  Commonly known as: DIOVAN            Time spent on the discharge of patient: 35 minutes    Mando Trinidad DO  Cardiothoracic Surgery  [facility] - [facility]

## 2024-04-01 ENCOUNTER — TELEPHONE (OUTPATIENT)
Dept: CARDIOTHORACIC SURGERY | Facility: CLINIC | Age: 74
End: 2024-04-01
Payer: MEDICARE

## 2024-04-01 LAB
FINAL PATHOLOGIC DIAGNOSIS: NORMAL
GROSS: NORMAL
Lab: NORMAL

## 2024-04-01 NOTE — TELEPHONE ENCOUNTER
Called pt following hospital discharge.  Instructed pt to remove island dressing to his mid-sternal incision and reiterated the need for pt to wash his incisions everyday with soap and water, refraining from the application of anything other than soap and water to his incisions, and to pat them dry with a clean towel, leaving them open to air.  Pt instructed to walk as much as he can throughout each day.  Reminded pt not to drive for the first 4 weeks after surgery, and to refrain from lifting, pushing, and pulling anything greater than 5 pounds for the first 6 weeks following his surgery.  Instructed pt to perform daily weights.  Pt instructed to notify the clinic if he has a weight gain greater than 3 pounds in one day.  Pt instructed to continue utilizing his Incentive Spirometer to expand his lungs and prevent pneumonia; pt stated that he does not have an IS at home.  Pt instructed to perform deep breathing exercises at least every 2 hours while awake, which pt verbalized understanding to.  Pt informed about his post-op appts on 4/22.  Pt instructed to call the clinic with any questions or concerns.  Pt verbalized understanding to all instructions and denied having questions at this time.

## 2024-04-18 NOTE — PROGRESS NOTES
Patient seen and examined. Patient is progressively increasing activity. No significant complaints.     Sternum: stable, incision CDI  Chest xray: Acceptable post op chest  EKG: NSR     Assessment:  S.P CABG & resection of JHON 3/27/24    Plan:  Can begin driving as long as he has power steering  Can begin cardiac rehab in the next couple of weeks  We will refer to cardiology to assume care - Dr. Neno lane DC potassium        No scheduled appointment, RTC prn

## 2024-04-22 ENCOUNTER — HOSPITAL ENCOUNTER (OUTPATIENT)
Dept: RADIOLOGY | Facility: HOSPITAL | Age: 74
Discharge: HOME | End: 2024-04-22
Attending: THORACIC SURGERY (CARDIOTHORACIC VASCULAR SURGERY)
Payer: MEDICARE

## 2024-04-22 ENCOUNTER — DOCUMENTATION ONLY (OUTPATIENT)
Dept: CARDIOTHORACIC SURGERY | Facility: CLINIC | Age: 74
End: 2024-04-22

## 2024-04-22 ENCOUNTER — OFFICE VISIT (OUTPATIENT)
Dept: CARDIOTHORACIC SURGERY | Facility: CLINIC | Age: 74
End: 2024-04-22
Attending: THORACIC SURGERY (CARDIOTHORACIC VASCULAR SURGERY)
Payer: MEDICARE

## 2024-04-22 ENCOUNTER — TELEPHONE (OUTPATIENT)
Dept: CARDIAC REHAB | Facility: CLINIC | Age: 74
End: 2024-04-22
Payer: MEDICARE

## 2024-04-22 ENCOUNTER — HOSPITAL ENCOUNTER (OUTPATIENT)
Dept: CARDIOLOGY | Facility: CLINIC | Age: 74
Discharge: HOME | End: 2024-04-22
Attending: THORACIC SURGERY (CARDIOTHORACIC VASCULAR SURGERY)
Payer: MEDICARE

## 2024-04-22 VITALS
HEIGHT: 69 IN | OXYGEN SATURATION: 100 % | SYSTOLIC BLOOD PRESSURE: 160 MMHG | DIASTOLIC BLOOD PRESSURE: 73 MMHG | HEART RATE: 78 BPM | WEIGHT: 157.44 LBS | BODY MASS INDEX: 23.32 KG/M2

## 2024-04-22 DIAGNOSIS — Z95.1 S/P CABG (CORONARY ARTERY BYPASS GRAFT): ICD-10-CM

## 2024-04-22 DIAGNOSIS — I25.10 CORONARY ARTERY DISEASE, UNSPECIFIED VESSEL OR LESION TYPE, UNSPECIFIED WHETHER ANGINA PRESENT, UNSPECIFIED WHETHER NATIVE OR TRANSPLANTED HEART: ICD-10-CM

## 2024-04-22 DIAGNOSIS — Z95.1 S/P CABG (CORONARY ARTERY BYPASS GRAFT): Primary | ICD-10-CM

## 2024-04-22 LAB
OHS QRS DURATION: 126 MS
OHS QTC CALCULATION: 452 MS

## 2024-04-22 PROCEDURE — 1159F MED LIST DOCD IN RCRD: CPT | Mod: CPTII,S$GLB,, | Performed by: THORACIC SURGERY (CARDIOTHORACIC VASCULAR SURGERY)

## 2024-04-22 PROCEDURE — 99024 POSTOP FOLLOW-UP VISIT: CPT | Mod: S$GLB,,, | Performed by: THORACIC SURGERY (CARDIOTHORACIC VASCULAR SURGERY)

## 2024-04-22 PROCEDURE — 71046 X-RAY EXAM CHEST 2 VIEWS: CPT | Mod: TC,FY

## 2024-04-22 PROCEDURE — 3077F SYST BP >= 140 MM HG: CPT | Mod: CPTII,S$GLB,, | Performed by: THORACIC SURGERY (CARDIOTHORACIC VASCULAR SURGERY)

## 2024-04-22 PROCEDURE — 93005 ELECTROCARDIOGRAM TRACING: CPT | Mod: S$GLB,,, | Performed by: THORACIC SURGERY (CARDIOTHORACIC VASCULAR SURGERY)

## 2024-04-22 PROCEDURE — 93010 ELECTROCARDIOGRAM REPORT: CPT | Mod: S$GLB,,, | Performed by: INTERNAL MEDICINE

## 2024-04-22 PROCEDURE — 3078F DIAST BP <80 MM HG: CPT | Mod: CPTII,S$GLB,, | Performed by: THORACIC SURGERY (CARDIOTHORACIC VASCULAR SURGERY)

## 2024-04-22 PROCEDURE — 1160F RVW MEDS BY RX/DR IN RCRD: CPT | Mod: CPTII,S$GLB,, | Performed by: THORACIC SURGERY (CARDIOTHORACIC VASCULAR SURGERY)

## 2024-04-22 PROCEDURE — 99999 PR PBB SHADOW E&M-EST. PATIENT-LVL III: CPT | Mod: PBBFAC,,, | Performed by: THORACIC SURGERY (CARDIOTHORACIC VASCULAR SURGERY)

## 2024-04-22 PROCEDURE — 71046 X-RAY EXAM CHEST 2 VIEWS: CPT | Mod: 26,,, | Performed by: RADIOLOGY

## 2024-04-22 PROCEDURE — 1126F AMNT PAIN NOTED NONE PRSNT: CPT | Mod: CPTII,S$GLB,, | Performed by: THORACIC SURGERY (CARDIOTHORACIC VASCULAR SURGERY)

## 2024-04-22 NOTE — PATIENT INSTRUCTIONS
Please make appointments to check in with your PCP and Cardiologist in the next 2 to 6 weeks.    Continue walking during cooler parts of the day or early evening to build up your endurance.     You may drive, if you have power steering.    Continue to clean your wound daily with soap and water.    Your lifting restriction is still in place until you are 12 weeks out from your surgery:     5 pounds first 6 weeks after surgery  (May 8)   20 pounds for weeks 7 - 12 after surgery (May 9 through June 19)    COVID Precautions:    Continue to take precautions against COVID. Mask up when around unknown people, wash / sanitize hands frequently. Avoid large groups of people until at least 12 weeks post op.

## 2024-04-22 NOTE — LETTER
April 22, 2024    Gerald Wilkinson  [address] [address]             [facility]  [address]  [place] [address]  Phone: [phone]                                  [facility]   [address]   [place] [address]  [phone]         [facility]   [address] [place] [address] [phone]         [facility]    [address]  [place] [address] [phone]   Re: Gerald Wilkinson  Clinic number: [id number]    Dear Mr. Wilkinson:    You were recently admitted to an [facility] facility for cardiac (heart) problem.  Your physician has referred you to [facility].  Cardiac Rehab Phase 2 is an educational and exercise program, conducted in a outpatient setting, proven to help reduce your risk for recurrent heart events.    Cardiac rehab has two major parts:    1. Exercise training to help you achieve cardiovascular fitness while learning how to exercise safely and improve muscle strength and endurance.  Your exercise prescription will be based on the results of the cardiopulmonary stress test (CPX) which will be done before entering the program and at completion.  2. Education, counseling and training to help you understand your heart condition and find ways to reduce your risk of future heart problems.  The cardiac rehab team will help you learn how to cope with the stress of adjusting to a new lifestyle and to deal with your fears about the future.    Phase 2 is a 36-session program, meeting 3 times a week for 12 weeks.  Each session consists of an hour of exercise and half-hour dedicated to the educational topic of the day.  Class days vary per location.  Please contact your nearest facility for details.    Through cardiac rehab you will learn:  About your heart condition, medical therapies, and medication  Risk factors in y our lifestyle contributing to heart  disease  New strategies to modify your risk factors  About a healthy diet that can lower your blood cholesterol, control weight, help prevent or control high blood pressure, and diabetes  How to stop smoking  How to manage stress    If you are interested in getting started, call the [facility] of your choosing.     Sincerely,     [facility]

## 2024-04-22 NOTE — PROGRESS NOTES
Phase II cardiac rehab orders submitted to [facility] .  Referral placed to Dr Lazcano.    Explained to pt he is still under a 5lb weight restrictions until  June 8th. He will be under a  20lb  lifting restriction from 5/9-6/19.    After 12 weeks he will have no further restrictions.    Pt reminded to continue washing incisions everyday with soap and water.  Pt was provided with a copy of AVS and instructed on medication changes.  Pt verbalized understanding of all instructions.    Julie Haase RN  [facility] Nurse Navigator [phone]

## 2024-04-24 ENCOUNTER — TELEPHONE (OUTPATIENT)
Dept: CARDIOTHORACIC SURGERY | Facility: CLINIC | Age: 74
End: 2024-04-24
Payer: MEDICARE

## 2024-04-24 RX ORDER — METOPROLOL TARTRATE 75 MG/1
75 TABLET, FILM COATED ORAL 2 TIMES DAILY
Qty: 60 TABLET | Refills: 11 | Status: SHIPPED | OUTPATIENT
Start: 2024-04-24 | End: 2025-04-24

## 2024-04-24 RX ORDER — ASPIRIN 81 MG/1
81 TABLET ORAL DAILY
Qty: 30 TABLET | Refills: 0 | Status: SHIPPED | OUTPATIENT
Start: 2024-04-24 | End: 2024-06-17

## 2024-04-24 RX ORDER — ATORVASTATIN CALCIUM 80 MG/1
80 TABLET, FILM COATED ORAL DAILY
Qty: 30 TABLET | Refills: 3 | Status: SHIPPED | OUTPATIENT
Start: 2024-04-24 | End: 2025-04-24

## 2024-04-24 NOTE — TELEPHONE ENCOUNTER
Called pt to let him know prescriptions have been transferred to [facility] on file.      Julie Haase RN  [facility] Nurse Navigator [phone]      ----- Message from Lorenza Lim sent at 4/24/2024 12:01 PM CDT -----  Regarding: Pt called regarding Rx  Contact: [phone]  Name of Who is Calling:KEITH MCKEON [[id number]]        What is the request in detail:Pt called states he was adv by the  That he wld call in 2 Rx for him not done yet. Please advise         Can the clinic reply by [facility]:No        What Number to Call Back if not in [facility]: Telephone Information:  Mobile          [phone]

## 2024-05-08 ENCOUNTER — TELEPHONE (OUTPATIENT)
Dept: CARDIAC REHAB | Facility: CLINIC | Age: 74
End: 2024-05-08
Payer: MEDICARE

## 2024-06-01 ENCOUNTER — OFFICE VISIT (OUTPATIENT)
Dept: URGENT CARE | Facility: CLINIC | Age: 74
End: 2024-06-01
Payer: MEDICARE

## 2024-06-01 VITALS
HEART RATE: 89 BPM | HEIGHT: 69 IN | OXYGEN SATURATION: 98 % | SYSTOLIC BLOOD PRESSURE: 177 MMHG | TEMPERATURE: 98 F | BODY MASS INDEX: 23.25 KG/M2 | DIASTOLIC BLOOD PRESSURE: 106 MMHG | WEIGHT: 157 LBS | RESPIRATION RATE: 18 BRPM

## 2024-06-01 DIAGNOSIS — S69.92XA FINGER INJURY, LEFT, INITIAL ENCOUNTER: Primary | ICD-10-CM

## 2024-06-01 PROCEDURE — 99499 UNLISTED E&M SERVICE: CPT | Mod: S$GLB,,,

## 2024-06-01 NOTE — PROGRESS NOTES
"Subjective:      Patient ID: Gerald Wilkinson is a 73 y.o. male.    Vitals:  height is 5' 9" (1.753 m) and weight is 71.2 kg (157 lb). His temperature is 98.3 °F (36.8 °C). His blood pressure is 177/106 (abnormal) and his pulse is 89. His respiration is 18 and oxygen saturation is 98%.     Chief Complaint: Hand Injury    Pt presents with complaints of a left pinky injury that occurred today. 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 No significant pain. No other complaints at this time.     Home meds include aspirin  Tdap up to date-last received 2020    Hand Injury   His dominant hand is their right hand. The incident occurred less than 1 hour ago. The incident occurred at work. Injury mechanism: cut. The pain is present in the left hand. The pain does not radiate. The pain is at a severity of 4/10. The pain is mild. The pain has been Constant since the incident. Pertinent negatives include no chest pain, muscle weakness, numbness or tingling. The symptoms are aggravated by movement. He has tried nothing for the symptoms.       Cardiovascular:  Negative for chest pain.   Skin:         +pinky finger injury   Neurological:  Negative for numbness.      Objective:     Physical Exam   Constitutional: He is oriented to person, place, and time. No distress.   HENT:   Head: Normocephalic and atraumatic.   Ears:   Right Ear: External ear normal.   Left Ear: External ear normal.   Nose: Nose normal.   Mouth/Throat: Mucous membranes are moist.   Eyes: Conjunctivae are normal.   Cardiovascular: Normal rate.   Pulmonary/Chest: Effort normal. No respiratory distress.   Abdominal: Normal appearance.   Musculoskeletal:        Hands:       Comments: +0.3-0.5cm cut on distal aspect of pinky finger(marked in blue), mild bleeding, epidermal layer only. This is not a deep cut.   Pulses intact  Distal aspect of finger amputated(baseline)-marked in " red  Full ROM   Neurological: He is alert and oriented to person, place, and time.   Skin: Skin is warm and dry. Capillary refill takes less than 2 seconds.   Psychiatric: His behavior is normal. Mood and thought content normal.   Nursing note and vitals reviewed.      Assessment:     1. Finger injury, left, initial encounter        Plan:       Finger injury, left, initial encounter  -     Laceration Repair

## 2024-06-01 NOTE — PATIENT INSTRUCTIONS
Keep finger clean  The glue placed on the finger will fall off on its own    - You must understand that you have received an Urgent Care treatment only and that you may be released before all of your medical problems are known or treated.   - You, the patient, will arrange for follow up care as instructed.   - If your condition worsens or fails to improve we recommend that you receive another evaluation at the ER immediately or contact your PCP to discuss your concerns or return here.

## 2024-06-01 NOTE — PROCEDURES
Laceration Repair    Date/Time: 6/1/2024 12:30 PM    Performed by: Elaine Al PA-C  Authorized by: Elaine Al PA-C  Location: left little finger.  Laceration length: 0.5 cm  Foreign bodies: no foreign bodies  Tendon involvement: none  Nerve involvement: none  Vascular damage: no  Anesthesia method: none needed.    Patient sedated: no  Preparation: Patient was prepped and draped in the usual sterile fashion.  Irrigation solution: saline  Irrigation method: syringe  Amount of cleaning: standard  Debridement: none  Degree of undermining: none  Skin closure: glue  Dressing: open (no dressing)  Patient tolerance: Patient tolerated the procedure well with no immediate complications

## 2024-06-03 ENCOUNTER — PATIENT MESSAGE (OUTPATIENT)
Dept: PRIMARY CARE CLINIC | Facility: CLINIC | Age: 74
End: 2024-06-03
Payer: MEDICARE

## 2024-06-03 NOTE — TELEPHONE ENCOUNTER
"Pt states he had a double bypass heart sx on 3/27. States since then he has noticed a "swollen area" to the right low abd. Pt thought this would go away as he recovered but it hasn't. Pt states the area is not painful, but uncomfortable if he palpates the area. Pt states he is unsure if he should F/U with PCP or Dr Clifton? Pt states overall, he feels great.  "

## 2024-06-17 ENCOUNTER — OFFICE VISIT (OUTPATIENT)
Dept: PRIMARY CARE CLINIC | Facility: CLINIC | Age: 74
End: 2024-06-17
Payer: MEDICARE

## 2024-06-17 ENCOUNTER — PATIENT MESSAGE (OUTPATIENT)
Dept: PRIMARY CARE CLINIC | Facility: CLINIC | Age: 74
End: 2024-06-17
Payer: MEDICARE

## 2024-06-17 VITALS
HEIGHT: 69 IN | SYSTOLIC BLOOD PRESSURE: 154 MMHG | BODY MASS INDEX: 23.51 KG/M2 | HEART RATE: 62 BPM | DIASTOLIC BLOOD PRESSURE: 76 MMHG | OXYGEN SATURATION: 96 % | WEIGHT: 158.75 LBS

## 2024-06-17 DIAGNOSIS — K40.30 NON-RECURRENT UNILATERAL INGUINAL HERNIA WITH OBSTRUCTION WITHOUT GANGRENE: ICD-10-CM

## 2024-06-17 DIAGNOSIS — K46.9 ABDOMINAL HERNIA WITHOUT OBSTRUCTION AND WITHOUT GANGRENE, RECURRENCE NOT SPECIFIED, UNSPECIFIED HERNIA TYPE: Primary | ICD-10-CM

## 2024-06-17 DIAGNOSIS — R10.31 RIGHT LOWER QUADRANT PAIN: Primary | ICD-10-CM

## 2024-06-17 PROCEDURE — 3078F DIAST BP <80 MM HG: CPT | Mod: CPTII,S$GLB,, | Performed by: INTERNAL MEDICINE

## 2024-06-17 PROCEDURE — 99999 PR PBB SHADOW E&M-EST. PATIENT-LVL III: CPT | Mod: PBBFAC,,, | Performed by: INTERNAL MEDICINE

## 2024-06-17 PROCEDURE — 1159F MED LIST DOCD IN RCRD: CPT | Mod: CPTII,S$GLB,, | Performed by: INTERNAL MEDICINE

## 2024-06-17 PROCEDURE — 3077F SYST BP >= 140 MM HG: CPT | Mod: CPTII,S$GLB,, | Performed by: INTERNAL MEDICINE

## 2024-06-17 PROCEDURE — 99214 OFFICE O/P EST MOD 30 MIN: CPT | Mod: S$GLB,,, | Performed by: INTERNAL MEDICINE

## 2024-06-17 NOTE — PROGRESS NOTES
[facility] Note    Chief Complaint      Chief Complaint   Patient presents with    Mass     Possible hernia, mass detected lower abdominal area     History of Present Illness      Gerald Wilkinson is a 73 y.o. male who presents today for chief complaint rlq pain .  HPI    S/p CABG x2in March 2024   He endorses some suprapubic numbness, he noticed post op, He is walking 2-3 mi a day, he Is playing goldf again   He also also had L hand numbness and R IT band numbness   He notices a daily lower abodominal tightness or soreness. Worse with activity. Spoke with Jakob who attributed to heparin injections during admission,   Active Problem List with Overview Notes    Diagnosis Date Noted    S/P CABG (coronary artery bypass graft) 04/22/2024    Stress hyperglycemia 03/27/2024    Left main coronary artery thrombosis 03/23/2024    Positive cardiac stress test 03/22/2024    Paroxysmal atrial fibrillation with rapid ventricular response 03/22/2024    Chest pain 03/11/2024     Atypical chest pain by report   EKG wht rbbb pvc's . No old EKG to compare         Chronic right shoulder pain 03/30/2023      refered to pt for eval and treat is doing much better       Borderline hyperlipidemia 03/10/2021     Stable on diet only needs repeat labs next visit       Prostate cancer screening 03/10/2021     Check psa       Encounter for hepatitis C screening test for low risk patient 03/10/2021     Hep c screen       Loose stools 09/10/2020     Is now on citrucel which seems to be helping better than  Metamucil . Cont current regimen       Erectile dysfunction 08/18/2020     Cont cialis is working well       Benign essential hypertension 09/05/2019     bp well controlled cont current       Gastroesophageal reflux disease without esophagitis 09/05/2019     resolved       Primary insomnia 09/05/2019     ambien prn continues to work well        Health Maintenance   Topic Date Due    TETANUS VACCINE  Never done    Shingles  Vaccine (1 of 2) Never done    Colorectal Cancer Screening  01/30/2024    High Dose Statin  04/24/2025    Lipid Panel  04/25/2028    Hepatitis C Screening  Completed       History reviewed. No pertinent past medical history.    Past Surgical History:   Procedure Laterality Date    CORONARY ANGIOGRAPHY N/A 3/22/2024    Procedure: ANGIOGRAM, CORONARY ARTERY;  Surgeon: Yumi Bal MD;  Location: [facility] CATH LAB;  Service: Cardiology;  Laterality: N/A;    CORONARY ARTERY BYPASS GRAFT (CABG) N/A 3/27/2024    Procedure: CORONARY ARTERY BYPASS GRAFT (CABG);  Surgeon: Tony Canas MD;  Location: [facility] OR [address] [place] [address];  Service: Cardiovascular;  Laterality: N/A;  left atrial appendage resection    ENDOSCOPIC HARVEST OF VEIN Left 3/27/2024    Procedure: HARVEST-VEIN-ENDOVASCULAR;  Surgeon: Tony Canas MD;  [facility];  Service: Cardiovascular;  Laterality: Left;  VEIN HARVEST START 0917  VEIN OUT AND PREP START 0945  VEIN READY 1009    LEFT HEART CATHETERIZATION Left 3/22/2024    Procedure: Left heart cath;  Surgeon: Yumi Bal MD;  Location: [facility] CATH LAB;  Service: Cardiology;  Laterality: Left;       family history includes Cancer in his mother and sister; Hypertension in his mother and sister.     Social History     Tobacco Use    Smoking status: Never    Smokeless tobacco: Never   Substance Use Topics    Alcohol use: No    Drug use: Yes       Review of Systems   Constitutional:  Negative for chills, fever, malaise/fatigue and weight loss.   Respiratory:  Negative for cough, sputum production, shortness of breath and wheezing.    Cardiovascular:  Negative for chest pain, palpitations, orthopnea and leg swelling.   Gastrointestinal:  Positive for abdominal pain. Negative for constipation, diarrhea, nausea and vomiting.   Genitourinary:  Negative for dysuria, frequency, hematuria and urgency.        Outpatient Encounter Medications as of 6/17/2024   Medication Sig Dispense Refill    aspirin (ECOTRIN) 81  "MG EC tablet Take 1 tablet (81 mg total) by mouth once daily. 30 tablet 0    atorvastatin (LIPITOR) 80 MG tablet Take 1 tablet (80 mg total) by mouth once daily. 30 tablet 3    metoprolol tartrate 75 mg Tab Take 1 tablet (75 mg total) by mouth 2 (two) times daily. 60 tablet 11     No facility-administered encounter medications on file as of 6/17/2024.       Review of patient's allergies indicates:  No Known Allergies      Physical Exam      Vital Signs  Pulse: 62  SpO2: 96 %  BP: (!) 154/76  BP Location: Left arm  Patient Position: Sitting  Height and Weight  Height: 5' 9" (175.3 cm)  Weight: 72 kg (158 lb 11.7 oz)  BSA (Calculated - sq m): 1.87 sq meters  BMI (Calculated): 23.4  Weight in (lb) to have BMI = 25: 168.9]    Physical Exam  Vitals reviewed.   Constitutional:       Appearance: He is well-developed. He is not diaphoretic.   HENT:      Head: Normocephalic and atraumatic.      Right Ear: External ear normal.      Left Ear: External ear normal.   Eyes:      General: No scleral icterus.        Right eye: No discharge.         Left eye: No discharge.      Conjunctiva/sclera: Conjunctivae normal.   Cardiovascular:      Rate and Rhythm: Normal rate and regular rhythm.      Heart sounds: Normal heart sounds.   Pulmonary:      Effort: Pulmonary effort is normal. No respiratory distress.      Breath sounds: Normal breath sounds.   Abdominal:      Hernia: A hernia (r inguinal bulge with valsalva) is present.   Musculoskeletal:         General: Normal range of motion.      Cervical back: Normal range of motion.   Neurological:      Mental Status: He is alert and oriented to person, place, and time.   Psychiatric:         Behavior: Behavior normal.         Thought Content: Thought content normal.         Judgment: Judgment normal.          Laboratory:  CBC:  Recent Labs   Lab Result Units 03/29/24  0310 03/30/24  0610 03/31/24  0607   WBC K/uL 11.67 11.12 8.53   RBC M/uL 2.89* 2.97* 3.07*   Hemoglobin g/dL 8.7* 8.9* " "9.2*   Hematocrit % 26.2* 27.2* 28.3*   Platelets K/uL 138* 148* 194   MCV fL 91 92 92   MCH pg 30.1 30.0 30.0   MCHC g/dL 33.2 32.7 32.5     CMP:  Recent Labs   Lab Result Units 03/30/24  0012 03/30/24  0610 03/31/24  0607   Glucose mg/dL 134* 103 95   Calcium mg/dL 8.7 8.6* 8.6*   Albumin g/dL 3.3* 3.5 3.4*   Total Protein g/dL 5.9* 5.7* 5.8*   Sodium mmol/L 139 140 141   Potassium mmol/L 4.1 4.1 3.6   CO2 mmol/L 26 25 25   Chloride mmol/L 107 105 106   BUN mg/dL 33* 32* 31*   Alkaline Phosphatase U/L 33* 37* 40*   ALT U/L 19 20 21   AST U/L 25 24 22   Total Bilirubin mg/dL 0.5 0.6 0.7     URINALYSIS:  Recent Labs   Lab Result Units 03/22/24  1109   Color, UA  Colorless*   Specific Gravity, UA  1.010   pH, UA  7.0   Protein, UA  Negative   Nitrite, UA  Negative   Leukocytes, UA  Negative      LIPIDS:  No results for input(s): "TSH", "HDL", "CHOL", "TRIG", "LDLCALC", "CHOLHDL", "NONHDLCHOL", "TOTALCHOLEST" in the last 2160 hours.  TSH:  No results for input(s): "TSH" in the last 2160 hours.  A1C:  No results for input(s): "HGBA1C" in the last 2160 hours.    Radiology:      Assessment/Plan     Gerald Wilkinson is a 73 y.o.male with:    1. Right lower quadrant pain  -     Ambulatory referral/consult to General Surgery; Future; Expected date: 06/24/2024  -     CT Abdomen Pelvis  Without Contrast; Future; Expected date: 06/17/2024    2. Non-recurrent unilateral inguinal hernia with obstruction without gangrene  -     Ambulatory referral/consult to General Surgery; Future; Expected date: 06/24/2024      Discussed up to cardiology to agree to interrupt anti platelet for surgery, unlikely sooner than 6 months   Use of the [employer] Patient Portal discussed and encouraged during today's visit  -Continue current medications and maintain follow up with specialist  Future Appointments   Date Time Provider Department Center   6/20/2024  1:45 PM OC  CT1 OC CTSCAN [place]   6/26/2024 10:45 AM Andrei Marquez MD [facility] " [place]   6/27/2024 10:00 AM Neeraj Lazcano MD [facility]  6/17/2024 3:04 PM    [facility] Internal Medicine

## 2024-06-17 NOTE — TELEPHONE ENCOUNTER
Pt is suggesting he has a hernia in his RLQ. States there is a small bulge causing him pain. Not sure if he should be evaluted for this, I have placed a CT order for review

## 2024-06-20 ENCOUNTER — HOSPITAL ENCOUNTER (OUTPATIENT)
Dept: RADIOLOGY | Facility: HOSPITAL | Age: 74
Discharge: HOME | End: 2024-06-20
Attending: INTERNAL MEDICINE
Payer: MEDICARE

## 2024-06-20 DIAGNOSIS — R10.31 RIGHT LOWER QUADRANT PAIN: ICD-10-CM

## 2024-06-20 PROCEDURE — 74176 CT ABD & PELVIS W/O CONTRAST: CPT | Mod: TC

## 2024-06-20 PROCEDURE — 74176 CT ABD & PELVIS W/O CONTRAST: CPT | Mod: 26,,, | Performed by: RADIOLOGY

## 2024-06-26 ENCOUNTER — OFFICE VISIT (OUTPATIENT)
Dept: SURGERY | Facility: CLINIC | Age: 74
End: 2024-06-26
Payer: MEDICARE

## 2024-06-26 ENCOUNTER — HOSPITAL ENCOUNTER (OUTPATIENT)
Dept: RADIOLOGY | Facility: HOSPITAL | Age: 74
Discharge: HOME | End: 2024-06-26
Attending: STUDENT IN AN ORGANIZED HEALTH CARE EDUCATION/TRAINING PROGRAM
Payer: MEDICARE

## 2024-06-26 VITALS
OXYGEN SATURATION: 100 % | HEIGHT: 69 IN | DIASTOLIC BLOOD PRESSURE: 79 MMHG | WEIGHT: 157.19 LBS | BODY MASS INDEX: 23.28 KG/M2 | SYSTOLIC BLOOD PRESSURE: 170 MMHG | HEART RATE: 70 BPM

## 2024-06-26 DIAGNOSIS — R10.31 RIGHT LOWER QUADRANT PAIN: ICD-10-CM

## 2024-06-26 DIAGNOSIS — K40.30 NON-RECURRENT UNILATERAL INGUINAL HERNIA WITH OBSTRUCTION WITHOUT GANGRENE: ICD-10-CM

## 2024-06-26 DIAGNOSIS — K40.30 NON-RECURRENT UNILATERAL INGUINAL HERNIA WITH OBSTRUCTION WITHOUT GANGRENE: Primary | ICD-10-CM

## 2024-06-26 PROCEDURE — 76705 ECHO EXAM OF ABDOMEN: CPT | Mod: TC

## 2024-06-26 PROCEDURE — 99999 PR PBB SHADOW E&M-EST. PATIENT-LVL III: CPT | Mod: PBBFAC,,, | Performed by: STUDENT IN AN ORGANIZED HEALTH CARE EDUCATION/TRAINING PROGRAM

## 2024-06-26 PROCEDURE — 99202 OFFICE O/P NEW SF 15 MIN: CPT | Mod: S$GLB,,, | Performed by: STUDENT IN AN ORGANIZED HEALTH CARE EDUCATION/TRAINING PROGRAM

## 2024-06-26 PROCEDURE — 3077F SYST BP >= 140 MM HG: CPT | Mod: CPTII,S$GLB,, | Performed by: STUDENT IN AN ORGANIZED HEALTH CARE EDUCATION/TRAINING PROGRAM

## 2024-06-26 PROCEDURE — 1126F AMNT PAIN NOTED NONE PRSNT: CPT | Mod: CPTII,S$GLB,, | Performed by: STUDENT IN AN ORGANIZED HEALTH CARE EDUCATION/TRAINING PROGRAM

## 2024-06-26 PROCEDURE — 76705 ECHO EXAM OF ABDOMEN: CPT | Mod: 26,,, | Performed by: RADIOLOGY

## 2024-06-26 PROCEDURE — 3288F FALL RISK ASSESSMENT DOCD: CPT | Mod: CPTII,S$GLB,, | Performed by: STUDENT IN AN ORGANIZED HEALTH CARE EDUCATION/TRAINING PROGRAM

## 2024-06-26 PROCEDURE — 1101F PT FALLS ASSESS-DOCD LE1/YR: CPT | Mod: CPTII,S$GLB,, | Performed by: STUDENT IN AN ORGANIZED HEALTH CARE EDUCATION/TRAINING PROGRAM

## 2024-06-26 PROCEDURE — 1159F MED LIST DOCD IN RCRD: CPT | Mod: CPTII,S$GLB,, | Performed by: STUDENT IN AN ORGANIZED HEALTH CARE EDUCATION/TRAINING PROGRAM

## 2024-06-26 PROCEDURE — 3078F DIAST BP <80 MM HG: CPT | Mod: CPTII,S$GLB,, | Performed by: STUDENT IN AN ORGANIZED HEALTH CARE EDUCATION/TRAINING PROGRAM

## 2024-06-26 NOTE — PROGRESS NOTES
General Surgery Office Visit   History and Physical    Patient Name: Gerald Wilkinson  YOB: 1950 (73 y.o.)  MRN: [id number]  Today's Date: 06/26/2024    Referring Md:   Nora Lopez Md  [address] [facility]  [address]  [place] [id number]    SUBJECTIVE:     Chief Complaint: R groin swelling     History of Present Illness:  Gerald Wilkinson is a 73 y.o. male with PMHx of CABG (March 2024), HTN, HLD, A fib (not on AC), GERD, insomnia who presents to the clinic today complaining of R groin swelling and pain which he first noticed a few months ago after his CABG. He reports feeling swelling and a mild pain to his R groin that is worse with coughing and at the end of the day. Attempted heat pads and OTC topical creams with minimal changes. No other known aggravating or alleviating factors. He denies fever, chills, redness, firmness, n/v/d, constipation, hematochezia, dysuria, hematuria, CP, SOB, and all other symptoms. Patient reports being compliant with home medication regimen.     CT a/p last week with asymptomatic umbilical hernia. No inguinal hernia noted.      He reports being very active his CABG. Walks daily and plays golf often.     Patient denies personal history of MI, CVA, lung disease, DM  Previous abdominal surgeries include: none  Not currently on any anticoagulants      Review of patient's allergies indicates:  No Known Allergies    No past medical history on file.  Past Surgical History:   Procedure Laterality Date    CORONARY ANGIOGRAPHY N/A 3/22/2024    Procedure: ANGIOGRAM, CORONARY ARTERY;  Surgeon: Yumi Bal MD;  Location: [facility] CATH LAB;  Service: Cardiology;  Laterality: N/A;    CORONARY ARTERY BYPASS GRAFT (CABG) N/A 3/27/2024    Procedure: CORONARY ARTERY BYPASS GRAFT (CABG);  Surgeon: Tony Canas MD;  Location: [facility] OR [address] [place] [address];  Service: Cardiovascular;  Laterality: N/A;  left atrial appendage resection    ENDOSCOPIC HARVEST OF VEIN Left 3/27/2024    Procedure:  "HARVEST-VEIN-ENDOVASCULAR;  Surgeon: Tony Canas MD;  Location: [facility] OR [address] [place] [address];  Service: Cardiovascular;  Laterality: Left;  VEIN HARVEST START 0917  VEIN OUT AND PREP START 0945  VEIN READY 1009    LEFT HEART CATHETERIZATION Left 3/22/2024    Procedure: Left heart cath;  Surgeon: Yumi Bal MD;  Location: [facility] CATH LAB;  Service: Cardiology;  Laterality: Left;     Family History   Problem Relation Name Age of Onset    Hypertension Mother      Cancer Mother      Hypertension Sister      Cancer Sister       Social History     Tobacco Use    Smoking status: Never    Smokeless tobacco: Never   Substance Use Topics    Alcohol use: No    Drug use: Yes        Review of Systems:  Review of Systems   Constitutional:  Negative for chills and fever.   Respiratory:  Negative for cough and shortness of breath.    Cardiovascular:  Negative for chest pain.   Gastrointestinal:  Negative for abdominal pain, diarrhea, nausea and vomiting.        (+) R groin swelling, pain   Genitourinary:  Negative for dysuria and hematuria.   Musculoskeletal:  Negative for falls.   Skin:  Negative for rash.   Neurological:  Negative for dizziness and headaches.   Psychiatric/Behavioral:  Negative for substance abuse. The patient is not nervous/anxious.    All other systems reviewed and are negative.      OBJECTIVE:     Vital Signs (Most Recent)  BP (!) 170/79 (BP Location: Left arm, Patient Position: Sitting, BP Method: Medium (Automatic))   Pulse 70   Ht 5' 9" (1.753 m)   Wt 71.3 kg (157 lb 3 oz)   SpO2 100%   BMI 23.21 kg/m²     Physical Exam  Vitals and nursing note reviewed.   Constitutional:       General: He is not in acute distress.     Appearance: He is not ill-appearing or diaphoretic.      Comments: Room air   HENT:      Head: Normocephalic and atraumatic.      Mouth/Throat:      Mouth: Mucous membranes are moist.      Pharynx: Oropharynx is clear.   Eyes:      Extraocular Movements: Extraocular movements intact.      " Conjunctiva/sclera: Conjunctivae normal.   Cardiovascular:      Rate and Rhythm: Normal rate.   Pulmonary:      Effort: Pulmonary effort is normal. No respiratory distress.   Abdominal:      General: There is no distension.      Palpations: Abdomen is soft.      Tenderness: There is no abdominal tenderness. There is no guarding or rebound.      Comments: Well healed surgical scar noted  Soft, reducible umbilical hernia noted. No erythema or overlying skin changes.   No inguinal hernias appreciated.    Musculoskeletal:         General: No deformity.   Skin:     General: Skin is warm and dry.      Coloration: Skin is not jaundiced.   Neurological:      Mental Status: He is alert and oriented to person, place, and time.           Labs:   Lab Results   Component Value Date    WBC 8.53 03/31/2024    HGB 9.2 (L) 03/31/2024    HCT 28.3 (L) 03/31/2024    MCV 92 03/31/2024     03/31/2024       CMP  Sodium   Date Value Ref Range Status   03/31/2024 141 136 - 145 mmol/L Final     Potassium   Date Value Ref Range Status   03/31/2024 3.6 3.5 - 5.1 mmol/L Final     Chloride   Date Value Ref Range Status   03/31/2024 106 95 - 110 mmol/L Final     CO2   Date Value Ref Range Status   03/31/2024 25 23 - 29 mmol/L Final     Glucose   Date Value Ref Range Status   03/31/2024 95 70 - 110 mg/dL Final     BUN   Date Value Ref Range Status   03/31/2024 31 (H) 8 - 23 mg/dL Final     Creatinine   Date Value Ref Range Status   03/31/2024 0.9 0.5 - 1.4 mg/dL Final     Calcium   Date Value Ref Range Status   03/31/2024 8.6 (L) 8.7 - 10.5 mg/dL Final     Total Protein   Date Value Ref Range Status   03/31/2024 5.8 (L) 6.0 - 8.4 g/dL Final     Albumin   Date Value Ref Range Status   03/31/2024 3.4 (L) 3.5 - 5.2 g/dL Final     Total Bilirubin   Date Value Ref Range Status   03/31/2024 0.7 0.1 - 1.0 mg/dL Final     Comment:     For infants and newborns, interpretation of results should be based  on gestational age, weight and in agreement  with clinical  observations.    Premature Infant recommended reference ranges:  Up to 24 hours.............<8.0 mg/dL  Up to 48 hours............<12.0 mg/dL  3-5 days..................<15.0 mg/dL  6-29 days.................<15.0 mg/dL       Alkaline Phosphatase   Date Value Ref Range Status   03/31/2024 40 (L) 55 - 135 U/L Final     AST   Date Value Ref Range Status   03/31/2024 22 10 - 40 U/L Final     ALT   Date Value Ref Range Status   03/31/2024 21 10 - 44 U/L Final     Anion Gap   Date Value Ref Range Status   03/31/2024 10 8 - 16 mmol/L Final     eGFR   Date Value Ref Range Status   03/31/2024 >60.0 >60 mL/min/1.73 m^2 Final       Imaging:   CT Abdomen Pelvis  Without Contrast  Order: [id number]  Status: Final result       Visible to patient: Yes (seen)       Next appt: 06/27/2024 at 10:00 AM in Cardiology (Neeraj Lazcano MD)       Dx: Right lower quadrant pain    0 Result Notes       1 Patient Communication  Details    Reading Physician Reading Date Result Priority   Gerald Qureshi Jr., MD  [phone] 6/21/2024 Routine   Chas Reis MD  [phone]  [phone] 6/21/2024      Narrative & Impression  EXAMINATION:  CT ABDOMEN PELVIS WITHOUT CONTRAST     CLINICAL HISTORY:  RLQ abdominal pain (Age >= 14y); Right lower quadrant pain     TECHNIQUE:  Low dose axial images, sagittal and coronal reformations were obtained from the lung bases to the pubic symphysis without IV or oral contrast.     COMPARISON:  CT abdomen pelvis 10/02/2020     FINDINGS:  Lower thorax:     Heart: Normal in size. No pericardial effusion.     Lung Bases: Well aerated, without consolidation or pleural fluid.     Liver: Normal in size and attenuation, with no focal hepatic lesions.     Gallbladder: No calcified gallstones.  Gallbladder is not distended.  No gallbladder wall thickening or pericholecystic fluid collection.     Bile Ducts: No intra or extrahepatic biliary duct dilatation.     Pancreas: No mass or peripancreatic  fat stranding.     Spleen: Normal in size.  No focal lesion.     Adrenals: Stable right adrenal nodular thickening.  Left adrenal gland is unremarkable.     Kidneys/ Ureters: Normal in size and location.  No hydronephrosis or nephrolithiasis. No ureteral dilatation.     Bladder: No evidence of wall thickening.     Reproductive organs: Prostatomegaly measure 5.2 cm. Dystrophic calcifications.  Incompletely visualized right hydrocele.     Gl/Mesentery/peritoneum and retroperitoneum: Distal esophagus and stomach are unremarkable.  Small bowel is normal in caliber with no evidence of obstruction. No evidence of inflammation or wall thickening.  The appendix is more prominent than prior measuring 0.7 cm, previously measured 0.4 cm however appear normal with no adjacent inflammation.  Focal area of fat stranding in the right lower quadrant (3:133).  Stable mesentery fat stranding and prominent mesenteric node in the left upper quadrant (3:72).  Colonic diverticuli most prominent the sigmoid colon without diverticulitis.  No ascites.  No free air.No significant adenopathy.     Abdominal wall: Small fat containing umbilical hernia.     Vasculature: Moderate atherosclerosis.  No aneurysm.     Bones: No acute fracture.  No suspicious lytic or sclerotic lesion.  Spondylolysis L5 with grade 1 anterolisthesis L5 on S1.     Impression:     1. New focal area of mesenteric fat stranding in the right lower quadrant, nonspecific.  If right lower quadrant pain persists CT enterography may be helpful.  2. Appendix is prominent though not confidently inflamed.  3. Mesenteric soft tissue stranding and scattered nonenlarged nodes are stable, nonspecific.  4. Fat containing umbilical hernia.  5. Prostatomegaly.  6. Additional findings as above.         ASSESSMENT/PLAN:     Gerald Wilkinson is a 73 y.o. male with PMHx of CABG (March 2024), HTN, HLD, A fib (not on AC), GERD, insomnia who presents to the clinic today complaining of R groin  swelling and pain, concern for inguinal hernia. Clinically stable without signs of incarceration/strangulation/obstruction    Gerald was seen today for consult.    Diagnoses and all orders for this visit:    Right lower quadrant pain  -     Ambulatory referral/consult to General Surgery    Non-recurrent unilateral inguinal hernia with obstruction without gangrene  -     Ambulatory referral/consult to General Surgery    - Provocative US   - ED precautions given  - RTC in 3 months for re-evaluation of operative repair   - Continue any current medications  - Call with any questions or concerns  - Discussed POC with patient. All questions were answered. Patient is agreeable to plan and verbalized understanding.     Case discussed with Dr. Marquez. Patient seen and examined by Dr. Marquez.      Jeferson Moya, MPAS, PA-C  General Surgery  - [facility]

## 2024-06-26 NOTE — H&P (VIEW-ONLY)
General Surgery Office Visit   History and Physical    Patient Name: Gerald Wilkinson  YOB: 1950 (73 y.o.)  MRN: [id number]  Today's Date: 06/26/2024    Referring Md:   Nora Lopez Md  [address] [facility]  [address]  [place] [id number]    SUBJECTIVE:     Chief Complaint: R groin swelling     History of Present Illness:  Gerald Wilkinson is a 73 y.o. male with PMHx of CABG (March 2024), HTN, HLD, A fib (not on AC), GERD, insomnia who presents to the clinic today complaining of R groin swelling and pain which he first noticed a few months ago after his CABG. He reports feeling swelling and a mild pain to his R groin that is worse with coughing and at the end of the day. Attempted heat pads and OTC topical creams with minimal changes. No other known aggravating or alleviating factors. He denies fever, chills, redness, firmness, n/v/d, constipation, hematochezia, dysuria, hematuria, CP, SOB, and all other symptoms. Patient reports being compliant with home medication regimen.     CT a/p last week with asymptomatic umbilical hernia. No inguinal hernia noted.      He reports being very active his CABG. Walks daily and plays golf often.     Patient denies personal history of MI, CVA, lung disease, DM  Previous abdominal surgeries include: none  Not currently on any anticoagulants      Review of patient's allergies indicates:  No Known Allergies    No past medical history on file.  Past Surgical History:   Procedure Laterality Date    CORONARY ANGIOGRAPHY N/A 3/22/2024    Procedure: ANGIOGRAM, CORONARY ARTERY;  Surgeon: Yumi Bal MD;  Location: [facility] CATH LAB;  Service: Cardiology;  Laterality: N/A;    CORONARY ARTERY BYPASS GRAFT (CABG) N/A 3/27/2024    Procedure: CORONARY ARTERY BYPASS GRAFT (CABG);  Surgeon: Tony Canas MD;  Location: [facility] OR [address] [place] [address];  Service: Cardiovascular;  Laterality: N/A;  left atrial appendage resection    ENDOSCOPIC HARVEST OF VEIN Left 3/27/2024    Procedure:  "HARVEST-VEIN-ENDOVASCULAR;  Surgeon: Tony Canas MD;  Location: [facility] OR [address] [place] [address];  Service: Cardiovascular;  Laterality: Left;  VEIN HARVEST START 0917  VEIN OUT AND PREP START 0945  VEIN READY 1009    LEFT HEART CATHETERIZATION Left 3/22/2024    Procedure: Left heart cath;  Surgeon: Yumi Bal MD;  Location: [facility] CATH LAB;  Service: Cardiology;  Laterality: Left;     Family History   Problem Relation Name Age of Onset    Hypertension Mother      Cancer Mother      Hypertension Sister      Cancer Sister       Social History     Tobacco Use    Smoking status: Never    Smokeless tobacco: Never   Substance Use Topics    Alcohol use: No    Drug use: Yes        Review of Systems:  Review of Systems   Constitutional:  Negative for chills and fever.   Respiratory:  Negative for cough and shortness of breath.    Cardiovascular:  Negative for chest pain.   Gastrointestinal:  Negative for abdominal pain, diarrhea, nausea and vomiting.        (+) R groin swelling, pain   Genitourinary:  Negative for dysuria and hematuria.   Musculoskeletal:  Negative for falls.   Skin:  Negative for rash.   Neurological:  Negative for dizziness and headaches.   Psychiatric/Behavioral:  Negative for substance abuse. The patient is not nervous/anxious.    All other systems reviewed and are negative.      OBJECTIVE:     Vital Signs (Most Recent)  BP (!) 170/79 (BP Location: Left arm, Patient Position: Sitting, BP Method: Medium (Automatic))   Pulse 70   Ht 5' 9" (1.753 m)   Wt 71.3 kg (157 lb 3 oz)   SpO2 100%   BMI 23.21 kg/m²     Physical Exam  Vitals and nursing note reviewed.   Constitutional:       General: He is not in acute distress.     Appearance: He is not ill-appearing or diaphoretic.      Comments: Room air   HENT:      Head: Normocephalic and atraumatic.      Mouth/Throat:      Mouth: Mucous membranes are moist.      Pharynx: Oropharynx is clear.   Eyes:      Extraocular Movements: Extraocular movements intact.      " Conjunctiva/sclera: Conjunctivae normal.   Cardiovascular:      Rate and Rhythm: Normal rate.   Pulmonary:      Effort: Pulmonary effort is normal. No respiratory distress.   Abdominal:      General: There is no distension.      Palpations: Abdomen is soft.      Tenderness: There is no abdominal tenderness. There is no guarding or rebound.      Comments: Well healed surgical scar noted  Soft, reducible umbilical hernia noted. No erythema or overlying skin changes.   No inguinal hernias appreciated.    Musculoskeletal:         General: No deformity.   Skin:     General: Skin is warm and dry.      Coloration: Skin is not jaundiced.   Neurological:      Mental Status: He is alert and oriented to person, place, and time.           Labs:   Lab Results   Component Value Date    WBC 8.53 03/31/2024    HGB 9.2 (L) 03/31/2024    HCT 28.3 (L) 03/31/2024    MCV 92 03/31/2024     03/31/2024       CMP  Sodium   Date Value Ref Range Status   03/31/2024 141 136 - 145 mmol/L Final     Potassium   Date Value Ref Range Status   03/31/2024 3.6 3.5 - 5.1 mmol/L Final     Chloride   Date Value Ref Range Status   03/31/2024 106 95 - 110 mmol/L Final     CO2   Date Value Ref Range Status   03/31/2024 25 23 - 29 mmol/L Final     Glucose   Date Value Ref Range Status   03/31/2024 95 70 - 110 mg/dL Final     BUN   Date Value Ref Range Status   03/31/2024 31 (H) 8 - 23 mg/dL Final     Creatinine   Date Value Ref Range Status   03/31/2024 0.9 0.5 - 1.4 mg/dL Final     Calcium   Date Value Ref Range Status   03/31/2024 8.6 (L) 8.7 - 10.5 mg/dL Final     Total Protein   Date Value Ref Range Status   03/31/2024 5.8 (L) 6.0 - 8.4 g/dL Final     Albumin   Date Value Ref Range Status   03/31/2024 3.4 (L) 3.5 - 5.2 g/dL Final     Total Bilirubin   Date Value Ref Range Status   03/31/2024 0.7 0.1 - 1.0 mg/dL Final     Comment:     For infants and newborns, interpretation of results should be based  on gestational age, weight and in agreement  with clinical  observations.    Premature Infant recommended reference ranges:  Up to 24 hours.............<8.0 mg/dL  Up to 48 hours............<12.0 mg/dL  3-5 days..................<15.0 mg/dL  6-29 days.................<15.0 mg/dL       Alkaline Phosphatase   Date Value Ref Range Status   03/31/2024 40 (L) 55 - 135 U/L Final     AST   Date Value Ref Range Status   03/31/2024 22 10 - 40 U/L Final     ALT   Date Value Ref Range Status   03/31/2024 21 10 - 44 U/L Final     Anion Gap   Date Value Ref Range Status   03/31/2024 10 8 - 16 mmol/L Final     eGFR   Date Value Ref Range Status   03/31/2024 >60.0 >60 mL/min/1.73 m^2 Final       Imaging:   CT Abdomen Pelvis  Without Contrast  Order: [id number]  Status: Final result       Visible to patient: Yes (seen)       Next appt: 06/27/2024 at 10:00 AM in Cardiology (Neeraj Lazcano MD)       Dx: Right lower quadrant pain    0 Result Notes       1 Patient Communication  Details    Reading Physician Reading Date Result Priority   Gerald Qureshi Jr., MD  [phone] 6/21/2024 Routine   Chas Reis MD  [phone]  [phone] 6/21/2024      Narrative & Impression  EXAMINATION:  CT ABDOMEN PELVIS WITHOUT CONTRAST     CLINICAL HISTORY:  RLQ abdominal pain (Age >= 14y); Right lower quadrant pain     TECHNIQUE:  Low dose axial images, sagittal and coronal reformations were obtained from the lung bases to the pubic symphysis without IV or oral contrast.     COMPARISON:  CT abdomen pelvis 10/02/2020     FINDINGS:  Lower thorax:     Heart: Normal in size. No pericardial effusion.     Lung Bases: Well aerated, without consolidation or pleural fluid.     Liver: Normal in size and attenuation, with no focal hepatic lesions.     Gallbladder: No calcified gallstones.  Gallbladder is not distended.  No gallbladder wall thickening or pericholecystic fluid collection.     Bile Ducts: No intra or extrahepatic biliary duct dilatation.     Pancreas: No mass or peripancreatic  fat stranding.     Spleen: Normal in size.  No focal lesion.     Adrenals: Stable right adrenal nodular thickening.  Left adrenal gland is unremarkable.     Kidneys/ Ureters: Normal in size and location.  No hydronephrosis or nephrolithiasis. No ureteral dilatation.     Bladder: No evidence of wall thickening.     Reproductive organs: Prostatomegaly measure 5.2 cm. Dystrophic calcifications.  Incompletely visualized right hydrocele.     Gl/Mesentery/peritoneum and retroperitoneum: Distal esophagus and stomach are unremarkable.  Small bowel is normal in caliber with no evidence of obstruction. No evidence of inflammation or wall thickening.  The appendix is more prominent than prior measuring 0.7 cm, previously measured 0.4 cm however appear normal with no adjacent inflammation.  Focal area of fat stranding in the right lower quadrant (3:133).  Stable mesentery fat stranding and prominent mesenteric node in the left upper quadrant (3:72).  Colonic diverticuli most prominent the sigmoid colon without diverticulitis.  No ascites.  No free air.No significant adenopathy.     Abdominal wall: Small fat containing umbilical hernia.     Vasculature: Moderate atherosclerosis.  No aneurysm.     Bones: No acute fracture.  No suspicious lytic or sclerotic lesion.  Spondylolysis L5 with grade 1 anterolisthesis L5 on S1.     Impression:     1. New focal area of mesenteric fat stranding in the right lower quadrant, nonspecific.  If right lower quadrant pain persists CT enterography may be helpful.  2. Appendix is prominent though not confidently inflamed.  3. Mesenteric soft tissue stranding and scattered nonenlarged nodes are stable, nonspecific.  4. Fat containing umbilical hernia.  5. Prostatomegaly.  6. Additional findings as above.         ASSESSMENT/PLAN:     Gerald Wilkinson is a 73 y.o. male with PMHx of CABG (March 2024), HTN, HLD, A fib (not on AC), GERD, insomnia who presents to the clinic today complaining of R groin  swelling and pain, concern for inguinal hernia. Clinically stable without signs of incarceration/strangulation/obstruction    Gerald was seen today for consult.    Diagnoses and all orders for this visit:    Right lower quadrant pain  -     Ambulatory referral/consult to General Surgery    Non-recurrent unilateral inguinal hernia with obstruction without gangrene  -     Ambulatory referral/consult to General Surgery    - Provocative US   - ED precautions given  - RTC in 3 months for re-evaluation of operative repair   - Continue any current medications  - Call with any questions or concerns  - Discussed POC with patient. All questions were answered. Patient is agreeable to plan and verbalized understanding.     Case discussed with Dr. Marquze. Patient seen and examined by Dr. Marquez.      Jeferson Moya, MPAS, PA-C  General Surgery  - [facility]

## 2024-06-27 ENCOUNTER — OFFICE VISIT (OUTPATIENT)
Dept: CARDIOLOGY | Facility: CLINIC | Age: 74
End: 2024-06-27
Payer: MEDICARE

## 2024-06-27 VITALS
HEIGHT: 69 IN | DIASTOLIC BLOOD PRESSURE: 87 MMHG | SYSTOLIC BLOOD PRESSURE: 177 MMHG | WEIGHT: 158.31 LBS | HEART RATE: 57 BPM | BODY MASS INDEX: 23.45 KG/M2

## 2024-06-27 DIAGNOSIS — I10 BENIGN ESSENTIAL HYPERTENSION: ICD-10-CM

## 2024-06-27 DIAGNOSIS — I25.10 CORONARY ARTERY DISEASE INVOLVING NATIVE CORONARY ARTERY OF NATIVE HEART WITHOUT ANGINA PECTORIS: Primary | ICD-10-CM

## 2024-06-27 DIAGNOSIS — I25.10 CORONARY ARTERY DISEASE, UNSPECIFIED VESSEL OR LESION TYPE, UNSPECIFIED WHETHER ANGINA PRESENT, UNSPECIFIED WHETHER NATIVE OR TRANSPLANTED HEART: ICD-10-CM

## 2024-06-27 DIAGNOSIS — E78.5 BORDERLINE HYPERLIPIDEMIA: ICD-10-CM

## 2024-06-27 DIAGNOSIS — Z95.1 S/P CABG (CORONARY ARTERY BYPASS GRAFT): ICD-10-CM

## 2024-06-27 DIAGNOSIS — Z98.890 S/P LEFT ATRIAL APPENDAGE LIGATION: ICD-10-CM

## 2024-06-27 PROCEDURE — 99999 PR PBB SHADOW E&M-EST. PATIENT-LVL III: CPT | Mod: PBBFAC,,, | Performed by: INTERNAL MEDICINE

## 2024-06-27 RX ORDER — VALSARTAN 80 MG/1
80 TABLET ORAL DAILY
Qty: 90 TABLET | Refills: 3 | Status: SHIPPED | OUTPATIENT
Start: 2024-06-27 | End: 2025-06-27

## 2024-06-27 NOTE — PROGRESS NOTES
Subjective:   Patient ID:  Gerald Wilkinson is a 73 y.o. male who presents for follow up of Coronary Artery Disease (S/P CABG )      HPI: Very pleasant man who was having exertional chest discomfort in the first few months of this year, had a markedly abnormal treadmill, was found the same day to have severe LM disease, and had a 2 vessel CABG in March.    He has done well since and denies chest discomfort, [place], palpitations, PND/orthopnea, lightheadedness and syncope.    BPs at home have been 120s systolic at home    Exercise: Two 18-20 minute miles per day plus isometric exercise.      Patient Active Problem List   Diagnosis    Benign essential hypertension    Gastroesophageal reflux disease without esophagitis    Primary insomnia    Erectile dysfunction    Loose stools    Borderline hyperlipidemia    Prostate cancer screening    Encounter for hepatitis C screening test for low risk patient    Chronic right shoulder pain    Chest pain    Positive cardiac stress test    Paroxysmal atrial fibrillation with rapid ventricular response    CAD with h/o left main stenosis    Stress hyperglycemia    S/P CABG (coronary artery bypass graft)    S/P left atrial appendage ligation       Current Outpatient Medications   Medication Sig    atorvastatin (LIPITOR) 80 MG tablet Take 1 tablet (80 mg total) by mouth once daily.    metoprolol tartrate 75 mg Tab Take 1 tablet (75 mg total) by mouth 2 (two) times daily.    aspirin (ECOTRIN) 81 MG EC tablet Take 1 tablet (81 mg total) by mouth once daily.     No current facility-administered medications for this visit.       ROS  The review of systems is negative except as above.     Objective:   Physical Exam  Vitals reviewed.   Constitutional:       Appearance: He is well-developed.   HENT:      Head: Normocephalic and atraumatic.   Eyes:      General: No scleral icterus.     Conjunctiva/sclera: Conjunctivae normal.   Neck:      Vascular: No JVD.   Cardiovascular:      Rate and Rhythm:  Normal rate and regular rhythm.      Pulses: Intact distal pulses.      Heart sounds: Normal heart sounds. No murmur heard.     No friction rub. No gallop.   Pulmonary:      Effort: Pulmonary effort is normal.      Breath sounds: Normal breath sounds. No wheezing or rales.   Abdominal:      General: Bowel sounds are normal. There is no distension.      Palpations: Abdomen is soft.      Tenderness: There is no abdominal tenderness.   Musculoskeletal:         General: Normal range of motion.      Cervical back: Normal range of motion and neck supple.   Skin:     General: Skin is warm and dry.      Findings: No erythema or rash.   Neurological:      Mental Status: He is alert and oriented to person, place, and time.   Psychiatric:         Behavior: Behavior normal.         Thought Content: Thought content normal.         Judgment: Judgment normal.         Lab Results   Component Value Date    WBC 8.53 03/31/2024    HGB 9.2 (L) 03/31/2024    HCT 28.3 (L) 03/31/2024    MCV 92 03/31/2024     03/31/2024         Chemistry        Component Value Date/Time     03/31/2024 0607    K 3.6 03/31/2024 0607     03/31/2024 0607    CO2 25 03/31/2024 0607    BUN 31 (H) 03/31/2024 0607    CREATININE 0.9 03/31/2024 0607    GLU 95 03/31/2024 0607        Component Value Date/Time    CALCIUM 8.6 (L) 03/31/2024 0607    ALKPHOS 40 (L) 03/31/2024 0607    AST 22 03/31/2024 0607    ALT 21 03/31/2024 0607    BILITOT 0.7 03/31/2024 0607    ESTGFRAFRICA >60.0 04/21/2022 0856    EGFRNONAA >60.0 04/21/2022 0856            Lab Results   Component Value Date    CHOL 169 04/25/2023    CHOL 198 04/21/2022    CHOL 207 (H) 04/15/2021     Lab Results   Component Value Date    HDL 48 04/25/2023    HDL 48 04/21/2022    HDL 45 04/15/2021     Lab Results   Component Value Date    LDLCALC 112.4 04/25/2023    LDLCALC 136.2 04/21/2022    LDLCALC 148.6 04/15/2021     Lab Results   Component Value Date    TRIG 43 04/25/2023    TRIG 69 04/21/2022  "   TRIG 67 04/15/2021     Lab Results   Component Value Date    CHOLHDL 28.4 04/25/2023    CHOLHDL 24.2 04/21/2022    CHOLHDL 21.7 04/15/2021       Lab Results   Component Value Date    TSH 1.192 09/29/2020       No results found for: "HGBA1C"    Assessment:     1. CAD with h/o left main stenosis    2. S/P CABG (coronary artery bypass graft)    3. Coronary artery disease, unspecified vessel or lesion type, unspecified whether angina present, unspecified whether native or transplanted heart    4. Benign essential hypertension    5. Borderline hyperlipidemia    6. S/P left atrial appendage ligation        Plan:     Continue current medicines.  Add back valsartan 80.    Offered cardiac rehab but he needs it a lot less than the average patient.    Lipid panel due.  LDL goal < 55.    Diet/exercise goals reinforced.    F/U 6 months            "

## 2024-06-28 ENCOUNTER — PATIENT MESSAGE (OUTPATIENT)
Dept: SURGERY | Facility: CLINIC | Age: 74
End: 2024-06-28
Payer: MEDICARE

## 2024-07-01 ENCOUNTER — LAB VISIT (OUTPATIENT)
Dept: LAB | Facility: HOSPITAL | Age: 74
End: 2024-07-01
Attending: INTERNAL MEDICINE
Payer: MEDICARE

## 2024-07-01 ENCOUNTER — PATIENT MESSAGE (OUTPATIENT)
Dept: SURGERY | Facility: CLINIC | Age: 74
End: 2024-07-01
Payer: COMMERCIAL

## 2024-07-01 DIAGNOSIS — K40.30 NON-RECURRENT UNILATERAL INGUINAL HERNIA WITH OBSTRUCTION WITHOUT GANGRENE: Primary | ICD-10-CM

## 2024-07-01 DIAGNOSIS — I25.10 CORONARY ARTERY DISEASE, UNSPECIFIED VESSEL OR LESION TYPE, UNSPECIFIED WHETHER ANGINA PRESENT, UNSPECIFIED WHETHER NATIVE OR TRANSPLANTED HEART: ICD-10-CM

## 2024-07-01 LAB
CHOLEST SERPL-MCNC: 104 MG/DL (ref 120–199)
CHOLEST/HDLC SERPL: 2.4 {RATIO} (ref 2–5)
HDLC SERPL-MCNC: 43 MG/DL (ref 40–75)
HDLC SERPL: 41.3 % (ref 20–50)
LDLC SERPL CALC-MCNC: 49.8 MG/DL (ref 63–159)
NONHDLC SERPL-MCNC: 61 MG/DL
TRIGL SERPL-MCNC: 56 MG/DL (ref 30–150)

## 2024-07-01 PROCEDURE — 36415 COLL VENOUS BLD VENIPUNCTURE: CPT | Performed by: INTERNAL MEDICINE

## 2024-07-01 PROCEDURE — 80061 LIPID PANEL: CPT | Performed by: INTERNAL MEDICINE

## 2024-07-10 ENCOUNTER — PATIENT MESSAGE (OUTPATIENT)
Dept: CARDIOLOGY | Facility: CLINIC | Age: 74
End: 2024-07-10
Payer: COMMERCIAL

## 2024-07-18 RX ORDER — METOPROLOL SUCCINATE 100 MG/1
100 TABLET, EXTENDED RELEASE ORAL DAILY
Qty: 90 TABLET | Refills: 3 | Status: SHIPPED | OUTPATIENT
Start: 2024-07-18 | End: 2025-07-18

## 2024-07-18 NOTE — TELEPHONE ENCOUNTER
Dr Lazcano was updated and stated:  I would say Toprol  is fine, but that's a little hard to prescribe. Given that he has a normal ejection fraction, lets go with Toprol 100 daily. I'll place the order now.     Pt updated on the med change and verbalized understanding.

## 2024-07-22 ENCOUNTER — ANESTHESIA EVENT (OUTPATIENT)
Dept: SURGERY | Facility: HOSPITAL | Age: 74
End: 2024-07-22
Payer: COMMERCIAL

## 2024-07-22 ENCOUNTER — TELEPHONE (OUTPATIENT)
Dept: SURGERY | Facility: CLINIC | Age: 74
End: 2024-07-22
Payer: COMMERCIAL

## 2024-07-23 ENCOUNTER — ANESTHESIA (OUTPATIENT)
Dept: SURGERY | Facility: HOSPITAL | Age: 74
End: 2024-07-23
Payer: COMMERCIAL

## 2024-07-23 ENCOUNTER — HOSPITAL ENCOUNTER (OUTPATIENT)
Facility: HOSPITAL | Age: 74
Discharge: HOME | End: 2024-07-23
Attending: STUDENT IN AN ORGANIZED HEALTH CARE EDUCATION/TRAINING PROGRAM | Admitting: STUDENT IN AN ORGANIZED HEALTH CARE EDUCATION/TRAINING PROGRAM
Payer: COMMERCIAL

## 2024-07-23 VITALS
BODY MASS INDEX: 23.45 KG/M2 | HEART RATE: 66 BPM | OXYGEN SATURATION: 99 % | RESPIRATION RATE: 17 BRPM | TEMPERATURE: 98 F | DIASTOLIC BLOOD PRESSURE: 87 MMHG | WEIGHT: 158.31 LBS | SYSTOLIC BLOOD PRESSURE: 143 MMHG | HEIGHT: 69 IN

## 2024-07-23 DIAGNOSIS — K40.30 NON-RECURRENT UNILATERAL INGUINAL HERNIA WITH OBSTRUCTION WITHOUT GANGRENE: ICD-10-CM

## 2024-07-23 PROCEDURE — 25000003 PHARM REV CODE 250: Performed by: STUDENT IN AN ORGANIZED HEALTH CARE EDUCATION/TRAINING PROGRAM

## 2024-07-23 PROCEDURE — 63600175 PHARM REV CODE 636 W HCPCS: Mod: JZ,JG | Performed by: STUDENT IN AN ORGANIZED HEALTH CARE EDUCATION/TRAINING PROGRAM

## 2024-07-23 PROCEDURE — 37000008 HC ANESTHESIA 1ST 15 MINUTES: Performed by: STUDENT IN AN ORGANIZED HEALTH CARE EDUCATION/TRAINING PROGRAM

## 2024-07-23 PROCEDURE — 63600175 PHARM REV CODE 636 W HCPCS: Performed by: STUDENT IN AN ORGANIZED HEALTH CARE EDUCATION/TRAINING PROGRAM

## 2024-07-23 PROCEDURE — 71000044 HC DOSC ROUTINE RECOVERY FIRST HOUR: Performed by: STUDENT IN AN ORGANIZED HEALTH CARE EDUCATION/TRAINING PROGRAM

## 2024-07-23 PROCEDURE — 88302 TISSUE EXAM BY PATHOLOGIST: CPT | Performed by: PATHOLOGY

## 2024-07-23 PROCEDURE — 36000706: Performed by: STUDENT IN AN ORGANIZED HEALTH CARE EDUCATION/TRAINING PROGRAM

## 2024-07-23 PROCEDURE — C1781 MESH (IMPLANTABLE): HCPCS | Performed by: STUDENT IN AN ORGANIZED HEALTH CARE EDUCATION/TRAINING PROGRAM

## 2024-07-23 PROCEDURE — 36000707: Performed by: STUDENT IN AN ORGANIZED HEALTH CARE EDUCATION/TRAINING PROGRAM

## 2024-07-23 PROCEDURE — 88304 TISSUE EXAM BY PATHOLOGIST: CPT | Performed by: PATHOLOGY

## 2024-07-23 PROCEDURE — 37000009 HC ANESTHESIA EA ADD 15 MINS: Performed by: STUDENT IN AN ORGANIZED HEALTH CARE EDUCATION/TRAINING PROGRAM

## 2024-07-23 PROCEDURE — 71000015 HC POSTOP RECOV 1ST HR: Performed by: STUDENT IN AN ORGANIZED HEALTH CARE EDUCATION/TRAINING PROGRAM

## 2024-07-23 PROCEDURE — 49505 PRP I/HERN INIT REDUC >5 YR: CPT | Mod: RT,,, | Performed by: STUDENT IN AN ORGANIZED HEALTH CARE EDUCATION/TRAINING PROGRAM

## 2024-07-23 DEVICE — MESH KNITTED ST 3 X 6: Type: IMPLANTABLE DEVICE | Site: INGUINAL | Status: FUNCTIONAL

## 2024-07-23 RX ORDER — ACETAMINOPHEN 500 MG
1000 TABLET ORAL ONCE
Status: COMPLETED | OUTPATIENT
Start: 2024-07-23 | End: 2024-07-23

## 2024-07-23 RX ORDER — FENTANYL CITRATE 50 UG/ML
INJECTION, SOLUTION INTRAMUSCULAR; INTRAVENOUS
Status: DISCONTINUED | OUTPATIENT
Start: 2024-07-23 | End: 2024-07-23

## 2024-07-23 RX ORDER — ONDANSETRON HYDROCHLORIDE 2 MG/ML
4 INJECTION, SOLUTION INTRAVENOUS DAILY PRN
OUTPATIENT
Start: 2024-07-23

## 2024-07-23 RX ORDER — PHENYLEPHRINE HCL IN 0.9% NACL 1 MG/10 ML
SYRINGE (ML) INTRAVENOUS
Status: DISCONTINUED | OUTPATIENT
Start: 2024-07-23 | End: 2024-07-23

## 2024-07-23 RX ORDER — IBUPROFEN 600 MG/1
600 TABLET ORAL EVERY 6 HOURS PRN
COMMUNITY
Start: 2024-07-23

## 2024-07-23 RX ORDER — FENTANYL CITRATE 50 UG/ML
25 INJECTION, SOLUTION INTRAMUSCULAR; INTRAVENOUS EVERY 5 MIN PRN
OUTPATIENT
Start: 2024-07-23

## 2024-07-23 RX ORDER — CEFAZOLIN SODIUM 1 G/3ML
INJECTION, POWDER, FOR SOLUTION INTRAMUSCULAR; INTRAVENOUS
Status: DISCONTINUED | OUTPATIENT
Start: 2024-07-23 | End: 2024-07-23

## 2024-07-23 RX ORDER — PROPOFOL 10 MG/ML
VIAL (ML) INTRAVENOUS
Status: DISCONTINUED | OUTPATIENT
Start: 2024-07-23 | End: 2024-07-23

## 2024-07-23 RX ORDER — ROCURONIUM BROMIDE 10 MG/ML
INJECTION, SOLUTION INTRAVENOUS
Status: DISCONTINUED | OUTPATIENT
Start: 2024-07-23 | End: 2024-07-23

## 2024-07-23 RX ORDER — DEXTROMETHORPHAN HYDROBROMIDE, GUAIFENESIN 5; 100 MG/5ML; MG/5ML
650 LIQUID ORAL EVERY 8 HOURS
COMMUNITY
Start: 2024-07-23

## 2024-07-23 RX ORDER — BUPIVACAINE HYDROCHLORIDE 2.5 MG/ML
INJECTION, SOLUTION EPIDURAL; INFILTRATION; INTRACAUDAL
Status: DISCONTINUED | OUTPATIENT
Start: 2024-07-23 | End: 2024-07-23 | Stop reason: HOSPADM

## 2024-07-23 RX ORDER — GLUCAGON 1 MG
1 KIT INJECTION
OUTPATIENT
Start: 2024-07-23

## 2024-07-23 RX ORDER — DEXAMETHASONE SODIUM PHOSPHATE 4 MG/ML
INJECTION, SOLUTION INTRA-ARTICULAR; INTRALESIONAL; INTRAMUSCULAR; INTRAVENOUS; SOFT TISSUE
Status: DISCONTINUED | OUTPATIENT
Start: 2024-07-23 | End: 2024-07-23

## 2024-07-23 RX ORDER — ONDANSETRON HYDROCHLORIDE 2 MG/ML
INJECTION, SOLUTION INTRAVENOUS
Status: DISCONTINUED | OUTPATIENT
Start: 2024-07-23 | End: 2024-07-23

## 2024-07-23 RX ORDER — SODIUM CHLORIDE 0.9 % (FLUSH) 0.9 %
10 SYRINGE (ML) INJECTION
OUTPATIENT
Start: 2024-07-23

## 2024-07-23 RX ORDER — OXYCODONE HYDROCHLORIDE 5 MG/1
5 TABLET ORAL ONCE
Status: DISCONTINUED | OUTPATIENT
Start: 2024-07-23 | End: 2024-07-23 | Stop reason: HOSPADM

## 2024-07-23 RX ORDER — LIDOCAINE HYDROCHLORIDE 20 MG/ML
INJECTION, SOLUTION EPIDURAL; INFILTRATION; INTRACAUDAL; PERINEURAL
Status: DISCONTINUED | OUTPATIENT
Start: 2024-07-23 | End: 2024-07-23

## 2024-07-23 RX ORDER — PHENYLEPHRINE HYDROCHLORIDE 10 MG/ML
INJECTION INTRAVENOUS
Status: DISCONTINUED | OUTPATIENT
Start: 2024-07-23 | End: 2024-07-23

## 2024-07-23 RX ORDER — DEXMEDETOMIDINE HYDROCHLORIDE 100 UG/ML
INJECTION, SOLUTION INTRAVENOUS
Status: DISCONTINUED | OUTPATIENT
Start: 2024-07-23 | End: 2024-07-23

## 2024-07-23 RX ORDER — SODIUM CHLORIDE 9 MG/ML
INJECTION, SOLUTION INTRAVENOUS CONTINUOUS
Status: DISCONTINUED | OUTPATIENT
Start: 2024-07-23 | End: 2024-07-23 | Stop reason: HOSPADM

## 2024-07-23 RX ADMIN — CEFAZOLIN 2 G: 330 INJECTION, POWDER, FOR SOLUTION INTRAMUSCULAR; INTRAVENOUS at 01:07

## 2024-07-23 RX ADMIN — ONDANSETRON 4 MG: 2 INJECTION INTRAMUSCULAR; INTRAVENOUS at 03:07

## 2024-07-23 RX ADMIN — Medication 100 MCG: at 01:07

## 2024-07-23 RX ADMIN — Medication 150 MCG: at 02:07

## 2024-07-23 RX ADMIN — Medication 150 MCG: at 01:07

## 2024-07-23 RX ADMIN — DEXMEDETOMIDINE 4 MCG: 200 INJECTION, SOLUTION INTRAVENOUS at 02:07

## 2024-07-23 RX ADMIN — SODIUM CHLORIDE: 0.9 INJECTION, SOLUTION INTRAVENOUS at 01:07

## 2024-07-23 RX ADMIN — DEXMEDETOMIDINE 12 MCG: 200 INJECTION, SOLUTION INTRAVENOUS at 01:07

## 2024-07-23 RX ADMIN — PROPOFOL 170 MG: 10 INJECTION, EMULSION INTRAVENOUS at 01:07

## 2024-07-23 RX ADMIN — DEXAMETHASONE SODIUM PHOSPHATE 4 MG: 4 INJECTION INTRA-ARTICULAR; INTRALESIONAL; INTRAMUSCULAR; INTRAVENOUS; SOFT TISSUE at 01:07

## 2024-07-23 RX ADMIN — SUGAMMADEX 200 MG: 100 INJECTION, SOLUTION INTRAVENOUS at 03:07

## 2024-07-23 RX ADMIN — ROCURONIUM BROMIDE 20 MG: 10 INJECTION INTRAVENOUS at 02:07

## 2024-07-23 RX ADMIN — SODIUM CHLORIDE: 9 INJECTION, SOLUTION INTRAVENOUS at 11:07

## 2024-07-23 RX ADMIN — FENTANYL CITRATE 100 MCG: 50 INJECTION INTRAMUSCULAR; INTRAVENOUS at 01:07

## 2024-07-23 RX ADMIN — LIDOCAINE HYDROCHLORIDE 80 MG: 20 INJECTION, SOLUTION EPIDURAL; INFILTRATION; INTRACAUDAL at 01:07

## 2024-07-23 RX ADMIN — ROCURONIUM BROMIDE 50 MG: 10 INJECTION INTRAVENOUS at 01:07

## 2024-07-23 RX ADMIN — PHENYLEPHRINE HYDROCHLORIDE 0.2 MCG/KG/MIN: 10 INJECTION INTRAVENOUS at 02:07

## 2024-07-23 RX ADMIN — ACETAMINOPHEN 1000 MG: 500 TABLET ORAL at 11:07

## 2024-07-23 NOTE — PLAN OF CARE
Pt discharged per orders. AAOx'a 3. VSS. No s/s of acute distress. Resp even and unlabored. No complaints of pain verbalized. IV removed prior to discharge.Reviewed discharge instructions, follow up care/appointments with patient and son. Patient and son verbalized understanding of discharge and follow up care/appointments. Discharged with all personal belongings.Escorted out with staff in wheelchair.Pt transported home via personal transportation.

## 2024-07-23 NOTE — PLAN OF CARE
Updated pt and spouse regarding new OR start time, currently 1303. Pt denies needs. Call light in reach, bed low and locked.

## 2024-07-23 NOTE — BRIEF OP NOTE
[facility] - Surgery ([facility])  Brief Operative Note    Surgery Date: 7/23/2024     Surgeons and Role:     * Andrei Marquez MD - Primary     * Jasiel Mcadams MD - Resident - Assisting    Pre-op Diagnosis:  Non-recurrent unilateral inguinal hernia with obstruction without gangrene [K40.30]    Post-op Diagnosis:  Post-Op Diagnosis Codes:     * Non-recurrent unilateral inguinal hernia with obstruction without gangrene [K40.30]    Procedure(s) (LRB):  REPAIR, HERNIA, INGUINAL, WITHOUT HISTORY OF PRIOR REPAIR, AGE 3 YEARS OR OLDER, Right with mesh (Right)    Anesthesia: General    Operative Findings: right inguinal cord lipoma, right indirect inguinal hernia, kaya repair performed.    Estimated Blood Loss: <5cc         Specimens:   Specimen (24h ago, onward)       Start     Ordered    07/23/24 1419  Specimen to Pathology, Surgery General Surgery  Once        Comments: Pre-op Diagnosis: Non-recurrent unilateral inguinal hernia with obstruction without gangrene [K40.30]Procedure(s):REPAIR, HERNIA, INGUINAL, WITHOUT HISTORY OF PRIOR REPAIR, AGE 3 YEARS OR OLDER, Right with mesh Number of specimens: 1Name of specimens: 1. Right cord lipoma - perm2. Right inguinal hernia sac - perm     References:    Click here for ordering Quick Tip   Question Answer Comment   Procedure Type: General Surgery    Specimen Class: Routine/Screening    Release to patient Immediate        07/23/24 1510                      Discharge Note    OUTCOME: Patient tolerated treatment/procedure well without complication and is now ready for discharge.    DISPOSITION: Home or Self Care    FINAL DIAGNOSIS:  Right groin bulge    FOLLOWUP: In clinic    DISCHARGE INSTRUCTIONS: Pt instructions.    Jasiel Mcadams MD  General Surgery PGY-2

## 2024-07-23 NOTE — ANESTHESIA PREPROCEDURE EVALUATION
[facility]  Anesthesia Pre-Operative Evaluation         Patient Name: Gerald Wilkinson  YOB: 1950  MRN: [id number]    SUBJECTIVE:     Pre-operative evaluation for Procedure(s) (LRB):  REPAIR, HERNIA, INGUINAL, WITHOUT HISTORY OF PRIOR REPAIR, AGE 3 YEARS OR OLDER, Right with mesh (Right)     07/23/2024    Gerald Wilkinson is a 73 y.o. male w/ a significant PMHx of paroxysmal afib (not on AC), CAD s/p CABG x2 (03/27/24) and L atrial appendage removal, GERD, HTN, HLD, and R inguinal hernia.    Patient now presents for the above procedure(s).      LDA:      Prev airway:  Date/Time: 3/27/2024 8:13 AM     Performed by: Lucy Rivera MD  Authorized by: William Gant MD    Intubation:     Induction:  Intravenous    Intubated:  Postinduction    Mask Ventilation:  Easy mask    Attempts:  1    Attempted By:  Staff anesthesiologist    Method of Intubation:  Direct    Blade:  Campuzano 3    Laryngeal View Grade: Grade I - full view of cords      Difficult Airway Encountered?: No      Complications:  None    Airway Device:  Oral endotracheal tube    Airway Device Size:  7.5    Style/Cuff Inflation:  Cuffed (inflated to minimal occlusive pressure)    Placement Verified By:  Capnometry    Complicating Factors:  None    Findings Post-Intubation:  Atraumatic/condition of teeth unchanged and BS equal bilateral    TTE (03/25/24): prior to CABG    Left Ventricle: The left ventricle is normal in size. Normal wall thickness. There is concentric remodeling. Normal wall motion. There is normal systolic function with a visually estimated ejection fraction of 60 - 65%. There is normal diastolic function.    Right Ventricle: Mild right ventricular enlargement. Wall thickness is normal. Right ventricle wall motion  is normal. Systolic function is normal.    Pulmonary Artery: The estimated pulmonary artery systolic pressure is 26 mmHg.    IVC/SVC: Normal venous pressure at 3 mmHg.    Cardiac Cath (03/22/24):  prior to CABG  Critical LM stenosis with multiple targets in a left dominant system.     Drips: None documented.    Patient Active Problem List   Diagnosis    Benign essential hypertension    Gastroesophageal reflux disease without esophagitis    Primary insomnia    Erectile dysfunction    Loose stools    Borderline hyperlipidemia    Prostate cancer screening    Encounter for hepatitis C screening test for low risk patient    Chronic right shoulder pain    Chest pain    Positive cardiac stress test    Paroxysmal atrial fibrillation with rapid ventricular response    CAD with h/o left main stenosis    Stress hyperglycemia    S/P CABG (coronary artery bypass graft)    S/P left atrial appendage ligation       Review of patient's allergies indicates:  No Known Allergies    Current Outpatient Medications:  No current facility-administered medications for this encounter.    Current Outpatient Medications:     aspirin (ECOTRIN) 81 MG EC tablet, Take 1 tablet (81 mg total) by mouth once daily., Disp: 30 tablet, Rfl: 0    metoprolol succinate (TOPROL-XL) 100 MG 24 hr tablet, Take 1 tablet (100 mg total) by mouth once daily., Disp: 90 tablet, Rfl: 3    valsartan (DIOVAN) 80 MG tablet, Take 1 tablet (80 mg total) by mouth once daily., Disp: 90 tablet, Rfl: 3    atorvastatin (LIPITOR) 80 MG tablet, Take 1 tablet (80 mg total) by mouth once daily., Disp: 30 tablet, Rfl: 3    Past Surgical History:   Procedure Laterality Date    CORONARY ANGIOGRAPHY N/A 3/22/2024    Procedure: ANGIOGRAM, CORONARY ARTERY;  Surgeon: Yumi Bal MD;  Location: [facility] CATH LAB;  Service: Cardiology;  Laterality: N/A;    CORONARY ARTERY BYPASS GRAFT (CABG) N/A 3/27/2024    Procedure: CORONARY ARTERY BYPASS GRAFT (CABG);  Surgeon: Tony Canas MD;  Location: [facility] OR [address] [place] [address];  Service: Cardiovascular;  Laterality: N/A;  left atrial appendage resection    ENDOSCOPIC HARVEST OF VEIN Left 3/27/2024    Procedure: HARVEST-VEIN-ENDOVASCULAR;  Surgeon:  Tony Canas MD;  Location: [facility] OR [facility] FLR;  Service: Cardiovascular;  Laterality: Left;  VEIN HARVEST START 0917  VEIN OUT AND PREP START 0945  VEIN READY 1009    LEFT HEART CATHETERIZATION Left 3/22/2024    Procedure: Left heart cath;  Surgeon: Yumi Bal MD;  Location: [facility] CATH LAB;  Service: Cardiology;  Laterality: Left;       Social History     Socioeconomic History    Marital status:    Tobacco Use    Smoking status: Never    Smokeless tobacco: Never   Substance and Sexual Activity    Alcohol use: No    Drug use: Yes    Sexual activity: Yes     Partners: Female     Social Determinants of Health     Financial Resource Strain: Low Risk  (3/23/2024)    Overall Financial Resource Strain (CARDIA)     Difficulty of Paying Living Expenses: Not hard at all   Food Insecurity: No Food Insecurity (3/23/2024)    Hunger Vital Sign     Worried About Running Out of Food in the Last Year: Never true     Ran Out of Food in the Last Year: Never true   Transportation Needs: No Transportation Needs (3/23/2024)    PRAPARE - Transportation     Lack of Transportation (Medical): No     Lack of Transportation (Non-Medical): No   Physical Activity: Sufficiently Active (8/17/2020)    Exercise Vital Sign     Days of Exercise per Week: 7 days     Minutes of Exercise per Session: 60 min   Stress: No Stress Concern Present (3/23/2024)    [employer] - Occupational Stress Questionnaire     Feeling of Stress : Not at all   Housing Stability: Low Risk  (3/23/2024)    Housing Stability Vital Sign     Unable to Pay for Housing in the Last Year: No     Number of Places Lived in the Last Year: 1     Unstable Housing in the Last Year: No       OBJECTIVE:     Vital Signs Range (Last 24H):         Significant Labs:  Lab Results   Component Value Date    WBC 8.53 03/31/2024    HGB 9.2 (L) 03/31/2024    HCT 28.3 (L) 03/31/2024     03/31/2024    CHOL 104 (L) 07/01/2024    TRIG 56 07/01/2024    HDL 43  "07/01/2024    ALT 21 03/31/2024    AST 22 03/31/2024     03/31/2024    K 3.6 03/31/2024     03/31/2024    CREATININE 0.9 03/31/2024    BUN 31 (H) 03/31/2024    CO2 25 03/31/2024    TSH 1.192 09/29/2020    PSA 3.5 04/25/2023    INR 1.0 03/30/2024       Diagnostic Studies: No relevant studies.    EKG:   Results for orders placed or performed during the hospital encounter of 04/22/24   EKG 12-lead    Collection Time: 04/22/24  9:17 AM   Result Value Ref Range    QRS Duration 126 ms    [facility] QTC Calculation 452 ms    Narrative    Test Reason : Z95.1,    Vent. Rate : 076 BPM     Atrial Rate : 076 BPM     P-R Int : 138 ms          QRS Dur : 126 ms      QT Int : 402 ms       P-R-T Axes : 034 077 030 degrees     QTc Int : 452 ms      Normal sinus rhythm  Right bundle branch block  Abnormal ECG  When compared with ECG of 28-MAR-2024 14:23,  ST no longer elevated in Anterior leads  Confirmed by Karla Landry MD ([id number]) on 4/22/2024 11:59:34 AM    Referred By: ERLINDA SYED           Confirmed By:Karla Landry MD       2D ECHO:  TTE:  Results for orders placed or performed during the hospital encounter of 03/22/24   Echo   Result Value Ref Range    RA Width 3.81 cm    LA volume (mod) 37.03 cm3    Left Atrium Major Axis 3.96 cm    Left Atrium Minor Axis 4.33 cm    RA Major Axis 4.35 cm    LV Diastolic Volume 77.97 mL    LV Systolic Volume 29.40 mL    PV Peak D Mindy 0.33 m/s    PV Peak S Mindy 0.68 m/s    MV Peak A Mindy 0.64 m/s    MV stenosis pressure 1/2 time 76.99 ms    TR Max Mindy 2.42 m/s    MV Peak E Mindy 0.58 m/s    Ao VTI 22.06 cm    Ao peak mindy 1.27 m/s    LVOT peak VTI 17.90 cm    LVOT peak mindy 0.80 m/s    LVOT diameter 2.06 cm    MV "A" wave duration 4.57 msec    E wave deceleration time 265.50 msec    AV mean gradient 4 mmHg    TAPSE 1.81 cm    RVDD 4.20 cm    LA size 3.00 cm    Ascending aorta 3.42 cm    STJ 3.26 cm    Sinus 3.46 cm    LVIDs 2.79 2.1 - 4.0 cm    Posterior Wall 0.91 0.6 - 1.1 cm    IVS 0.70 " 0.6 - 1.1 cm    LVIDd 4.19 3.5 - 6.0 cm    TDI LATERAL 0.09 m/s    LA WIDTH 3.44 cm    TDI SEPTAL 0.05 m/s    LV LATERAL E/E' RATIO 6.44 m/s    LV SEPTAL E/E' RATIO 11.60 m/s    FS 33 28 - 44 %    LA volume 36.29 cm3    LV mass 101.73 g    ZLVIDD -2.18     ZLVIDS -1.12     Left Ventricle Relative Wall Thickness 0.43 cm    AV valve area 2.70 cm²    AV Velocity Ratio 0.63     AV index (prosthetic) 0.81     MV valve area p 1/2 method 2.86 cm2    E/A ratio 0.91     Mean e' 0.07 m/s    Pulm vein S/D ratio 2.06     LVOT area 3.3 cm2    LVOT stroke volume 59.63 cm3    AV peak gradient 6 mmHg    E/E' ratio 8.29 m/s    LV Systolic Volume Index 15.7 mL/m2    LV Diastolic Volume Index 41.70 mL/m2    LA Volume Index 19.4 mL/m2    LV Mass Index 54 g/m2    Triscuspid Valve Regurgitation Peak Gradient 23 mmHg    LA Volume Index (Mod) 19.8 mL/m2    JUAN CARLOS by Velocity Ratio 2.10 cm²    BSA 1.86 m2    TV resting pulmonary artery pressure 26 mmHg    RV TB RVSP 5 mmHg    Est. RA pres 3 mmHg    Narrative      Left Ventricle: The left ventricle is normal in size. Normal wall   thickness. There is concentric remodeling. Normal wall motion. There is   normal systolic function with a visually estimated ejection fraction of 60   - 65%. There is normal diastolic function.    Right Ventricle: Mild right ventricular enlargement. Wall thickness is   normal. Right ventricle wall motion  is normal. Systolic function is   normal.    Pulmonary Artery: The estimated pulmonary artery systolic pressure is   26 mmHg.    IVC/SVC: Normal venous pressure at 3 mmHg.         MARIA C:  No results found for this or any previous visit.    ASSESSMENT/PLAN:           Pre-op Assessment    I have reviewed the Patient Summary Reports.     I have reviewed the Nursing Notes. I have reviewed the NPO Status.   I have reviewed the Medications.     Review of Systems  Anesthesia Hx:  No problems with previous Anesthesia   History of prior surgery of interest to airway management  or planning: heart surgery. Previous anesthesia: General        Denies Family Hx of Anesthesia complications.    Denies Personal Hx of Anesthesia complications.                    Social:  Non-Smoker       Cardiovascular:     Hypertension   CAD   CABG/stent (March 2024) Dysrhythmias atrial fibrillation      hyperlipidemia                             Hepatic/GI:     GERD                 Physical Exam  General: Alert and Oriented    Airway:  Mallampati: III / II  Mouth Opening: Normal  TM Distance: Normal  Tongue: Normal  Neck ROM: Normal ROM    Dental:  Intact    Chest/Lungs:  Normal Respiratory Rate    Heart:  Rate: Bradycardia        Anesthesia Plan  Type of Anesthesia, risks & benefits discussed:    Anesthesia Type: Gen ETT  Intra-op Monitoring Plan: Standard ASA Monitors  Post Op Pain Control Plan: multimodal analgesia and IV/PO Opioids PRN  Induction:  IV  Airway Plan: Direct and Video, Post-Induction  Informed Consent: Informed consent signed with the Patient and all parties understand the risks and agree with anesthesia plan.  All questions answered.   ASA Score: 3  Day of Surgery Review of History & Physical: H&P Update referred to the surgeon/provider.    Ready For Surgery From Anesthesia Perspective.     .

## 2024-07-23 NOTE — DISCHARGE INSTRUCTIONS
Postoperative General Instructions    What to Expect:  It is normal to experience pain and swelling at the surgical site.  The pain usually decreases significantly after the first week but may last for many weeks.  Each day, the pain should be similar or better to the previous day. If it worsens, call the doctor's office.     Activity:  You should walk beginning on the day of surgery and increase activity slowly over the next two to four weeks.  Do not drive while taking prescription pain medication.  You may return to work when you feel ready.  Do not lift anything heavier than 10 lbs for 6 weeks     Wound Care:  You may shower. Let soap and water run over the incisions and pat dry. Do not submerge in a bathtub or pool.     Diet:  You may resume your normal diet postoperatively.  Take a stool softener or laxative to avoid constipation.     Medication:  Pain Control  Take acetaminophen (Tylenol) 650 mg 4 times daily as needed for pain.  Take ibuprofen 600 mg 3 times daily as needed for pain. Stop taking this medication if it causes an upset stomach.  Take the prescribed Oxycodone 5 mg as needed if you have pain that is not controlled by acetaminophen and ibuprofen.  Bowel Regularity  Take an over-the-counter stool softener/laxative (Colace, Miralax, or Milk of Magnesia) to avoid constipation.  Previous Home Medication  Restart your previous home medication unless otherwise instructed.    Call Your Doctor's Office If You Experience:  Fevers greater than 101.3°F.  Vomiting.  Spreading redness or drainage from the incisions.  Opening of the incisions.  Worsening pain not controlled by medication.  Chest pain or shortness of breath.    Follow-Up:  Follow-up with your surgeon as scheduled in 2 weeks.  If no follow-up appointment has been scheduled, call to schedule an appointment within 1-2 weeks of discharge.     Contact Information:  During normal business hours call the clinic with any questions or concerns. On  weekends and evenings call [phone] to have the operators page the surgery resident on call after hours with questions or concerns.

## 2024-07-23 NOTE — TRANSFER OF CARE
"Anesthesia Transfer of Care Note    Patient: Gerald Wilkinson    Procedure(s) Performed: Procedure(s) (LRB):  REPAIR, HERNIA, INGUINAL, WITHOUT HISTORY OF PRIOR REPAIR, AGE 3 YEARS OR OLDER, Right with mesh (Right)    Patient location: PACU    Anesthesia Type: general    Transport from OR: Transported from OR on 6-10 L/min O2 by face mask with adequate spontaneous ventilation    Post pain: adequate analgesia    Post assessment: no apparent anesthetic complications    Post vital signs: stable    Level of consciousness: sedated    Nausea/Vomiting: no nausea/vomiting    Complications: none    Transfer of care protocol was followed      Last vitals: Visit Vitals  BP (!) 180/83 (BP Location: Left arm, Patient Position: Lying)   Pulse (!) 54   Temp 36.2 °C (97.2 °F) (Skin)   Resp 20   Ht 5' 9" (1.753 m)   Wt 71.8 kg (158 lb 4.6 oz)   SpO2 99%   BMI 23.38 kg/m²     "

## 2024-07-23 NOTE — ANESTHESIA PROCEDURE NOTES
Intubation    Date/Time: 7/23/2024 1:28 PM    Performed by: Gabriela Rizo MD  Authorized by: Scott Goncalves MD    Intubation:     Induction:  Intravenous    Intubated:  Postinduction    Mask Ventilation:  Easy with oral airway    Attempts:  1    Attempted By:  Resident anesthesiologist    Method of Intubation:  Direct    Blade:  Mead 2    Laryngeal View Grade: Grade I - full view of cords      Difficult Airway Encountered?: No      Complications:  None    Airway Device:  Oral endotracheal tube    Airway Device Size:  7.5    Style/Cuff Inflation:  Cuffed (inflated to minimal occlusive pressure)    Tube secured:  22    Secured at:  The lips    Placement Verified By:  Capnometry    Complicating Factors:  None    Findings Post-Intubation:  Atraumatic/condition of teeth unchanged and BS equal bilateral

## 2024-07-23 NOTE — INTERVAL H&P NOTE
The patient has been examined and the H&P has been reviewed:    I concur with the findings and no changes have occurred since H&P was written.    Anesthesia/Surgery risks, benefits and alternative options discussed and understood by patient/family.    Jasiel Mcadams MD  General Surgery PGY-2

## 2024-07-25 NOTE — ANESTHESIA POSTPROCEDURE EVALUATION
Anesthesia Post Evaluation    Patient: Gerald Wilkinson    Procedure(s) Performed: Procedure(s) (LRB):  REPAIR, HERNIA, INGUINAL, WITHOUT HISTORY OF PRIOR REPAIR, AGE 5 YEARS OR OLDER, Right with mesh (Right)    Final Anesthesia Type: general      Patient location during evaluation: PACU  Patient participation: Yes- Able to Participate  Level of consciousness: awake and alert  Post-procedure vital signs: reviewed and stable  Pain management: adequate  Airway patency: patent    PONV status at discharge: No PONV  Anesthetic complications: no      Cardiovascular status: blood pressure returned to baseline  Respiratory status: unassisted  Hydration status: euvolemic  Follow-up not needed.              Vitals Value Taken Time   /87 07/23/24 1645   Temp 36.5 °C (97.7 °F) 07/23/24 1530   Pulse 66 07/23/24 1645   Resp 17 07/23/24 1645   SpO2 99 % 07/23/24 1645         No case tracking events are documented in the log.      Pain/Ciera Score: No data recorded

## 2024-07-25 NOTE — OP NOTE
[facility]  General Surgery  Operative Note    DATE OF PROCEDURE: 07/25/2024     PREOPERATIVE DIAGNOSIS: Right inguinal hernia.     POSTOPERATIVE DIAGNOSIS: Right inguinal hernia.     PROCEDURE PERFORMED: Right inguinal hernia repair with mesh.     ATTENDING SURGEON: Andrei Marquez M.D.     HOUSESTAFF SURGEON: Jasiel Mcadams M.D. (PGY-2)     ANESTHESIA: General anesthesia plus local using 0.25% Marcaine.     ESTIMATED BLOOD LOSS: Minimal.     FINDINGS: Moderate-sized right indirect inguinal hernia repaired with polypropylene mesh.    SPECIMEN:   Right cord lipoma  Right inguinal hernia sac     DRAINS: None.     COMPLICATIONS: None.     INDICATIONS: Gerald Wilkinson is a 73 y.o.male referred to my General Surgery Clinic with a history of a symptomatic, reducible right inguinal bulge. The history and exam were consistent with an inguinal hernia. We recommended an open inguinal hernia repair with mesh and the patient agreed to proceed. The patient signed informed consent and expressed understanding of the risks and benefits of surgery.     DESCRIPTION OF OPERATIVE PROCEDURE: The patient was identified in preoperative holding and brought back to the Operating Room. The patient was placed supine on the operating table and padded appropriately. Monitors were applied and monitored anesthesia care was initiated. His right groin was shaved with electric clippers and prepped and draped in the standard sterile surgical fashion. A timeout was performed and all team members present agreed this was the correct procedure on the correct side of the correct patient. We also confirmed administration of appropriate preoperative antibiotics.     We marked out an appropriate right inguinal incision and administered Marcaine. After assuring adequate local anesthesia, a 5 cm oblique skin incision was made. Subcutaneous tissue was divided with Bovie electrocautery. This dissection was carried down through Mitra  fascia down to the aponeurosis of the external oblique. The aponeurosis of the external oblique was incised in line with its fibers, first with a scalpel and then Metzenbaum scissors, and this incision was extended to the external spermatic ring. The inguinal canal contents were bluntly encircled, first digitally and then with a Penrose drain. Weitlaner retractors were placed to facilitate the dissection. We proceeded to identify an indirect inguinal hernia sac, which was carefully dissected away from the inguinal canal contents until it could be reduced into the defect. We resected a cord lipoma and passed it off as specimen. The hernia sac was ligated with a 2-0 Vicryl suture and passed off as specimen. We performed skeletonization of the spermatic cord. We then had appropriate borders of the inguinal canal identified and decided to repair the defect with a piece of polypropylene mesh. The mesh was cut to fit the defect appropriately and sewn in place with interrupted 2-0 PDS suture. Medially, the mesh was anchored to the fascia overlying the pubic tubercle. Inferiorly, the mesh was anchored to the shelving edge of the inguinal ligament. Superiorly, the mesh was anchored to the conjoined tendon. The tails of the mesh were brought together around the cord structures creating a new internal ring that just admitted the tip of the finger. The mesh was inspected and noted to lie in appropriate position without any redundancy or tension. The testicle was pulled back down to the scrotum and there was noted to be no tension on the cord structures. The aponeurosis of the external oblique was closed with a running 3-0 Vicryl suture. Mitra fascia was closed with several buried interrupted 3-0 Vicryl sutures and the skin was closed with a running subcuticular Monocryl suture. A sterile dressing was applied. The patient was then transported to the Recovery Room in stable condition. All sponge, instrument and needle counts  were correct at the end of the case. I was present and scrubbed for the entire procedure.

## 2024-07-26 LAB
FINAL PATHOLOGIC DIAGNOSIS: NORMAL
GROSS: NORMAL
Lab: NORMAL

## 2024-08-08 ENCOUNTER — OFFICE VISIT (OUTPATIENT)
Dept: SURGERY | Facility: CLINIC | Age: 74
End: 2024-08-08
Payer: COMMERCIAL

## 2024-08-08 VITALS
HEART RATE: 69 BPM | OXYGEN SATURATION: 99 % | SYSTOLIC BLOOD PRESSURE: 159 MMHG | BODY MASS INDEX: 23.66 KG/M2 | DIASTOLIC BLOOD PRESSURE: 72 MMHG | HEIGHT: 69 IN | WEIGHT: 159.75 LBS

## 2024-08-08 DIAGNOSIS — K40.30 NON-RECURRENT UNILATERAL INGUINAL HERNIA WITH OBSTRUCTION WITHOUT GANGRENE: Primary | ICD-10-CM

## 2024-08-08 PROCEDURE — 99999 PR PBB SHADOW E&M-EST. PATIENT-LVL III: CPT | Mod: PBBFAC,,, | Performed by: STUDENT IN AN ORGANIZED HEALTH CARE EDUCATION/TRAINING PROGRAM

## 2024-08-20 ENCOUNTER — PATIENT MESSAGE (OUTPATIENT)
Dept: CARDIOLOGY | Facility: CLINIC | Age: 74
End: 2024-08-20
Payer: MEDICARE

## 2024-08-20 RX ORDER — ATORVASTATIN CALCIUM 80 MG/1
80 TABLET, FILM COATED ORAL DAILY
Qty: 90 TABLET | Refills: 3 | Status: SHIPPED | OUTPATIENT
Start: 2024-08-20 | End: 2025-08-20

## 2024-09-20 ENCOUNTER — OFFICE VISIT (OUTPATIENT)
Dept: PRIMARY CARE CLINIC | Facility: CLINIC | Age: 74
End: 2024-09-20
Payer: MEDICARE

## 2024-09-20 ENCOUNTER — LAB VISIT (OUTPATIENT)
Dept: LAB | Facility: HOSPITAL | Age: 74
End: 2024-09-20
Attending: INTERNAL MEDICINE
Payer: MEDICARE

## 2024-09-20 VITALS
HEART RATE: 65 BPM | TEMPERATURE: 97 F | BODY MASS INDEX: 23.47 KG/M2 | OXYGEN SATURATION: 97 % | WEIGHT: 158.5 LBS | RESPIRATION RATE: 18 BRPM | SYSTOLIC BLOOD PRESSURE: 120 MMHG | HEIGHT: 69 IN | DIASTOLIC BLOOD PRESSURE: 80 MMHG

## 2024-09-20 DIAGNOSIS — I25.10 CORONARY ARTERY DISEASE INVOLVING NATIVE CORONARY ARTERY OF NATIVE HEART WITHOUT ANGINA PECTORIS: ICD-10-CM

## 2024-09-20 DIAGNOSIS — E78.5 BORDERLINE HYPERLIPIDEMIA: ICD-10-CM

## 2024-09-20 DIAGNOSIS — Z12.5 PROSTATE CANCER SCREENING: ICD-10-CM

## 2024-09-20 DIAGNOSIS — I11.9 HYPERTENSIVE HEART DISEASE WITHOUT HEART FAILURE: Primary | ICD-10-CM

## 2024-09-20 DIAGNOSIS — Z95.1 S/P CABG (CORONARY ARTERY BYPASS GRAFT): ICD-10-CM

## 2024-09-20 LAB — COMPLEXED PSA SERPL-MCNC: 2.8 NG/ML (ref 0–4)

## 2024-09-20 PROCEDURE — 99999 PR PBB SHADOW E&M-EST. PATIENT-LVL III: CPT | Mod: PBBFAC,,, | Performed by: INTERNAL MEDICINE

## 2024-09-20 PROCEDURE — 36415 COLL VENOUS BLD VENIPUNCTURE: CPT | Performed by: INTERNAL MEDICINE

## 2024-09-20 PROCEDURE — 84153 ASSAY OF PSA TOTAL: CPT | Performed by: INTERNAL MEDICINE

## 2024-09-20 NOTE — PROGRESS NOTES
[facility] Note    Chief Complaint      Chief Complaint   Patient presents with    Follow-up     6m       History of Present Illness      Gerald Wilkinson is a 74 y.o. male who presents today for   Chief Complaint   Patient presents with    Follow-up     6m   .  Patient comes to appointment here for 6m checkup for chronic issues as below . Since last visit had 2 vessel bypass in march 2024 . He is no seeing dr musa . Has right inguinal hernia  repair in July soon with dr plaza . He is compliant with his diet and exercise regimen . No labs due at this time .    HPI    No problem-specific Assessment & Plan notes found for this encounter.       Problem List Items Addressed This Visit          Cardiac/Vascular    Hypertensive heart disease without heart failure - Primary    Overview     bp well controlled cont current          Borderline hyperlipidemia    Overview     Stable on diet only needs repeat labs next visit          CAD with h/o left main stenosis    Overview     Stable on current reigmen cont asa liptor and toprol for secondary prevention           S/P CABG (coronary artery bypass graft)    Overview     Stable             Renal/    Prostate cancer screening    Overview     Check psa              Past Medical History:  History reviewed. No pertinent past medical history.    Past Surgical History:  Past Surgical History:   Procedure Laterality Date    CORONARY ANGIOGRAPHY N/A 3/22/2024    Procedure: ANGIOGRAM, CORONARY ARTERY;  Surgeon: Yumi Bal MD;  Location: [facility] CATH LAB;  Service: Cardiology;  Laterality: N/A;    CORONARY ARTERY BYPASS GRAFT (CABG) N/A 3/27/2024    Procedure: CORONARY ARTERY BYPASS GRAFT (CABG);  Surgeon: Tony Canas MD;  Location: [facility] OR [address] [place] [address];  Service: Cardiovascular;  Laterality: N/A;  left atrial appendage resection    ENDOSCOPIC HARVEST OF VEIN Left 3/27/2024    Procedure: HARVEST-VEIN-ENDOVASCULAR;  Surgeon: Tony Canas MD;  Location: [facility]  OR 2ND FLR;  Service: Cardiovascular;  Laterality: Left;  VEIN HARVEST START 0917  VEIN OUT AND PREP START 0945  VEIN READY 1009    LEFT HEART CATHETERIZATION Left 3/22/2024    Procedure: Left heart cath;  Surgeon: Yumi Bal MD;  Location: [facility] CATH LAB;  Service: Cardiology;  Laterality: Left;    REPAIR, HERNIA, INGUINAL, WITHOUT HISTORY OF PRIOR REPAIR, AGE 5 YEARS OR OLDER Right 7/23/2024    Procedure: REPAIR, HERNIA, INGUINAL, WITHOUT HISTORY OF PRIOR REPAIR, AGE 5 YEARS OR OLDER, Right with mesh;  Surgeon: Andrei Marquez MD;  Location: [facility] OR 2ND FLR;  Service: General;  Laterality: Right;       Family History:  family history includes Cancer in his mother and sister; Hypertension in his mother and sister.     Social History:  Social History     Socioeconomic History    Marital status:    Tobacco Use    Smoking status: Never    Smokeless tobacco: Never   Substance and Sexual Activity    Alcohol use: No    Drug use: Yes    Sexual activity: Yes     Partners: Female     Social Determinants of Health     Financial Resource Strain: Low Risk  (3/23/2024)    Overall Financial Resource Strain (CARDIA)     Difficulty of Paying Living Expenses: Not hard at all   Food Insecurity: No Food Insecurity (3/23/2024)    Hunger Vital Sign     Worried About Running Out of Food in the Last Year: Never true     Ran Out of Food in the Last Year: Never true   Transportation Needs: No Transportation Needs (3/23/2024)    PRAPARE - Transportation     Lack of Transportation (Medical): No     Lack of Transportation (Non-Medical): No   Physical Activity: Sufficiently Active (8/17/2020)    Exercise Vital Sign     Days of Exercise per Week: 7 days     Minutes of Exercise per Session: 60 min   Stress: No Stress Concern Present (3/23/2024)    [employer] - Occupational Stress Questionnaire     Feeling of Stress : Not at all   Housing Stability: Low Risk  (3/23/2024)    Housing Stability Vital Sign      Unable to Pay for Housing in the Last Year: No     Number of Places Lived in the Last Year: 1     Unstable Housing in the Last Year: No       Review of Systems:   Review of Systems   Constitutional:  Negative for fever and weight loss.   HENT:  Negative for congestion, hearing loss and sore throat.    Eyes:  Negative for blurred vision.   Respiratory:  Negative for cough and shortness of breath.    Cardiovascular:  Negative for chest pain, palpitations, claudication and leg swelling.   Gastrointestinal:  Negative for abdominal pain, constipation, diarrhea and heartburn.   Genitourinary:  Negative for dysuria.   Musculoskeletal:  Negative for back pain and myalgias.   Skin:  Negative for rash.   Neurological:  Negative for focal weakness and headaches.   Psychiatric/Behavioral:  Negative for depression and suicidal ideas. The patient is not nervous/anxious.         Medications:  Outpatient Encounter Medications as of 9/20/2024   Medication Sig Dispense Refill    acetaminophen (TYLENOL) 650 MG TbSR Take 1 tablet (650 mg total) by mouth every 8 (eight) hours.      aspirin (ECOTRIN) 81 MG EC tablet Take 1 tablet (81 mg total) by mouth once daily. 30 tablet 0    atorvastatin (LIPITOR) 80 MG tablet Take 1 tablet (80 mg total) by mouth once daily. 90 tablet 3    ibuprofen (ADVIL,MOTRIN) 600 MG tablet Take 1 tablet (600 mg total) by mouth every 6 (six) hours as needed for Pain.      metoprolol succinate (TOPROL-XL) 100 MG 24 hr tablet Take 1 tablet (100 mg total) by mouth once daily. 90 tablet 3    valsartan (DIOVAN) 80 MG tablet Take 1 tablet (80 mg total) by mouth once daily. 90 tablet 3     No facility-administered encounter medications on file as of 9/20/2024.       Allergies:  Review of patient's allergies indicates:  No Known Allergies      Physical Exam      Vitals:    09/20/24 1412   BP: 120/80   Pulse: 65   Resp: 18   Temp: 97 °F (36.1 °C)        Vital Signs  Temp: 97 °F (36.1 °C)  Temp Source: Oral  Pulse:  "65  Resp: 18  SpO2: 97 %  BP: 120/80  BP Location: Right arm  Patient Position: Sitting  Pain Score: 0-No pain  Height and Weight  Height: 5' 9" (175.3 cm)  Weight: 71.9 kg (158 lb 8.2 oz)  BSA (Calculated - sq m): 1.87 sq meters  BMI (Calculated): 23.4  Weight in (lb) to have BMI = 25: 168.9]     Body mass index is 23.41 kg/m².    Physical Exam  Constitutional:       Appearance: He is well-developed.   HENT:      Head: Normocephalic.   Eyes:      Pupils: Pupils are equal, round, and reactive to light.   Neck:      Thyroid: No thyromegaly.   Cardiovascular:      Rate and Rhythm: Normal rate and regular rhythm.      Heart sounds: No murmur heard.     No friction rub. No gallop.   Pulmonary:      Effort: Pulmonary effort is normal.      Breath sounds: Normal breath sounds.   Abdominal:      General: Bowel sounds are normal.      Palpations: Abdomen is soft.   Musculoskeletal:         General: Normal range of motion.      Cervical back: Normal range of motion.   Skin:     General: Skin is warm and dry.   Neurological:      Mental Status: He is alert and oriented to person, place, and time.      Sensory: No sensory deficit.   Psychiatric:         Behavior: Behavior normal.          Laboratory:  CBC:  No results for input(s): "WBC", "RBC", "HGB", "HCT", "PLT", "MCV", "MCH", "MCHC" in the last 2160 hours.  CMP:  No results for input(s): "GLU", "CALCIUM", "ALBUMIN", "PROT", "NA", "K", "CO2", "CL", "BUN", "ALKPHOS", "ALT", "AST", "BILITOT" in the last 2160 hours.    Invalid input(s): "CREATININ"  URINALYSIS:  No results for input(s): "COLORU", "CLARITYU", "SPECGRAV", "PHUR", "PROTEINUA", "GLUCOSEU", "BILIRUBINCON", "BLOODU", "WBCU", "RBCU", "BACTERIA", "MUCUS", "NITRITE", "LEUKOCYTESUR", "UROBILINOGEN", "HYALINECASTS" in the last 2160 hours.   LIPIDS:  Recent Labs   Lab Result Units 07/01/24  0833   HDL mg/dL 43   Cholesterol mg/dL 104*   Triglycerides mg/dL 56   LDL Cholesterol mg/dL 49.8*   HDL/Cholesterol Ratio % 41.3 " "  Non-HDL Cholesterol mg/dL 61   Total Cholesterol/HDL Ratio  2.4     TSH:  No results for input(s): "TSH" in the last 2160 hours.  A1C:  No results for input(s): "HGBA1C" in the last 2160 hours.    Radiology:        Assessment:     Gerald Wilkinson is a 74 y.o.male with:    Hypertensive heart disease without heart failure    CAD with h/o left main stenosis    Borderline hyperlipidemia    S/P CABG (coronary artery bypass graft)    Prostate cancer screening  -     PSA, Screening; Future; Expected date: 09/20/2024                Plan:     Problem List Items Addressed This Visit          Cardiac/Vascular    Hypertensive heart disease without heart failure - Primary    Overview     bp well controlled cont current          Borderline hyperlipidemia    Overview     Stable on diet only needs repeat labs next visit          CAD with h/o left main stenosis    Overview     Stable on current reigmen cont asa liptor and toprol for secondary prevention           S/P CABG (coronary artery bypass graft)    Overview     Stable             Renal/    Prostate cancer screening    Overview     Check psa             As above, continue current medications and maintain follow up with specialists.  Return to clinic in 6 months.      Frederick W Dantagnan [facility]                  "

## 2024-09-23 ENCOUNTER — TELEPHONE (OUTPATIENT)
Dept: PRIMARY CARE CLINIC | Facility: CLINIC | Age: 74
End: 2024-09-23
Payer: MEDICARE

## 2024-09-30 ENCOUNTER — PATIENT MESSAGE (OUTPATIENT)
Dept: CARDIOLOGY | Facility: CLINIC | Age: 74
End: 2024-09-30
Payer: MEDICARE

## 2024-10-04 ENCOUNTER — TELEPHONE (OUTPATIENT)
Dept: CARDIOLOGY | Facility: CLINIC | Age: 74
End: 2024-10-04
Payer: MEDICARE

## 2024-10-04 NOTE — TELEPHONE ENCOUNTER
Patient reports he has not been feeling as well since there was a change in his metoprolol from tartrate to succinate. States he sometimes he feels lightheaded and hasn't been sleeping as well. Reports his blood pressure has been normal and HR ranging from 65-70. Please advise.

## 2024-10-04 NOTE — TELEPHONE ENCOUNTER
----- Message from Ila sent at 10/4/2024  9:58 AM CDT -----  Regarding: Refill  Patient calling to a new prescription on atorvastatin (LIPITOR) 80 MG tablet @ [id number] - [place] - [address] AT [facility]   Phone: [phone]  Fax: [phone]         LOV  6/27/24    Please call back @ [phone]. Thank you Ila

## 2024-10-31 ENCOUNTER — TELEPHONE (OUTPATIENT)
Dept: CARDIOLOGY | Facility: CLINIC | Age: 74
End: 2024-10-31
Payer: MEDICARE

## 2024-11-01 ENCOUNTER — TELEPHONE (OUTPATIENT)
Dept: CARDIOLOGY | Facility: CLINIC | Age: 74
End: 2024-11-01
Payer: MEDICARE

## 2024-11-04 ENCOUNTER — OFFICE VISIT (OUTPATIENT)
Dept: CARDIOLOGY | Facility: CLINIC | Age: 74
End: 2024-11-04
Payer: MEDICARE

## 2024-11-04 VITALS
SYSTOLIC BLOOD PRESSURE: 170 MMHG | HEART RATE: 74 BPM | HEIGHT: 69 IN | BODY MASS INDEX: 23.25 KG/M2 | WEIGHT: 156.94 LBS | OXYGEN SATURATION: 100 % | DIASTOLIC BLOOD PRESSURE: 74 MMHG

## 2024-11-04 DIAGNOSIS — I48.0 PAROXYSMAL ATRIAL FIBRILLATION WITH RAPID VENTRICULAR RESPONSE: ICD-10-CM

## 2024-11-04 DIAGNOSIS — I11.9 HYPERTENSIVE HEART DISEASE WITHOUT HEART FAILURE: ICD-10-CM

## 2024-11-04 DIAGNOSIS — E78.5 BORDERLINE HYPERLIPIDEMIA: ICD-10-CM

## 2024-11-04 DIAGNOSIS — I25.10 CORONARY ARTERY DISEASE INVOLVING NATIVE CORONARY ARTERY OF NATIVE HEART WITHOUT ANGINA PECTORIS: ICD-10-CM

## 2024-11-04 DIAGNOSIS — Z98.890 S/P LEFT ATRIAL APPENDAGE LIGATION: ICD-10-CM

## 2024-11-04 DIAGNOSIS — Z95.1 S/P CABG (CORONARY ARTERY BYPASS GRAFT): ICD-10-CM

## 2024-11-04 DIAGNOSIS — M79.89 LEG SWELLING: Primary | ICD-10-CM

## 2024-11-04 PROBLEM — R07.9 CHEST PAIN: Status: RESOLVED | Noted: 2024-03-11 | Resolved: 2024-11-04

## 2024-11-04 PROBLEM — R94.39 POSITIVE CARDIAC STRESS TEST: Status: RESOLVED | Noted: 2024-03-22 | Resolved: 2024-11-04

## 2024-11-04 PROCEDURE — 3078F DIAST BP <80 MM HG: CPT | Mod: CPTII,S$GLB,, | Performed by: PHYSICIAN ASSISTANT

## 2024-11-04 PROCEDURE — 1160F RVW MEDS BY RX/DR IN RCRD: CPT | Mod: CPTII,S$GLB,, | Performed by: PHYSICIAN ASSISTANT

## 2024-11-04 PROCEDURE — 1126F AMNT PAIN NOTED NONE PRSNT: CPT | Mod: CPTII,S$GLB,, | Performed by: PHYSICIAN ASSISTANT

## 2024-11-04 PROCEDURE — 1101F PT FALLS ASSESS-DOCD LE1/YR: CPT | Mod: CPTII,S$GLB,, | Performed by: PHYSICIAN ASSISTANT

## 2024-11-04 PROCEDURE — 99214 OFFICE O/P EST MOD 30 MIN: CPT | Mod: S$GLB,,, | Performed by: PHYSICIAN ASSISTANT

## 2024-11-04 PROCEDURE — 99999 PR PBB SHADOW E&M-EST. PATIENT-LVL III: CPT | Mod: PBBFAC,,, | Performed by: PHYSICIAN ASSISTANT

## 2024-11-04 PROCEDURE — 1159F MED LIST DOCD IN RCRD: CPT | Mod: CPTII,S$GLB,, | Performed by: PHYSICIAN ASSISTANT

## 2024-11-04 PROCEDURE — 3288F FALL RISK ASSESSMENT DOCD: CPT | Mod: CPTII,S$GLB,, | Performed by: PHYSICIAN ASSISTANT

## 2024-11-04 PROCEDURE — 3077F SYST BP >= 140 MM HG: CPT | Mod: CPTII,S$GLB,, | Performed by: PHYSICIAN ASSISTANT

## 2024-11-04 NOTE — Clinical Note
Pelon Lazcano,   I am just wondering if this patient needs any monitoring to make sure he's not having occult AF, since he was in AF during prior hospitalization and was asymptomatic with it? I guess the AF could have just been from the ischemia at the time, since he has been in sinus on subsequent EKGs. He did have JHON ligation during CABG, so maybe it's not worth worrying about.   Thanks! Lucia

## 2024-11-04 NOTE — PROGRESS NOTES
"    General Cardiology Clinic Note  Reason for Visit: Leg swelling   Last Clinic Visit: 6/27/2024  General Cardiologist: Dr. Lazcano     HPI:   Gerald Wilkinson is a 74 y.o. male who presents for leg swelling.     Problems:  CAD s/p CABG x2 (LIMA-LAD, SVG-OM1)  Paroxsymal atrial fibrillation   Hypertension   Dyslipidemia    Interval HPI:  Patient presents for leg swelling. He reports minimal edema over the top of his left foot, that comes and goes, and is typically evident after eating something high in sodium. This has been occurring since his CABG when he had a vein removed. He otherwise states he has "never felt better". He stays very active and is able to do moderate-strenuous exertion without cardiac symptoms. He denies angina, [place], lightheadedness, syncope, palpitations. He checks BP every couple days at home, and it is 120-130s/50s.     ROS:      Review of Systems   Constitutional: Negative for diaphoresis, malaise/fatigue, weight gain and weight loss.   HENT:  Negative for nosebleeds.    Eyes:  Negative for vision loss in left eye, vision loss in right eye and visual disturbance.   Cardiovascular:  Positive for leg swelling. Negative for chest pain, claudication, dyspnea on exertion, irregular heartbeat, near-syncope, orthopnea, palpitations, paroxysmal nocturnal dyspnea and syncope.   Respiratory:  Negative for cough, shortness of breath, sleep disturbances due to breathing, snoring and wheezing.    Hematologic/Lymphatic: Negative for bleeding problem. Does not bruise/bleed easily.   Skin:  Negative for poor wound healing and rash.   Musculoskeletal:  Negative for muscle cramps and myalgias.   Gastrointestinal:  Negative for bloating, abdominal pain, diarrhea, heartburn, melena, nausea and vomiting.   Genitourinary:  Negative for hematuria and nocturia.   Neurological:  Negative for brief paralysis, dizziness, headaches, light-headedness, numbness and weakness.   Psychiatric/Behavioral:  Negative for depression. "    Allergic/Immunologic: Negative for hives.       PMH:   No past medical history on file.  Past Surgical History:   Procedure Laterality Date    CORONARY ANGIOGRAPHY N/A 3/22/2024    Procedure: ANGIOGRAM, CORONARY ARTERY;  Surgeon: Yumi Bal MD;  Location: [facility] CATH LAB;  Service: Cardiology;  Laterality: N/A;    CORONARY ARTERY BYPASS GRAFT (CABG) N/A 3/27/2024    Procedure: CORONARY ARTERY BYPASS GRAFT (CABG);  Surgeon: Tony Canas MD;  Location: [facility] OR [address] [place] [address];  Service: Cardiovascular;  Laterality: N/A;  left atrial appendage resection    ENDOSCOPIC HARVEST OF VEIN Left 3/27/2024    Procedure: HARVEST-VEIN-ENDOVASCULAR;  Surgeon: Tony Canas MD;  Location: [facility] OR [address] [place] [address];  Service: Cardiovascular;  Laterality: Left;  VEIN HARVEST START 0917  VEIN OUT AND PREP START 0945  VEIN READY 1009    LEFT HEART CATHETERIZATION Left 3/22/2024    Procedure: Left heart cath;  Surgeon: Yumi Bal MD;  Location: [facility] CATH LAB;  Service: Cardiology;  Laterality: Left;    REPAIR, HERNIA, INGUINAL, WITHOUT HISTORY OF PRIOR REPAIR, AGE 5 YEARS OR OLDER Right 7/23/2024    Procedure: REPAIR, HERNIA, INGUINAL, WITHOUT HISTORY OF PRIOR REPAIR, AGE 5 YEARS OR OLDER, Right with mesh;  Surgeon: Andrei Marquez MD;  Location: [address];  Service: General;  Laterality: Right;     Allergies:   Review of patient's allergies indicates:  No Known Allergies  Medications:     Current Outpatient Medications on File Prior to Visit   Medication Sig Dispense Refill    acetaminophen (TYLENOL) 650 MG TbSR Take 1 tablet (650 mg total) by mouth every 8 (eight) hours.      aspirin (ECOTRIN) 81 MG EC tablet Take 1 tablet (81 mg total) by mouth once daily. 30 tablet 0    atorvastatin (LIPITOR) 80 MG tablet Take 1 tablet (80 mg total) by mouth once daily. 90 tablet 3    ibuprofen (ADVIL,MOTRIN) 600 MG tablet Take 1 tablet (600 mg total) by mouth every 6 (six) hours as needed for Pain.      metoprolol succinate (TOPROL-XL) 100 MG  "24 hr tablet Take 1 tablet (100 mg total) by mouth once daily. 90 tablet 3    valsartan (DIOVAN) 80 MG tablet Take 1 tablet (80 mg total) by mouth once daily. 90 tablet 3     No current facility-administered medications on file prior to visit.     Social History:     Social History     Tobacco Use    Smoking status: Never    Smokeless tobacco: Never   Substance Use Topics    Alcohol use: No     Family History:     Family History   Problem Relation Name Age of Onset    Hypertension Mother      Cancer Mother      Hypertension Sister      Cancer Sister       Physical Exam:   BP (!) 170/74 (Patient Position: Sitting)   Pulse 74   Ht 5' 9" (1.753 m)   Wt 71.2 kg (156 lb 15.5 oz)   SpO2 100%   BMI 23.18 kg/m²        Physical Exam  Vitals and nursing note reviewed.   Constitutional:       General: He is not in acute distress.     Appearance: Normal appearance.   HENT:      Head: Normocephalic and atraumatic.      Nose: Nose normal.   Eyes:      General: No scleral icterus.     Extraocular Movements: Extraocular movements intact.      Conjunctiva/sclera: Conjunctivae normal.   Neck:      Thyroid: No thyromegaly.      Vascular: No carotid bruit or JVD.   Cardiovascular:      Rate and Rhythm: Normal rate and regular rhythm.      Pulses: Normal pulses.      Heart sounds: Normal heart sounds. No murmur heard.     No friction rub. No gallop.   Pulmonary:      Effort: Pulmonary effort is normal.      Breath sounds: Normal breath sounds. No wheezing, rhonchi or rales.   Chest:      Chest wall: No tenderness.   Abdominal:      General: Bowel sounds are normal. There is no distension.      Palpations: Abdomen is soft.      Tenderness: There is no abdominal tenderness.   Musculoskeletal:      Cervical back: Neck supple.      Right lower leg: No edema.      Left lower leg: No edema.   Skin:     General: Skin is warm and dry.      Coloration: Skin is not pale.      Findings: No erythema or rash.      Nails: There is no clubbing. " "  Neurological:      Mental Status: He is alert and oriented to person, place, and time.   Psychiatric:         Mood and Affect: Mood and affect normal.         Behavior: Behavior normal.          Labs:     Lab Results   Component Value Date     03/31/2024    K 3.6 03/31/2024     03/31/2024    CO2 25 03/31/2024    BUN 31 (H) 03/31/2024    CREATININE 0.9 03/31/2024    ANIONGAP 10 03/31/2024     No results found for: "HGBA1C"  Lab Results   Component Value Date    BNP 15 03/22/2024    Lab Results   Component Value Date    WBC 8.53 03/31/2024    HGB 9.2 (L) 03/31/2024    HCT 28.3 (L) 03/31/2024    HCT 26 (L) 03/27/2024     03/31/2024    GRAN 6.4 03/31/2024    GRAN 74.3 (H) 03/31/2024     Lab Results   Component Value Date    CHOL 104 (L) 07/01/2024    HDL 43 07/01/2024    LDLCALC 49.8 (L) 07/01/2024    TRIG 56 07/01/2024          Imaging:   Echocardiograms:   TTE 3/25/2024    Left Ventricle: The left ventricle is normal in size. Normal wall thickness. There is concentric remodeling. Normal wall motion. There is normal systolic function with a visually estimated ejection fraction of 60 - 65%. There is normal diastolic function.    Right Ventricle: Mild right ventricular enlargement. Wall thickness is normal. Right ventricle wall motion  is normal. Systolic function is normal.    Pulmonary Artery: The estimated pulmonary artery systolic pressure is 26 mmHg.    IVC/SVC: Normal venous pressure at 3 mmHg.    Stress Tests:   Exercise EKG 3/22/24       The ECG portion of the study is positive for ischemia.    The patient reported chest pain during the stress test.    There were no arrhythmias during stress.    Caths:   [facility] 3/22/2024  Critical LM stenosis with multiple targets in a left dominant system.  Case discussed with CTS who plans for CABG.  Admission to CCU.     Other:  CABG 3/27/2024  1.  Urgent Coronary artery bypass grafting x 2 (LIMA-LAD and SVG to OM1).  2. Resection of left atrial appendage  3. "  Takedown of the left internal mammary artery, skeletonized technique.  4. Endoscopic vein harvest from Left Lower Extremity      EKG 4/22/2024: NSR, RBBB    Assessment:     1. Leg swelling    2. CAD with h/o left main stenosis    3. S/P CABG (coronary artery bypass graft)    4. Paroxysmal atrial fibrillation with rapid ventricular response    5. Hypertensive heart disease without heart failure    6. Borderline hyperlipidemia    7. S/P left atrial appendage ligation      Plan:     Leg swelling  He reports very mild L foot edema after eating something salty. Discussed this is likely a consequence of the vein removal during CABG. Otherwise no signs/symptoms of CHF. Reassurance provided. Recommend low sodium diet.     CAD s/p CABG  Denies angina   Continue asa and statin    Paroxysmal atrial fibrillation  S/p JHON ligation  Asymptomatic. He was in AF when hospitalized in March for abnl stress test/UA, but spontaneously converted to NSR prior to CABG. He is not on any anticoagulation. He had JHON ligation during surgery, although has not had MARIA C to confirm complete resection.     Hypertension  White coat component. He reports consistently well controlled blood pressure at home.   Continue Metoprolol succinate 50 mg daily   Continue Valsartan 80 mg daily     Hyperlipidemia  LDL at goal on Lipitor 80 mg.     Follow up in 6 months.     Signed:  Anitha Reza PA-C  Cardiology   [phone] - General

## 2024-11-05 ENCOUNTER — PATIENT MESSAGE (OUTPATIENT)
Dept: CARDIOLOGY | Facility: CLINIC | Age: 74
End: 2024-11-05
Payer: MEDICARE

## 2024-11-13 ENCOUNTER — PATIENT MESSAGE (OUTPATIENT)
Dept: CARDIOLOGY | Facility: CLINIC | Age: 74
End: 2024-11-13
Payer: MEDICARE

## 2025-03-13 ENCOUNTER — PATIENT MESSAGE (OUTPATIENT)
Dept: CARDIOLOGY | Facility: CLINIC | Age: 75
End: 2025-03-13
Payer: MEDICARE

## 2025-03-14 ENCOUNTER — PATIENT MESSAGE (OUTPATIENT)
Dept: CARDIOLOGY | Facility: CLINIC | Age: 75
End: 2025-03-14
Payer: MEDICARE

## 2025-03-14 DIAGNOSIS — I25.10 CORONARY ARTERY DISEASE INVOLVING NATIVE CORONARY ARTERY OF NATIVE HEART WITHOUT ANGINA PECTORIS: Primary | ICD-10-CM

## 2025-03-14 DIAGNOSIS — I48.0 PAROXYSMAL ATRIAL FIBRILLATION WITH RAPID VENTRICULAR RESPONSE: ICD-10-CM

## 2025-03-28 ENCOUNTER — OFFICE VISIT (OUTPATIENT)
Dept: PRIMARY CARE CLINIC | Facility: CLINIC | Age: 75
End: 2025-03-28
Payer: MEDICARE

## 2025-03-28 VITALS
WEIGHT: 161.63 LBS | HEART RATE: 60 BPM | SYSTOLIC BLOOD PRESSURE: 115 MMHG | DIASTOLIC BLOOD PRESSURE: 70 MMHG | RESPIRATION RATE: 18 BRPM | OXYGEN SATURATION: 99 % | BODY MASS INDEX: 23.94 KG/M2 | HEIGHT: 69 IN

## 2025-03-28 DIAGNOSIS — I11.9 HYPERTENSIVE HEART DISEASE WITHOUT HEART FAILURE: Primary | ICD-10-CM

## 2025-03-28 DIAGNOSIS — I48.0 PAROXYSMAL ATRIAL FIBRILLATION WITH RAPID VENTRICULAR RESPONSE: ICD-10-CM

## 2025-03-28 DIAGNOSIS — I25.10 CORONARY ARTERY DISEASE INVOLVING NATIVE CORONARY ARTERY OF NATIVE HEART WITHOUT ANGINA PECTORIS: ICD-10-CM

## 2025-03-28 DIAGNOSIS — E78.5 BORDERLINE HYPERLIPIDEMIA: ICD-10-CM

## 2025-03-28 DIAGNOSIS — F51.01 PRIMARY INSOMNIA: ICD-10-CM

## 2025-03-28 PROCEDURE — 99999 PR PBB SHADOW E&M-EST. PATIENT-LVL III: CPT | Mod: PBBFAC,,, | Performed by: INTERNAL MEDICINE

## 2025-03-28 NOTE — PROGRESS NOTES
[facility] Note    Chief Complaint      Chief Complaint   Patient presents with    Follow-up       History of Present Illness      Gerald Wilkinson is a 74 y.o. male who presents today for   Chief Complaint   Patient presents with    Follow-up   .  Patient comes to appointment here for checkup for chronic issues as below . He will be seeing dr musa cardiology . He is currently compliant with all meds and diet . He has stopped his statin due to constipation . I have encouraged him to resume .     HPI    No problem-specific Assessment & Plan notes found for this encounter.       Problem List Items Addressed This Visit       Hypertensive heart disease without heart failure - Primary    Overview   bp well controlled cont current          Primary insomnia    Overview   ambien prn continues to work well          Borderline hyperlipidemia    Overview   Now on statin          Paroxysmal atrial fibrillation with rapid ventricular response    Overview   Sinus currently cont per dr musa         CAD with h/o left main stenosis    Overview   Stable on current reigmen cont asa liptor and toprol for secondary prevention               Past Medical History:  History reviewed. No pertinent past medical history.    Past Surgical History:  Past Surgical History:   Procedure Laterality Date    CORONARY ANGIOGRAPHY N/A 3/22/2024    Procedure: ANGIOGRAM, CORONARY ARTERY;  Surgeon: Yumi Bal MD;  Location: [facility] CATH LAB;  Service: Cardiology;  Laterality: N/A;    CORONARY ARTERY BYPASS GRAFT (CABG) N/A 3/27/2024    Procedure: CORONARY ARTERY BYPASS GRAFT (CABG);  Surgeon: Tony Canas MD;  Location: [facility] OR [address] [place] [address];  Service: Cardiovascular;  Laterality: N/A;  left atrial appendage resection    ENDOSCOPIC HARVEST OF VEIN Left 3/27/2024    Procedure: HARVEST-VEIN-ENDOVASCULAR;  Surgeon: Tony Canas MD;  Location: [facility] OR [address] [place] [address];  Service: Cardiovascular;  Laterality: Left;  VEIN HARVEST START 0917  VEIN  "OUT AND PREP START 0945  VEIN READY 1009    LEFT HEART CATHETERIZATION Left 3/22/2024    Procedure: Left heart cath;  Surgeon: Yumi Bal MD;  Location: [facility] CATH LAB;  Service: Cardiology;  Laterality: Left;    REPAIR, HERNIA, INGUINAL, WITHOUT HISTORY OF PRIOR REPAIR, AGE 5 YEARS OR OLDER Right 7/23/2024    Procedure: REPAIR, HERNIA, INGUINAL, WITHOUT HISTORY OF PRIOR REPAIR, AGE 5 YEARS OR OLDER, Right with mesh;  Surgeon: Andrei Marquez MD;  Location: [facility] OR [address] [place] [address];  Service: General;  Laterality: Right;       Family History:  family history includes Cancer in his mother and sister; Hypertension in his mother and sister.     Social History:  Social History[1]    Review of Systems:   Review of Systems   Constitutional:  Negative for fever and weight loss.   HENT:  Negative for congestion, hearing loss and sore throat.    Eyes:  Negative for blurred vision.   Respiratory:  Negative for cough and shortness of breath.    Cardiovascular:  Negative for chest pain, palpitations, claudication and leg swelling.   Gastrointestinal:  Positive for constipation. Negative for abdominal pain, diarrhea and heartburn.   Genitourinary:  Negative for dysuria.   Musculoskeletal:  Negative for back pain and myalgias.   Skin:  Negative for rash.   Neurological:  Negative for focal weakness and headaches.   Psychiatric/Behavioral:  Negative for depression and suicidal ideas. The patient is not nervous/anxious.         Medications:  Encounter Medications[2]    Allergies:  Review of patient's allergies indicates:  No Known Allergies      Physical Exam      Vitals:    03/28/25 1441   BP: 115/70   Pulse:    Resp:         Vital Signs  Pulse: 60  Resp: 18  SpO2: 99 %  BP: 115/70 (bp home reading)  BP Location: Right arm  Patient Position: Sitting  Pain Score: 0-No pain  Height and Weight  Height: 5' 9" (175.3 cm)  Weight: 73.3 kg (161 lb 9.6 oz)  BSA (Calculated - sq m): 1.89 sq meters  BMI (Calculated): 23.9  Weight in (lb) to " "have BMI = 25: 168.9]     Body mass index is 23.86 kg/m².    Physical Exam  Constitutional:       Appearance: He is well-developed.   HENT:      Head: Normocephalic.   Eyes:      Pupils: Pupils are equal, round, and reactive to light.   Neck:      Thyroid: No thyromegaly.   Cardiovascular:      Rate and Rhythm: Normal rate and regular rhythm.      Heart sounds: No murmur heard.     No friction rub. No gallop.   Pulmonary:      Effort: Pulmonary effort is normal.      Breath sounds: Normal breath sounds.   Abdominal:      General: Bowel sounds are normal.      Palpations: Abdomen is soft.   Musculoskeletal:         General: Normal range of motion.      Cervical back: Normal range of motion.   Skin:     General: Skin is warm and dry.   Neurological:      Mental Status: He is alert and oriented to person, place, and time.      Sensory: No sensory deficit.   Psychiatric:         Behavior: Behavior normal.          Laboratory:  CBC:  No results for input(s): "WBC", "RBC", "HGB", "HCT", "PLT", "MCV", "MCH", "MCHC" in the last 2160 hours.  CMP:  No results for input(s): "GLU", "CALCIUM", "ALBUMIN", "PROT", "NA", "K", "CO2", "CL", "BUN", "ALKPHOS", "ALT", "AST", "BILITOT" in the last 2160 hours.    Invalid input(s): "CREATININ"  URINALYSIS:  No results for input(s): "COLORU", "CLARITYU", "SPECGRAV", "PHUR", "PROTEINUA", "GLUCOSEU", "BILIRUBINCON", "BLOODU", "WBCU", "RBCU", "BACTERIA", "MUCUS", "NITRITE", "LEUKOCYTESUR", "UROBILINOGEN", "HYALINECASTS" in the last 2160 hours.   LIPIDS:  No results for input(s): "TSH", "HDL", "CHOL", "TRIG", "LDLCALC", "CHOLHDL", "NONHDLCHOL", "TOTALCHOLEST" in the last 2160 hours.  TSH:  No results for input(s): "TSH" in the last 2160 hours.  A1C:  No results for input(s): "HGBA1C" in the last 2160 hours.    Radiology:        Assessment:     Gearld Wilkinson is a 74 y.o.male with:    Hypertensive heart disease without heart failure    Primary insomnia    CAD with h/o left main " stenosis    Borderline hyperlipidemia    Paroxysmal atrial fibrillation with rapid ventricular response                Plan:     Problem List Items Addressed This Visit       Hypertensive heart disease without heart failure - Primary    Overview   bp well controlled cont current          Primary insomnia    Overview   ambien prn continues to work well          Borderline hyperlipidemia    Overview   Now on statin          Paroxysmal atrial fibrillation with rapid ventricular response    Overview   Sinus currently cont per dr musa         CAD with h/o left main stenosis    Overview   Stable on current reigmen cont asa liptor and toprol for secondary prevention              As above, continue current medications and maintain follow up with specialists.  Return to clinic in 6 months.      [facility]erick W Dantagnan [facility]                       [1]   Social History  Socioeconomic History    Marital status:    Tobacco Use    Smoking status: Never    Smokeless tobacco: Never   Substance and Sexual Activity    Alcohol use: No    Drug use: Yes    Sexual activity: Yes     Partners: Female     Social Drivers of Health     Financial Resource Strain: Low Risk  (3/23/2024)    Overall Financial Resource Strain (CARDIA)     Difficulty of Paying Living Expenses: Not hard at all   Food Insecurity: No Food Insecurity (3/23/2024)    Hunger Vital Sign     Worried About Running Out of Food in the Last Year: Never true     Ran Out of Food in the Last Year: Never true   Transportation Needs: No Transportation Needs (3/23/2024)    PRAPARE - Transportation     Lack of Transportation (Medical): No     Lack of Transportation (Non-Medical): No   Physical Activity: Sufficiently Active (8/17/2020)    Exercise Vital Sign     Days of Exercise per Week: 7 days     Minutes of Exercise per Session: 60 min   Stress: No Stress Concern Present (3/23/2024)    [employer] -  Occupational Stress Questionnaire     Feeling of Stress : Not at all   Housing Stability: Low Risk  (3/23/2024)    Housing Stability Vital Sign     Unable to Pay for Housing in the Last Year: No     Number of Places Lived in the Last Year: 1     Unstable Housing in the Last Year: No   [2]   Outpatient Encounter Medications as of 3/28/2025   Medication Sig Dispense Refill    acetaminophen (TYLENOL) 650 MG TbSR Take 1 tablet (650 mg total) by mouth every 8 (eight) hours.      aspirin (ECOTRIN) 81 MG EC tablet Take 1 tablet (81 mg total) by mouth once daily. 30 tablet 0    atorvastatin (LIPITOR) 80 MG tablet Take 1 tablet (80 mg total) by mouth once daily. 90 tablet 3    ibuprofen (ADVIL,MOTRIN) 600 MG tablet Take 1 tablet (600 mg total) by mouth every 6 (six) hours as needed for Pain.      metoprolol succinate (TOPROL-XL) 100 MG 24 hr tablet Take 1 tablet (100 mg total) by mouth once daily. 90 tablet 3    valsartan (DIOVAN) 80 MG tablet Take 1 tablet (80 mg total) by mouth once daily. 90 tablet 3     No facility-administered encounter medications on file as of 3/28/2025.

## 2025-04-04 ENCOUNTER — LAB VISIT (OUTPATIENT)
Dept: LAB | Facility: HOSPITAL | Age: 75
End: 2025-04-04
Attending: INTERNAL MEDICINE
Payer: MEDICARE

## 2025-04-04 DIAGNOSIS — I25.10 CORONARY ARTERY DISEASE INVOLVING NATIVE CORONARY ARTERY OF NATIVE HEART WITHOUT ANGINA PECTORIS: ICD-10-CM

## 2025-04-04 DIAGNOSIS — I48.0 PAROXYSMAL ATRIAL FIBRILLATION WITH RAPID VENTRICULAR RESPONSE: ICD-10-CM

## 2025-04-04 LAB
ABSOLUTE EOSINOPHIL (OHS): 0.31 K/UL
ABSOLUTE MONOCYTE (OHS): 0.66 K/UL (ref 0.3–1)
ABSOLUTE NEUTROPHIL COUNT (OHS): 3.7 K/UL (ref 1.8–7.7)
ALBUMIN SERPL BCP-MCNC: 4 G/DL (ref 3.5–5.2)
ALP SERPL-CCNC: 44 UNIT/L (ref 40–150)
ALT SERPL W/O P-5'-P-CCNC: 20 UNIT/L (ref 10–44)
ANION GAP (OHS): 7 MMOL/L (ref 8–16)
AST SERPL-CCNC: 24 UNIT/L (ref 11–45)
BASOPHILS # BLD AUTO: 0.04 K/UL
BASOPHILS NFR BLD AUTO: 0.6 %
BILIRUB SERPL-MCNC: 1.2 MG/DL (ref 0.1–1)
BUN SERPL-MCNC: 17 MG/DL (ref 8–23)
CALCIUM SERPL-MCNC: 9.2 MG/DL (ref 8.7–10.5)
CHLORIDE SERPL-SCNC: 105 MMOL/L (ref 95–110)
CHOLEST SERPL-MCNC: 112 MG/DL (ref 120–199)
CHOLEST/HDLC SERPL: 2.9 {RATIO} (ref 2–5)
CO2 SERPL-SCNC: 25 MMOL/L (ref 23–29)
CREAT SERPL-MCNC: 0.8 MG/DL (ref 0.5–1.4)
ERYTHROCYTE [DISTWIDTH] IN BLOOD BY AUTOMATED COUNT: 12.3 % (ref 11.5–14.5)
GFR SERPLBLD CREATININE-BSD FMLA CKD-EPI: >60 ML/MIN/1.73/M2
GLUCOSE SERPL-MCNC: 79 MG/DL (ref 70–110)
HCT VFR BLD AUTO: 48.1 % (ref 40–54)
HDLC SERPL-MCNC: 39 MG/DL (ref 40–75)
HDLC SERPL: 34.8 % (ref 20–50)
HGB BLD-MCNC: 15.9 GM/DL (ref 14–18)
IMM GRANULOCYTES # BLD AUTO: 0.02 K/UL (ref 0–0.04)
IMM GRANULOCYTES NFR BLD AUTO: 0.3 % (ref 0–0.5)
LDLC SERPL CALC-MCNC: 62.8 MG/DL (ref 63–159)
LYMPHOCYTES # BLD AUTO: 1.63 K/UL (ref 1–4.8)
MCH RBC QN AUTO: 30.4 PG (ref 27–31)
MCHC RBC AUTO-ENTMCNC: 33.1 G/DL (ref 32–36)
MCV RBC AUTO: 92 FL (ref 82–98)
NONHDLC SERPL-MCNC: 73 MG/DL
NUCLEATED RBC (/100WBC) (OHS): 0 /100 WBC
PLATELET # BLD AUTO: 223 K/UL (ref 150–450)
PMV BLD AUTO: 11.3 FL (ref 9.2–12.9)
POTASSIUM SERPL-SCNC: 4.2 MMOL/L (ref 3.5–5.1)
PROT SERPL-MCNC: 7.3 GM/DL (ref 6–8.4)
RBC # BLD AUTO: 5.23 M/UL (ref 4.6–6.2)
RELATIVE EOSINOPHIL (OHS): 4.9 %
RELATIVE LYMPHOCYTE (OHS): 25.6 % (ref 18–48)
RELATIVE MONOCYTE (OHS): 10.4 % (ref 4–15)
RELATIVE NEUTROPHIL (OHS): 58.2 % (ref 38–73)
SODIUM SERPL-SCNC: 137 MMOL/L (ref 136–145)
TRIGL SERPL-MCNC: 51 MG/DL (ref 30–150)
WBC # BLD AUTO: 6.36 K/UL (ref 3.9–12.7)

## 2025-04-04 PROCEDURE — 80053 COMPREHEN METABOLIC PANEL: CPT

## 2025-04-04 PROCEDURE — 36415 COLL VENOUS BLD VENIPUNCTURE: CPT

## 2025-04-04 PROCEDURE — 80061 LIPID PANEL: CPT

## 2025-04-04 PROCEDURE — 85025 COMPLETE CBC W/AUTO DIFF WBC: CPT

## 2025-04-09 ENCOUNTER — OFFICE VISIT (OUTPATIENT)
Dept: CARDIOLOGY | Facility: CLINIC | Age: 75
End: 2025-04-09
Payer: MEDICARE

## 2025-04-09 VITALS
HEIGHT: 69 IN | SYSTOLIC BLOOD PRESSURE: 169 MMHG | BODY MASS INDEX: 24 KG/M2 | OXYGEN SATURATION: 100 % | WEIGHT: 162.06 LBS | HEART RATE: 77 BPM | DIASTOLIC BLOOD PRESSURE: 74 MMHG

## 2025-04-09 DIAGNOSIS — E78.5 BORDERLINE HYPERLIPIDEMIA: ICD-10-CM

## 2025-04-09 DIAGNOSIS — I25.10 CORONARY ARTERY DISEASE INVOLVING NATIVE CORONARY ARTERY OF NATIVE HEART WITHOUT ANGINA PECTORIS: Primary | ICD-10-CM

## 2025-04-09 DIAGNOSIS — I48.0 PAROXYSMAL ATRIAL FIBRILLATION WITH RAPID VENTRICULAR RESPONSE: ICD-10-CM

## 2025-04-09 DIAGNOSIS — Z98.890 S/P LEFT ATRIAL APPENDAGE LIGATION: ICD-10-CM

## 2025-04-09 DIAGNOSIS — I11.9 HYPERTENSIVE HEART DISEASE WITHOUT HEART FAILURE: ICD-10-CM

## 2025-04-09 DIAGNOSIS — Z95.1 S/P CABG (CORONARY ARTERY BYPASS GRAFT): ICD-10-CM

## 2025-04-09 PROCEDURE — 99999 PR PBB SHADOW E&M-EST. PATIENT-LVL III: CPT | Mod: PBBFAC,,, | Performed by: INTERNAL MEDICINE

## 2025-04-09 PROCEDURE — 3077F SYST BP >= 140 MM HG: CPT | Mod: CPTII,S$GLB,, | Performed by: INTERNAL MEDICINE

## 2025-04-09 PROCEDURE — 1160F RVW MEDS BY RX/DR IN RCRD: CPT | Mod: CPTII,S$GLB,, | Performed by: INTERNAL MEDICINE

## 2025-04-09 PROCEDURE — 3288F FALL RISK ASSESSMENT DOCD: CPT | Mod: CPTII,S$GLB,, | Performed by: INTERNAL MEDICINE

## 2025-04-09 PROCEDURE — 99214 OFFICE O/P EST MOD 30 MIN: CPT | Mod: S$GLB,,, | Performed by: INTERNAL MEDICINE

## 2025-04-09 PROCEDURE — 1159F MED LIST DOCD IN RCRD: CPT | Mod: CPTII,S$GLB,, | Performed by: INTERNAL MEDICINE

## 2025-04-09 PROCEDURE — 1101F PT FALLS ASSESS-DOCD LE1/YR: CPT | Mod: CPTII,S$GLB,, | Performed by: INTERNAL MEDICINE

## 2025-04-09 PROCEDURE — 1126F AMNT PAIN NOTED NONE PRSNT: CPT | Mod: CPTII,S$GLB,, | Performed by: INTERNAL MEDICINE

## 2025-04-09 PROCEDURE — 3078F DIAST BP <80 MM HG: CPT | Mod: CPTII,S$GLB,, | Performed by: INTERNAL MEDICINE

## 2025-04-09 NOTE — PROGRESS NOTES
"Subjective:   Patient ID:  Gerald Wilkinson is a 74 y.o. male who presents for follow up of Follow-up and Coronary Artery Disease      HPI: Yearly f/u for CAD s/p CABG x 2.  He is doing well with no new symptoms or cardiovascular complaints and no change in exercise capacity.  He denies chest discomfort, [place] TOR[place], palpitations, PND/orthopnea, lightheadedness and syncope.    Playing lots of golf with no difficulties.  "I feel better than I have in forever."       BPs are 120-135/65 consistently at home.  He's always had white coat hypertension.    He dialed back atorvastatin to 40 because of constipation and that has helped.      June 2024 HPI: Very pleasant man who was having exertional chest discomfort in the first few months of this year, had a markedly abnormal treadmill, was found the same day to have severe LM disease, and had a 2 vessel CABG in March.     He has done well since and denies chest discomfort, [place] T[place], palpitations, PND/orthopnea, lightheadedness and syncope.     BPs at home have been 120s systolic at home     Exercise: Two 18-20 minute miles per day plus isometric exercise.    Problem List[1]    Current Outpatient Medications   Medication Sig    acetaminophen (TYLENOL) 650 MG TbSR Take 1 tablet (650 mg total) by mouth every 8 (eight) hours.    atorvastatin (LIPITOR) 80 MG tablet Take 1 tablet (80 mg total) by mouth once daily.    ibuprofen (ADVIL,MOTRIN) 600 MG tablet Take 1 tablet (600 mg total) by mouth every 6 (six) hours as needed for Pain.    metoprolol succinate (TOPROL-XL) 100 MG 24 hr tablet Take 1 tablet (100 mg total) by mouth once daily.    valsartan (DIOVAN) 80 MG tablet Take 1 tablet (80 mg total) by mouth once daily.    aspirin (ECOTRIN) 81 MG EC tablet Take 1 tablet (81 mg total) by mouth once daily.     No current facility-administered medications for this visit.       ROS  The review of systems is negative except as above.     Objective:   Physical Exam  Vitals reviewed.   Constitutional:  "      Appearance: He is well-developed.   HENT:      Head: Normocephalic and atraumatic.   Eyes:      General: No scleral icterus.     Conjunctiva/sclera: Conjunctivae normal.   Neck:      Vascular: No JVD.   Cardiovascular:      Rate and Rhythm: Normal rate and regular rhythm.      Pulses: Intact distal pulses.      Heart sounds: Normal heart sounds. No murmur heard.     No friction rub. No gallop.   Pulmonary:      Effort: Pulmonary effort is normal.      Breath sounds: Normal breath sounds. No wheezing or rales.   Abdominal:      General: Bowel sounds are normal. There is no distension.      Palpations: Abdomen is soft.      Tenderness: There is no abdominal tenderness.   Musculoskeletal:         General: Normal range of motion.      Cervical back: Normal range of motion and neck supple.   Skin:     General: Skin is warm and dry.      Findings: No erythema or rash.   Neurological:      Mental Status: He is alert and oriented to person, place, and time.   Psychiatric:         Behavior: Behavior normal.         Thought Content: Thought content normal.         Judgment: Judgment normal.         Lab Results   Component Value Date    WBC 6.36 04/04/2025    HGB 15.9 04/04/2025    HCT 48.1 04/04/2025    MCV 92 04/04/2025     04/04/2025         Chemistry        Component Value Date/Time     04/04/2025 0833     03/31/2024 0607    K 4.2 04/04/2025 0833    K 3.6 03/31/2024 0607     04/04/2025 0833     03/31/2024 0607    CO2 25 04/04/2025 0833    CO2 25 03/31/2024 0607    BUN 17 04/04/2025 0833    CREATININE 0.8 04/04/2025 0833    GLU 95 03/31/2024 0607        Component Value Date/Time    CALCIUM 9.2 04/04/2025 0833    CALCIUM 8.6 (L) 03/31/2024 0607    ALKPHOS 44 04/04/2025 0833    ALKPHOS 40 (L) 03/31/2024 0607    AST 24 04/04/2025 0833    AST 22 03/31/2024 0607    ALT 20 04/04/2025 0833    ALT 21 03/31/2024 0607    BILITOT 1.2 (H) 04/04/2025 0833    BILITOT 0.7 03/31/2024 0607    ESTGFRAFRICA  ">60.0 04/21/2022 0856    EGFRNONAA >60.0 04/21/2022 0856            Lab Results   Component Value Date    CHOL 112 (L) 04/04/2025    CHOL 104 (L) 07/01/2024    CHOL 169 04/25/2023     Lab Results   Component Value Date    HDL 39 (L) 04/04/2025    HDL 43 07/01/2024    HDL 48 04/25/2023     Last LDL 62    Lab Results   Component Value Date    LDLCALC 49.8 (L) 07/01/2024    LDLCALC 112.4 04/25/2023    LDLCALC 136.2 04/21/2022     Lab Results   Component Value Date    TRIG 51 04/04/2025    TRIG 56 07/01/2024    TRIG 43 04/25/2023     Lab Results   Component Value Date    CHOLHDL 34.8 04/04/2025    CHOLHDL 41.3 07/01/2024    CHOLHDL 28.4 04/25/2023       Lab Results   Component Value Date    TSH 1.192 09/29/2020       No results found for: "HGBA1C"    Assessment:     1. CAD with h/o left main stenosis    2. Borderline hyperlipidemia    3. Paroxysmal atrial fibrillation with rapid ventricular response    4. Hypertensive heart disease without heart failure    5. S/P CABG (coronary artery bypass graft)    6. S/P left atrial appendage ligation        Plan:     Continue current medicines.    Diet/exercise goals reinforced.    F/U 12 months                 [1]   Patient Active Problem List  Diagnosis    Hypertensive heart disease without heart failure    Gastroesophageal reflux disease without esophagitis    Primary insomnia    Erectile dysfunction    Loose stools    Borderline hyperlipidemia    Prostate cancer screening    Encounter for hepatitis C screening test for low risk patient    Chronic right shoulder pain    Paroxysmal atrial fibrillation with rapid ventricular response    CAD with h/o left main stenosis    Stress hyperglycemia    S/P CABG (coronary artery bypass graft)    S/P left atrial appendage ligation     "

## 2025-05-07 NOTE — PROGRESS NOTES
Faxed to [facility] at [phone] and [phone]   Subjective:   Patient ID: Gerald Wilkinson is a 66 y.o. male.    Chief Complaint: Abdominal Pain (prostatitis)      HPI  67 yo male with dx of prostatitis and has been taking trimethoprim daily for about 8 days and is still symptomatic. Having pelvic pain. No fevers or chills. No nausea or vomiting. Pt is an excellent historian.     Patient queried and denies any further complaints      PAST MEDICAL HISTORY:  History reviewed. No pertinent past medical history.    PAST SURGICAL HISTORY:  History reviewed. No pertinent surgical history.    SOCIAL HISTORY:  Social History     Social History    Marital status:      Spouse name: N/A    Number of children: N/A    Years of education: N/A     Occupational History    Not on file.     Social History Main Topics    Smoking status: Never Smoker    Smokeless tobacco: Not on file    Alcohol use No    Drug use: Not on file    Sexual activity: Not on file     Other Topics Concern    Not on file     Social History Narrative    No narrative on file       FAMILY HISTORY:  History reviewed. No pertinent family history.    ALLERGIES AND MEDICATIONS: updated and reviewed.  Review of patient's allergies indicates:  No Known Allergies    Current Outpatient Prescriptions:     multivitamin capsule, Take 1 capsule by mouth once daily., Disp: , Rfl:     valsartan (DIOVAN) 80 MG tablet, Take 80 mg by mouth once daily., Disp: , Rfl:     ciprofloxacin HCl (CIPRO) 500 MG tablet, Take 1 tablet (500 mg total) by mouth 2 (two) times daily., Disp: 20 tablet, Rfl: 0    tramadol (ULTRAM) 50 mg tablet, Take 1 tablet (50 mg total) by mouth every 6 (six) hours as needed for Pain., Disp: 30 tablet, Rfl: 0  No current facility-administered medications for this visit.     Review of Systems   Constitutional: Negative for activity change, appetite change, chills, diaphoresis, fatigue, fever and unexpected weight change.   HENT: Negative for congestion, ear discharge, ear pain, facial  swelling, hearing loss, nosebleeds, postnasal drip, rhinorrhea, sinus pressure, sneezing, sore throat, tinnitus, trouble swallowing and voice change.    Eyes: Negative for photophobia, pain, discharge, redness, itching and visual disturbance.   Respiratory: Negative for cough, chest tightness, shortness of breath and wheezing.    Cardiovascular: Negative for chest pain, palpitations and leg swelling.   Gastrointestinal: Positive for abdominal distention. Negative for abdominal pain, anal bleeding, blood in stool, constipation, diarrhea, nausea, rectal pain and vomiting.   Endocrine: Negative for cold intolerance, heat intolerance, polydipsia, polyphagia and polyuria.   Genitourinary: Negative for decreased urine volume, difficulty urinating, discharge, dysuria, enuresis, flank pain, frequency, genital sores, hematuria, penile pain, penile swelling, scrotal swelling, testicular pain and urgency.   Musculoskeletal: Negative for arthralgias, back pain, joint swelling, myalgias and neck pain.   Skin: Negative for rash.   Neurological: Negative for dizziness, tremors, seizures, syncope, speech difficulty, weakness, light-headedness, numbness and headaches.   Psychiatric/Behavioral: Negative for behavioral problems, confusion, decreased concentration, dysphoric mood, sleep disturbance and suicidal ideas. The patient is not nervous/anxious and is not hyperactive.        Objective:     Vitals:    02/24/17 1505   BP: (!) 172/78   Pulse: 73   Resp: 16   Temp: 98.4 °F (36.9 °C)   TempSrc: Oral   Weight: 75.2 kg (165 lb 12.6 oz)     There is no height or weight on file to calculate BMI.    Physical Exam   Constitutional: He appears well-developed and well-nourished.   HENT:   Head: Normocephalic and atraumatic.   Right Ear: Hearing, tympanic membrane, external ear and ear canal normal. No drainage or swelling. No decreased hearing is noted.   Left Ear: Hearing, tympanic membrane, external ear and ear canal normal. No drainage  or swelling. No decreased hearing is noted.   Nose: Nose normal. No rhinorrhea.   Mouth/Throat: Oropharynx is clear and moist. No oropharyngeal exudate, posterior oropharyngeal edema or posterior oropharyngeal erythema.   Eyes: Conjunctivae, EOM and lids are normal. Pupils are equal, round, and reactive to light. Right eye exhibits no discharge and no exudate. Left eye exhibits no discharge and no exudate. Right conjunctiva is not injected. Left conjunctiva is not injected.   Neck: Trachea normal and full passive range of motion without pain. Normal carotid pulses, no hepatojugular reflux and no JVD present. Carotid bruit is not present. No rigidity. No edema and no erythema present. No thyroid mass and no thyromegaly present.   Cardiovascular: Normal rate, regular rhythm and normal heart sounds.    Pulmonary/Chest: Effort normal. No respiratory distress.   Abdominal: Soft. Normal appearance and bowel sounds are normal. There is no tenderness. There is negative Garcia's sign.   Lymphadenopathy:     He has no cervical adenopathy.   Neurological: He is alert.   Skin: Skin is warm and dry.   Psychiatric: He has a normal mood and affect. His speech is normal and behavior is normal.       Assessment and Plan:   Gerald was seen today for abdominal pain.    Diagnoses and all orders for this visit:    Chronic prostatitis    Spastic dysuria  -     POCT urinalysis, dipstick or tablet reag  -     Urine culture    Other orders  -     cefTRIAXone injection 1 g; Inject 1 g into the muscle one time.  -     tramadol (ULTRAM) 50 mg tablet; Take 1 tablet (50 mg total) by mouth every 6 (six) hours as needed for Pain.  -     ciprofloxacin HCl (CIPRO) 500 MG tablet; Take 1 tablet (500 mg total) by mouth 2 (two) times daily.    Time spent in the evaluation and management of this patient exceeded 45 min and greater than 50% of this time was in face-to-face education regarding diagnoses, medications, plan, and follow-up.      Return in  about 1 week (around 3/3/2017).    Pt has been given instructions populated from [employer] patient information database and has verbalized understanding of the after-visit summary (AVS) and information contained therein.    THIS NOTE WILL BE SHARED WITH THE PATIENT.    Called pt at 527 pm and told him Rx for cipro eRx'ed.

## 2025-06-17 ENCOUNTER — PATIENT MESSAGE (OUTPATIENT)
Dept: CARDIOLOGY | Facility: CLINIC | Age: 75
End: 2025-06-17
Payer: MEDICARE

## 2025-06-17 RX ORDER — VALSARTAN 80 MG/1
80 TABLET ORAL DAILY
Qty: 90 TABLET | Refills: 3 | Status: SHIPPED | OUTPATIENT
Start: 2025-06-17 | End: 2026-06-17

## 2025-06-17 NOTE — TELEPHONE ENCOUNTER
No care due was identified.  [facility] Embedded Care Due Messages. Reference number: [id number].   6/17/2025 8:24:53 AM CDT

## 2025-06-17 NOTE — TELEPHONE ENCOUNTER
Refill Routing Note   Medication(s) are not appropriate for processing by [facility] for the following reason(s):        Required vitals abnormal  No active prescription written by provider  Responsible provider unclear    [facility] action(s):  Defer             Appointments  past 12m or future 3m with PCP    Date Provider   Last Visit   3/28/2025 Phil Tellez MD   Next Visit   9/30/2025 Phil Tellez MD   ED visits in past 90 days: 0        Note composed:8:35 AM 06/17/2025

## (undated) DEVICE — SUT 6 18IN STEEL MONO CCS

## (undated) DEVICE — TRANSDUCER ADULT DISP

## (undated) DEVICE — TRAY CATH LAB OMC

## (undated) DEVICE — SUT 2/0 30IN ETHIBOND

## (undated) DEVICE — DRAPE HALF SURGICAL 40X58IN

## (undated) DEVICE — SUT PROLENE 5-0 BL C-1 4-24

## (undated) DEVICE — SUT 4-0 12-18IN SILK BLACK

## (undated) DEVICE — BLADE SAW STERNAL 5/BX

## (undated) DEVICE — CANNULA VESSEL FREE FLOW

## (undated) DEVICE — RETRACTOR OCTOBASE INSERT HOLD

## (undated) DEVICE — BLADE MICROSHARP BLUE 3MM 15DE

## (undated) DEVICE — KIT SAHARA DRAPE DRAW/LIFT

## (undated) DEVICE — DRAPE ANGIO BRACH 38X44IN

## (undated) DEVICE — TIP YANKAUERS BULB NO VENT

## (undated) DEVICE — DRESSING ADH ISLAND 3.6 X 14

## (undated) DEVICE — SUT VICRYL 3-0 27 SH

## (undated) DEVICE — GLOVE BIOGEL PI MICRO SZ 7.5

## (undated) DEVICE — BLADE 4 INCH EDGE UN-INS

## (undated) DEVICE — PLEDGET SUT SOFT 3/8X3/16X1/16

## (undated) DEVICE — GOWN POLY REINF BRTH SLV XL

## (undated) DEVICE — GUIDEWIRE EMERALD .035IN 260CM

## (undated) DEVICE — INSERTS STEALTH FIBRA SIZE 5

## (undated) DEVICE — SUT PROLENE 4-0 SH BLU 36IN

## (undated) DEVICE — PAD K-THERMIA 24IN X 60IN

## (undated) DEVICE — ELECTRODE REM PLYHSV RETURN 9

## (undated) DEVICE — SUT VICRYL CTD 2-0 GI 27 SH

## (undated) DEVICE — KIT GLIDESHEATH SLEND 6FR 10CM

## (undated) DEVICE — SUT VICRYL PLUS 3-0 SH 18IN

## (undated) DEVICE — SUT PROLENE 7-0 BV-1 30

## (undated) DEVICE — BANDAGE ELAS SOFTWRAP ST 4X5YD

## (undated) DEVICE — TRAY HEART OMC

## (undated) DEVICE — SUT MCRYL PLUS 4-0 PS2 27IN

## (undated) DEVICE — KIT CUSTOM MANIFOLD

## (undated) DEVICE — DRESSING TRANS 4X4 TEGADERM

## (undated) DEVICE — APPLICATOR CHLORAPREP ORN 26ML

## (undated) DEVICE — STAPLER ECHELON FLEX 45MM 34CM

## (undated) DEVICE — SUT VICRYL BR 1 GEN 27 CT-1

## (undated) DEVICE — SUT SILK 2-0 SH 18IN BLACK

## (undated) DEVICE — TUBING HF INSUFFLATION W/ FLTR

## (undated) DEVICE — CATH THOR STND RGHT ANG 32FR

## (undated) DEVICE — ADHESIVE DERMABOND ADVANCED

## (undated) DEVICE — DRESSING AQUACEL SACRAL 9 X 9

## (undated) DEVICE — CONNECTOR 1/4X3/16X3/16 Y

## (undated) DEVICE — CANNULA IMA 1MM

## (undated) DEVICE — CLIP SPRING 6MM

## (undated) DEVICE — BLOWER MISTER

## (undated) DEVICE — OMNIPAQUE 350MG 150ML VIAL

## (undated) DEVICE — HEMOSTAT VASC BAND REG 24CM

## (undated) DEVICE — SYR 3CC LUER LOC

## (undated) DEVICE — BANDAGE ELAS SOFTWRAP ST 6X5YD

## (undated) DEVICE — SYS VIRTUOSAPH PLUS EVM

## (undated) DEVICE — COVER PROBE US 5.5X58L NON LTX

## (undated) DEVICE — DRAPE LAP T SHT W/ INSTR PAD

## (undated) DEVICE — SPONGE COTTON TRAY 4X4IN

## (undated) DEVICE — KIT URINARY CATH URINE METER

## (undated) DEVICE — WIRE INTRAMYOCARDIAL TEMP

## (undated) DEVICE — SUT PROLENE 4-0 RB-1 BL MO

## (undated) DEVICE — TRAY MINOR GEN SURG OMC

## (undated) DEVICE — DRAIN PENROSE XRAY 12 X 1/4 ST

## (undated) DEVICE — SPIKE SHORT LG BORE 1-WAY 2IN

## (undated) DEVICE — DRAPE CVMAX SPLIT ANES SCRN

## (undated) DEVICE — BLADE BEAVER 15 DEG 1.5MM

## (undated) DEVICE — PAD CURAD NONADH 3X4IN

## (undated) DEVICE — CATH RADIAL TIG 6FR 4.0 110CM

## (undated) DEVICE — RELOAD GREEN FOR ECHELON

## (undated) DEVICE — DECANTER FLUID TRNSF WHITE 9IN

## (undated) DEVICE — PAD DEFIB CADENCE ADULT R2

## (undated) DEVICE — CLIPS VASCU-STAT YELLOW

## (undated) DEVICE — DRAIN CHEST DRY SUCTION

## (undated) DEVICE — NDL HYPO REG 25G X 1 1/2

## (undated) DEVICE — CATH THORACIC 32FR ST

## (undated) DEVICE — SUT SILK BLK BR. 2 2-60

## (undated) DEVICE — SUT PDS II 2-0 CT-2 VIL

## (undated) DEVICE — DRAPE SLUSH WARMER WITH DISC

## (undated) DEVICE — GOWN SURGICAL X-LARGE